# Patient Record
Sex: MALE | Race: BLACK OR AFRICAN AMERICAN | NOT HISPANIC OR LATINO | Employment: OTHER | URBAN - METROPOLITAN AREA
[De-identification: names, ages, dates, MRNs, and addresses within clinical notes are randomized per-mention and may not be internally consistent; named-entity substitution may affect disease eponyms.]

---

## 2016-11-23 LAB
LEFT EYE DM RETINOPATHY: NEGATIVE
RIGHT EYE DM RETINOPATHY: NEGATIVE

## 2017-10-23 NOTE — PROGRESS NOTES
This note was created by combination of typed  and Dragon dictation.  Transcription errors may be present.  If there are any questions, please contact me.    Assessment & Plan  Chronic midline low back pain without sciatica  Chronic narcotic use  -Reportedly had workup including imaging, sounds like self directed physical therapy, never any surgical interventions indicated.  He's been out of his medications for the past month.   aware queried.  Refilled hydrocodone.  Old records.  She has not had alternative medications or interventions, may need to exhaust those and we talked about this.  Talked about high risk nature of narcotics.  Will need pain contract at future OV  -     hydrocodone-acetaminophen 10-325mg (NORCO)  mg Tab; Take 1 tablet by mouth every 24 hours as needed.  Dispense: 30 tablet; Refill: 0    Essential hypertension - not controlled today, he feels like this is because of his uncontrolled back pain.  For now no change, has room to increase on valsartan and propranolol.    Controlled type 2 diabetes mellitus without complication, without long-term current use of insulin-on metformin, reports that lab work was done less than one month ago, old records request    Coronary artery disease involving native coronary artery of native heart without angina pectoris-on ARB, on beta blocker, not on statin.  Unsure why.  Old records request.    Benign prostatic hyperplasia without lower urinary tract symptoms    Smoker-recommended he stop completely.  He feels like he can do so without assistance.    Medications Discontinued During This Encounter   Medication Reason    hydrocodone-acetaminophen 10-325mg (NORCO)  mg Tab Reorder       Follow-up: No Follow-up on file. office visit 1 month, old records requested in the interim      =================================================================      Chief Complaint   Patient presents with    [facility]    Back Pain       HPI  Paulino  is a 61 y.o. male, last appointment with this clinic was Visit date not found.    NP  He was seeing Dr Fermin Deng who apparently got  and is planning to move his office.  Sounds like he had another doctor who is going to take over his practice but the patient decided to switch offices.    Chronic back pain reportedly after a fall and aggravated with chronic use.  Has been out of medication; affected his sleep. Never surgery; recalls imaging in the past. Does recall having seen a specialist - near [place].  XR apparently showed arthritis. Never injections. Self directed PT. Chronic narcotic therapy.  Dr. Fermin Deng.  Hydrocodone 10 mg daily #30 last RF 8/2. Does water aerobics at the [facility].  Treadmill too.  Notes that his pain is uncontrolled today and he thinks that is why his blood pressure is high.  In the lower back.    Reports colonoscopy done at [facility] repeat 10 years. < 10 years ago by his recall - about 2 years.    Smoker, 1-2 cigarettes a day this time x 4 years.  Feels he can quit without assistance.    DM; metformin - not daily glc checks. On metformin.    Not on statin.    [facility]     Patient Care Team:  Rogelio March MD as PCP - General (Internal Medicine)    Patient Active Problem List    Diagnosis Date Noted    Chronic midline low back pain without sciatica 10/24/2017    Chronic narcotic use 10/24/2017    Essential hypertension 10/24/2017    Diabetes type 2, controlled 10/24/2017    Coronary artery disease involving native coronary artery of native heart without angina pectoris 10/24/2017    Benign prostatic hyperplasia without lower urinary tract symptoms 10/24/2017    Smoker 10/24/2017       PAST MEDICAL HISTORY:  Past Medical History:   Diagnosis Date    Diabetes mellitus, type 2     Hypertension        PAST SURGICAL HISTORY:  History reviewed. No pertinent surgical history.    Family History   Problem Relation Age of Onset     Coronary artery disease Mother     Diabetes Father     Valvular heart disease Sister     Lung disease Sister     Diabetes Brother     No Known Problems Daughter     No Known Problems Son     Diabetes Brother        SOCIAL HISTORY:  Social History     Social History    Marital status: Single     Spouse name: N/A    Number of children: N/A    Years of education: N/A     Occupational History    disabled - orthopedic      Social History Main Topics    Smoking status: Current Every Day Smoker     Packs/day: 0.10     Types: Cigarettes    Smokeless tobacco: Never Used    Alcohol use Not on file    Drug use: No    Sexual activity: Yes     Other Topics Concern    Not on file     Social History Narrative    No narrative on file       ALLERGIES AND MEDICATIONS: updated and reviewed.  Review of patient's allergies indicates:  Allergies not on file  Current Outpatient Prescriptions   Medication Sig Dispense Refill    clopidogrel (PLAVIX) 75 mg tablet Take 75 mg by mouth once daily.      finasteride (PROSCAR) 5 mg tablet Take 5 mg by mouth once daily.      hydrocodone-acetaminophen 10-325mg (NORCO)  mg Tab Take 1 tablet by mouth every 24 hours as needed.       metFORMIN (GLUCOPHAGE) 500 MG tablet Take 500 mg by mouth 2 (two) times daily with meals.      omeprazole (PRILOSEC) 20 MG capsule Take 20 mg by mouth once daily.      propranolol (INDERAL) 20 MG tablet Take 20 mg by mouth 3 (three) times daily.      valsartan (DIOVAN) 160 MG tablet Take 160 mg by mouth once daily.       No current facility-administered medications for this visit.        Review of Systems   Constitutional: Negative for chills and fever.   Respiratory: Negative for shortness of breath.    Cardiovascular: Negative for chest pain and palpitations.   Musculoskeletal: Positive for back pain.       Physical Exam   Vitals:    10/24/17 0959   BP: (!) 160/100   Pulse: 88   Temp: 98.3 °F (36.8 °C)   Weight: 92.5 kg (203 lb 14.8 oz)  "  Height: 6' 2" (1.88 m)    Body mass index is 26.18 kg/m².  Weight: 92.5 kg (203 lb 14.8 oz)   Height: 6' 2" (188 cm)     Physical Exam   Constitutional: He is oriented to person, place, and time. He appears well-developed and well-nourished. No distress.   Eyes: EOM are normal.   Cardiovascular: Normal rate, regular rhythm and normal heart sounds.    No murmur heard.  Pulmonary/Chest: Effort normal and breath sounds normal.   Musculoskeletal: Normal range of motion.   He is mildly diffusely tender on palpation over the thoracic back on inferiorly.  Most tender in the lower lumbar back, straight leg raise on the right elicits pain   Neurological: He is alert and oriented to person, place, and time. Coordination normal.   Skin: Skin is warm and dry.   Psychiatric: He has a normal mood and affect. His behavior is normal. Thought content normal.     "

## 2017-10-24 ENCOUNTER — OFFICE VISIT (OUTPATIENT)
Dept: FAMILY MEDICINE | Facility: CLINIC | Age: 62
End: 2017-10-24
Payer: MEDICARE

## 2017-10-24 VITALS
DIASTOLIC BLOOD PRESSURE: 100 MMHG | SYSTOLIC BLOOD PRESSURE: 160 MMHG | TEMPERATURE: 98 F | HEIGHT: 74 IN | WEIGHT: 203.94 LBS | BODY MASS INDEX: 26.17 KG/M2 | HEART RATE: 88 BPM

## 2017-10-24 DIAGNOSIS — M54.50 CHRONIC MIDLINE LOW BACK PAIN WITHOUT SCIATICA: Primary | ICD-10-CM

## 2017-10-24 DIAGNOSIS — G89.29 CHRONIC MIDLINE LOW BACK PAIN WITHOUT SCIATICA: Primary | ICD-10-CM

## 2017-10-24 DIAGNOSIS — N40.0 BENIGN PROSTATIC HYPERPLASIA WITHOUT LOWER URINARY TRACT SYMPTOMS: ICD-10-CM

## 2017-10-24 DIAGNOSIS — F17.200 SMOKER: ICD-10-CM

## 2017-10-24 DIAGNOSIS — I10 ESSENTIAL HYPERTENSION: ICD-10-CM

## 2017-10-24 DIAGNOSIS — F11.90 CHRONIC NARCOTIC USE: ICD-10-CM

## 2017-10-24 DIAGNOSIS — E11.9 CONTROLLED TYPE 2 DIABETES MELLITUS WITHOUT COMPLICATION, WITHOUT LONG-TERM CURRENT USE OF INSULIN: ICD-10-CM

## 2017-10-24 DIAGNOSIS — I25.10 CORONARY ARTERY DISEASE INVOLVING NATIVE CORONARY ARTERY OF NATIVE HEART WITHOUT ANGINA PECTORIS: ICD-10-CM

## 2017-10-24 PROCEDURE — 99203 OFFICE O/P NEW LOW 30 MIN: CPT | Mod: PBBFAC,PO | Performed by: INTERNAL MEDICINE

## 2017-10-24 PROCEDURE — 99204 OFFICE O/P NEW MOD 45 MIN: CPT | Mod: S$PBB,,, | Performed by: INTERNAL MEDICINE

## 2017-10-24 PROCEDURE — 99999 PR PBB SHADOW E&M-NEW PATIENT-LVL III: CPT | Mod: PBBFAC,,, | Performed by: INTERNAL MEDICINE

## 2017-10-24 RX ORDER — OMEPRAZOLE 20 MG/1
40 CAPSULE, DELAYED RELEASE ORAL DAILY
COMMUNITY
End: 2023-02-02 | Stop reason: SDUPTHER

## 2017-10-24 RX ORDER — HYDROCODONE BITARTRATE AND ACETAMINOPHEN 10; 325 MG/1; MG/1
1 TABLET ORAL
Qty: 30 TABLET | Refills: 0 | Status: SHIPPED | OUTPATIENT
Start: 2017-10-24 | End: 2017-11-20 | Stop reason: SDUPTHER

## 2017-10-24 RX ORDER — CLOPIDOGREL BISULFATE 75 MG/1
75 TABLET ORAL DAILY
Status: ON HOLD | COMMUNITY
End: 2022-11-09 | Stop reason: HOSPADM

## 2017-10-24 RX ORDER — PROPRANOLOL HYDROCHLORIDE 20 MG/1
20 TABLET ORAL 3 TIMES DAILY
COMMUNITY
End: 2017-11-24 | Stop reason: ALTCHOICE

## 2017-10-24 RX ORDER — FINASTERIDE 5 MG/1
5 TABLET, FILM COATED ORAL DAILY
COMMUNITY
End: 2021-01-22 | Stop reason: ALTCHOICE

## 2017-10-24 RX ORDER — METFORMIN HYDROCHLORIDE 500 MG/1
500 TABLET ORAL 2 TIMES DAILY WITH MEALS
COMMUNITY
End: 2019-05-16 | Stop reason: SDUPTHER

## 2017-10-24 RX ORDER — VALSARTAN 160 MG/1
160 TABLET ORAL DAILY
COMMUNITY
End: 2017-11-24

## 2017-10-24 RX ORDER — HYDROCODONE BITARTRATE AND ACETAMINOPHEN 10; 325 MG/1; MG/1
1 TABLET ORAL
COMMUNITY
End: 2017-10-24 | Stop reason: SDUPTHER

## 2017-10-27 DIAGNOSIS — Z12.11 COLON CANCER SCREENING: ICD-10-CM

## 2017-11-20 DIAGNOSIS — G89.29 CHRONIC MIDLINE LOW BACK PAIN WITHOUT SCIATICA: ICD-10-CM

## 2017-11-20 DIAGNOSIS — F11.90 CHRONIC NARCOTIC USE: ICD-10-CM

## 2017-11-20 DIAGNOSIS — M54.50 CHRONIC MIDLINE LOW BACK PAIN WITHOUT SCIATICA: ICD-10-CM

## 2017-11-20 RX ORDER — HYDROCODONE BITARTRATE AND ACETAMINOPHEN 10; 325 MG/1; MG/1
1 TABLET ORAL
Qty: 30 TABLET | Refills: 0 | Status: SHIPPED | OUTPATIENT
Start: 2017-11-20 | End: 2017-12-15 | Stop reason: SDUPTHER

## 2017-11-20 NOTE — TELEPHONE ENCOUNTER
----- Message from Lara Lee sent at 11/20/2017  8:25 AM CST -----  Refill: hydrocodone-acetaminophen 10-325mg (NORCO)  mg Tab    [id number] [facility] [place] [facility] [address]    Patient states he is going out of town. Please call patient at [phone] Thank you!

## 2017-11-22 NOTE — PROGRESS NOTES
This note was created by combination of typed  and Dragon dictation.  Transcription errors may be present.  If there are any questions, please contact me.    Assessment & Plan  Essential hypertension-I'm not sure that these are the exact medications that he takes.  Our staff called over to write a and he has not filled anything other than Norco with them.  Our staff to try and confirm medications and dosages.  He recalls taking a half tablet of a medication, I presume this is a statin but he cannot recall the name of it.  He thinks he is taking valsartan 160 and I'm going to increase it to 320.  Stop the propranolol and start carvedilol at 12.5 to see if we can have additional blood pressure lowering.  Does not recall history of HCTZ.  I await old records but may need to start it if he is never tried it before.  [facility].  I did warn him about seeing so many different doctors as there was a heightened risk of polypharmacy.  -     valsartan (DIOVAN) 320 MG tablet; Take 1 tablet (320 mg total) by mouth once daily.  Dispense: 90 tablet; Refill: 3  -     carvedilol (COREG) 12.5 MG tablet; Take 1 tablet (12.5 mg total) by mouth 2 (two) times daily with meals.  Dispense: 60 tablet; Refill: 11    Chronic narcotic use-Old records from Dr. Lee's office requested.  Aware - he was filling this regularly.    Coronary artery disease involving native coronary artery of native heart without angina pectoris - sounds like he is taking 1/2 tablet statin?  Old records.    Controlled type 2 diabetes mellitus without complication, without long-term current use of insulin - old records requested by never received.  Check labs today CMP and A1c.  On metformin.  -     Comprehensive metabolic panel; Future; Expected date: 11/24/2017  -     Hemoglobin A1c; Future; Expected date: 11/24/2017    Reports he had flu shot done at [facility] a few weeks ago.  After pt had left we  "queried [facility] - had it done August of 2016 - over 1 year ago - needs flu shot with follow up    Medications Discontinued During This Encounter   Medication Reason    valsartan (DIOVAN) 160 MG tablet Therapy not effective    propranolol (INDERAL) 20 MG tablet Therapy completed       Follow-up: No Follow-up on file. OV 1 month follow up BP.  Re-requested old records.  VANESSA signed again.      =================================================================      Chief Complaint   Patient presents with    Follow-up       HPI  Paulino is a 61 y.o. male, last appointment with this clinic was 10/24/2017.    DM; metformin  Chronic back pain reportedly after a fall and aggravated with chronic use.  Never surgery; recalls imaging in the past. Does recall having seen a specialist - near [facility].  XR apparently showed arthritis. Never injections. Self directed PT. Chronic narcotic therapy.    Reports colonoscopy done at [facility] repeat 10 years. < 10 years ago by his recall - about 2 years.  Smoker, 1-2 cigarettes a day this time x 4 years.  Feels he can quit without assistance.    Reports he is taking a "cholesterol medicine" 1/2 tablet but cannot recall the name.      Last visit- new pt - plan was old records - not received. He now shares with me that [facility].  Sounds like he goes through a resident clinic.  He is unable to tell me names of medicines, just knows them by the color.  Discussed with him the importance of knowing names, as generics have variable colors.    No labs available for DM    Reports UTD [facility] shot at [facility].  However, after pt gone, queried [facility] - was done last year.    No new complaints.  Still smoking.     BP high today. No symptoms.     Patient Care Team:  Rogelio March MD as PCP - General (Internal Medicine)    Patient Active Problem List    Diagnosis Date Noted    Chronic midline low back pain without sciatica 10/24/2017    " Chronic narcotic use 10/24/2017    Essential hypertension 10/24/2017    Diabetes type 2, controlled 10/24/2017    Coronary artery disease involving native coronary artery of native heart without angina pectoris 10/24/2017    Benign prostatic hyperplasia without lower urinary tract symptoms 10/24/2017    Smoker 10/24/2017       PAST MEDICAL HISTORY:  Past Medical History:   Diagnosis Date    Diabetes mellitus, type 2     Hypertension        PAST SURGICAL HISTORY:  History reviewed. No pertinent surgical history.    SOCIAL HISTORY:  Social History     Social History    Marital status: Single     Spouse name: N/A    Number of children: N/A    Years of education: N/A     Occupational History    disabled - orthopedic      Social History Main Topics    Smoking status: Current Every Day Smoker     Packs/day: 0.10     Types: Cigarettes    Smokeless tobacco: Never Used    Alcohol use Not on file    Drug use: No    Sexual activity: Yes     Other Topics Concern    Not on file     Social History Narrative    No narrative on file       ALLERGIES AND MEDICATIONS: updated and reviewed.  Review of patient's allergies indicates:  No Known Allergies  Current Outpatient Prescriptions   Medication Sig Dispense Refill    clopidogrel (PLAVIX) 75 mg tablet Take 75 mg by mouth once daily.      finasteride (PROSCAR) 5 mg tablet Take 5 mg by mouth once daily.      hydrocodone-acetaminophen 10-325mg (NORCO)  mg Tab Take 1 tablet by mouth every 24 hours as needed. 30 tablet 0    metFORMIN (GLUCOPHAGE) 500 MG tablet Take 500 mg by mouth 2 (two) times daily with meals.      omeprazole (PRILOSEC) 20 MG capsule Take 20 mg by mouth once daily.      propranolol (INDERAL) 20 MG tablet Take 20 mg by mouth 3 (three) times daily.      valsartan (DIOVAN) 160 MG tablet Take 160 mg by mouth once daily.       No current facility-administered medications for this visit.        Review of Systems   All other systems reviewed and  "are negative.      Physical Exam   Vitals:    11/24/17 1135   BP: (!) 165/101   Pulse: 81   Temp: 97.2 °F (36.2 °C)   SpO2: 95%   Weight: 91.3 kg (201 lb 6.2 oz)   Height: 6' 2" (1.88 m)    Body mass index is 25.86 kg/m².  Weight: 91.3 kg (201 lb 6.2 oz)   Height: 6' 2" (188 cm)     Physical Exam   Constitutional: He is oriented to person, place, and time. He appears well-developed and well-nourished. No distress.   Eyes: EOM are normal.   Cardiovascular: Normal rate, regular rhythm and normal heart sounds.    No murmur heard.  Pulmonary/Chest: Effort normal and breath sounds normal.   Musculoskeletal: Normal range of motion.   Neurological: He is alert and oriented to person, place, and time. Coordination normal.   Skin: Skin is warm and dry.   Psychiatric: He has a normal mood and affect. His behavior is normal. Thought content normal.     "

## 2017-11-24 ENCOUNTER — OFFICE VISIT (OUTPATIENT)
Dept: FAMILY MEDICINE | Facility: CLINIC | Age: 62
End: 2017-11-24
Payer: MEDICARE

## 2017-11-24 ENCOUNTER — TELEPHONE (OUTPATIENT)
Dept: FAMILY MEDICINE | Facility: CLINIC | Age: 62
End: 2017-11-24

## 2017-11-24 ENCOUNTER — LAB VISIT (OUTPATIENT)
Dept: LAB | Facility: HOSPITAL | Age: 62
End: 2017-11-24
Attending: INTERNAL MEDICINE
Payer: MEDICARE

## 2017-11-24 VITALS
WEIGHT: 201.38 LBS | SYSTOLIC BLOOD PRESSURE: 165 MMHG | DIASTOLIC BLOOD PRESSURE: 101 MMHG | TEMPERATURE: 97 F | HEART RATE: 81 BPM | BODY MASS INDEX: 25.84 KG/M2 | OXYGEN SATURATION: 95 % | HEIGHT: 74 IN

## 2017-11-24 DIAGNOSIS — E11.9 CONTROLLED TYPE 2 DIABETES MELLITUS WITHOUT COMPLICATION, WITHOUT LONG-TERM CURRENT USE OF INSULIN: ICD-10-CM

## 2017-11-24 DIAGNOSIS — M1A.09X0 IDIOPATHIC CHRONIC GOUT OF MULTIPLE SITES WITHOUT TOPHUS: ICD-10-CM

## 2017-11-24 DIAGNOSIS — F11.90 CHRONIC NARCOTIC USE: ICD-10-CM

## 2017-11-24 DIAGNOSIS — I25.10 CORONARY ARTERY DISEASE INVOLVING NATIVE CORONARY ARTERY OF NATIVE HEART WITHOUT ANGINA PECTORIS: ICD-10-CM

## 2017-11-24 DIAGNOSIS — I10 ESSENTIAL HYPERTENSION: Primary | ICD-10-CM

## 2017-11-24 PROBLEM — M10.09 IDIOPATHIC GOUT OF MULTIPLE SITES: Status: ACTIVE | Noted: 2017-11-24

## 2017-11-24 LAB
ALBUMIN SERPL BCP-MCNC: 3.6 G/DL
ALP SERPL-CCNC: 136 U/L
ALT SERPL W/O P-5'-P-CCNC: 15 U/L
ANION GAP SERPL CALC-SCNC: 8 MMOL/L
AST SERPL-CCNC: 19 U/L
BILIRUB SERPL-MCNC: 0.2 MG/DL
BUN SERPL-MCNC: 11 MG/DL
CALCIUM SERPL-MCNC: 10 MG/DL
CHLORIDE SERPL-SCNC: 108 MMOL/L
CO2 SERPL-SCNC: 27 MMOL/L
CREAT SERPL-MCNC: 1.1 MG/DL
EST. GFR  (AFRICAN AMERICAN): >60 ML/MIN/1.73 M^2
EST. GFR  (NON AFRICAN AMERICAN): >60 ML/MIN/1.73 M^2
ESTIMATED AVG GLUCOSE: 160 MG/DL
GLUCOSE SERPL-MCNC: 106 MG/DL
HBA1C MFR BLD HPLC: 7.2 %
POTASSIUM SERPL-SCNC: 4.4 MMOL/L
PROT SERPL-MCNC: 7.6 G/DL
SODIUM SERPL-SCNC: 143 MMOL/L

## 2017-11-24 PROCEDURE — 80053 COMPREHEN METABOLIC PANEL: CPT

## 2017-11-24 PROCEDURE — 99213 OFFICE O/P EST LOW 20 MIN: CPT | Mod: PBBFAC,PO | Performed by: INTERNAL MEDICINE

## 2017-11-24 PROCEDURE — 99214 OFFICE O/P EST MOD 30 MIN: CPT | Mod: S$PBB,,, | Performed by: INTERNAL MEDICINE

## 2017-11-24 PROCEDURE — 36415 COLL VENOUS BLD VENIPUNCTURE: CPT | Mod: PO

## 2017-11-24 PROCEDURE — 99999 PR PBB SHADOW E&M-EST. PATIENT-LVL III: CPT | Mod: PBBFAC,,, | Performed by: INTERNAL MEDICINE

## 2017-11-24 PROCEDURE — 83036 HEMOGLOBIN GLYCOSYLATED A1C: CPT

## 2017-11-24 RX ORDER — SIMVASTATIN 40 MG/1
20 TABLET, FILM COATED ORAL NIGHTLY
Qty: 90 TABLET | Refills: 3
Start: 2017-11-24 | End: 2019-01-02 | Stop reason: SDUPTHER

## 2017-11-24 RX ORDER — CARVEDILOL 12.5 MG/1
12.5 TABLET ORAL 2 TIMES DAILY WITH MEALS
Qty: 60 TABLET | Refills: 11 | Status: SHIPPED | OUTPATIENT
Start: 2017-11-24 | End: 2018-07-16 | Stop reason: SDUPTHER

## 2017-11-24 RX ORDER — VALSARTAN 320 MG/1
320 TABLET ORAL DAILY
Qty: 90 TABLET | Refills: 3 | Status: SHIPPED | OUTPATIENT
Start: 2017-11-24 | End: 2018-08-20 | Stop reason: SINTOL

## 2017-11-24 RX ORDER — GLIPIZIDE 5 MG/1
5 TABLET ORAL
Qty: 60 TABLET | Refills: 11
Start: 2017-11-24 | End: 2019-05-15 | Stop reason: SDUPTHER

## 2017-11-24 RX ORDER — ALLOPURINOL 300 MG/1
300 TABLET ORAL DAILY
Qty: 90 TABLET | Refills: 0
Start: 2017-11-24 | End: 2019-01-06

## 2017-11-24 NOTE — PATIENT INSTRUCTIONS
STOP THE PROPRANOLOL.  I AM STARTING YOU ON CARVEDILOL INSTEAD - FOR HEART AND BLOOD PRESSURE.     INCREASE THE VALSARTAN (DIOVAN) - TAKE 320 MG OF THIS.

## 2017-11-24 NOTE — TELEPHONE ENCOUNTER
----- Message from Jess Sofia sent at 11/24/2017  1:13 PM CST -----  Contact: [phone]  Pt said the name of the medication he is taking is valsartan (DIOVAN) 160 MG tablet,simvastain 40mg ,clopidogrel (PLAVIX) 75 mg tablet,allopurlnol 300mg ,propranolol (INDERAL) 20 MG tablet ,glypidze 5 mg ,finasteride (PROSCAR) 5 mg tablet,omeprazole (PRILOSEC) 20 MG capsule,

## 2017-11-29 NOTE — PROGRESS NOTES
a1c not quite ideal at 7.2.  Had been getting care with another outside physician and also periodically goes [facility].   CMP WNL.  Attempting to get old records from Dr. Canas's office.

## 2017-12-01 DIAGNOSIS — E11.9 TYPE 2 DIABETES MELLITUS WITHOUT COMPLICATION: ICD-10-CM

## 2017-12-15 DIAGNOSIS — F11.90 CHRONIC NARCOTIC USE: ICD-10-CM

## 2017-12-15 DIAGNOSIS — G89.29 CHRONIC MIDLINE LOW BACK PAIN WITHOUT SCIATICA: ICD-10-CM

## 2017-12-15 DIAGNOSIS — M54.50 CHRONIC MIDLINE LOW BACK PAIN WITHOUT SCIATICA: ICD-10-CM

## 2017-12-15 RX ORDER — HYDROCODONE BITARTRATE AND ACETAMINOPHEN 10; 325 MG/1; MG/1
1 TABLET ORAL
Qty: 30 TABLET | Refills: 0 | Status: SHIPPED | OUTPATIENT
Start: 2017-12-15 | End: 2018-01-16 | Stop reason: SDUPTHER

## 2017-12-15 NOTE — TELEPHONE ENCOUNTER
----- Message from Harini Brown sent at 12/15/2017  3:07 PM CST -----  Contact: self  [phone]  Pt is requesting a refill on hydrocodone . Pt said it needs to be filled on 12-19-17. Pls call [facility] [phone]. Thanks......Teresa

## 2018-01-16 DIAGNOSIS — G89.29 CHRONIC MIDLINE LOW BACK PAIN WITHOUT SCIATICA: ICD-10-CM

## 2018-01-16 DIAGNOSIS — M54.50 CHRONIC MIDLINE LOW BACK PAIN WITHOUT SCIATICA: ICD-10-CM

## 2018-01-16 DIAGNOSIS — F11.90 CHRONIC NARCOTIC USE: ICD-10-CM

## 2018-01-16 RX ORDER — HYDROCODONE BITARTRATE AND ACETAMINOPHEN 10; 325 MG/1; MG/1
TABLET ORAL
Qty: 30 TABLET | Refills: 0 | Status: SHIPPED | OUTPATIENT
Start: 2018-01-16 | End: 2018-02-17 | Stop reason: SDUPTHER

## 2018-02-17 DIAGNOSIS — F11.90 CHRONIC NARCOTIC USE: ICD-10-CM

## 2018-02-17 DIAGNOSIS — M54.50 CHRONIC MIDLINE LOW BACK PAIN WITHOUT SCIATICA: ICD-10-CM

## 2018-02-17 DIAGNOSIS — G89.29 CHRONIC MIDLINE LOW BACK PAIN WITHOUT SCIATICA: ICD-10-CM

## 2018-02-19 RX ORDER — HYDROCODONE BITARTRATE AND ACETAMINOPHEN 10; 325 MG/1; MG/1
TABLET ORAL
Qty: 30 TABLET | Refills: 0 | Status: SHIPPED | OUTPATIENT
Start: 2018-02-19 | End: 2018-03-16 | Stop reason: SDUPTHER

## 2018-03-16 ENCOUNTER — TELEPHONE (OUTPATIENT)
Dept: FAMILY MEDICINE | Facility: CLINIC | Age: 63
End: 2018-03-16

## 2018-03-16 DIAGNOSIS — M54.50 CHRONIC MIDLINE LOW BACK PAIN WITHOUT SCIATICA: ICD-10-CM

## 2018-03-16 DIAGNOSIS — G89.29 CHRONIC MIDLINE LOW BACK PAIN WITHOUT SCIATICA: ICD-10-CM

## 2018-03-16 DIAGNOSIS — F11.90 CHRONIC NARCOTIC USE: ICD-10-CM

## 2018-03-16 RX ORDER — HYDROCODONE BITARTRATE AND ACETAMINOPHEN 10; 325 MG/1; MG/1
TABLET ORAL
Qty: 30 TABLET | Refills: 0 | Status: SHIPPED | OUTPATIENT
Start: 2018-03-16 | End: 2018-04-23 | Stop reason: SDUPTHER

## 2018-03-16 NOTE — TELEPHONE ENCOUNTER
rx sent.  I have been trying to get old records from Dr. Lee. Have not rec'd these.  Can we call his office and see if they have his records? [phone]

## 2018-04-13 DIAGNOSIS — E11.9 DIABETES MELLITUS WITHOUT COMPLICATION: ICD-10-CM

## 2018-04-17 DIAGNOSIS — G89.29 CHRONIC MIDLINE LOW BACK PAIN WITHOUT SCIATICA: ICD-10-CM

## 2018-04-17 DIAGNOSIS — M54.50 CHRONIC MIDLINE LOW BACK PAIN WITHOUT SCIATICA: ICD-10-CM

## 2018-04-17 DIAGNOSIS — F11.90 CHRONIC NARCOTIC USE: ICD-10-CM

## 2018-04-17 RX ORDER — HYDROCODONE BITARTRATE AND ACETAMINOPHEN 10; 325 MG/1; MG/1
TABLET ORAL
Qty: 30 TABLET | Refills: 0 | OUTPATIENT
Start: 2018-04-17

## 2018-04-18 NOTE — PROGRESS NOTES
This note was created by combination of typed  and Dragon dictation.  Transcription errors may be present.  If there are any questions, please contact me.    Assessment & Plan:   Chronic midline low back pain without sciatica  Chronic narcotic use  -out of rx's x 2-3 days. Refilled today with post dated rx's.  Old records request (again) for old MRIs, any previous treatment. He is wary of injections.  Hx of NSAIDS.  No known hx of dominique or TCA, may consider that in the future as adjuncts and we talked about this.    Signed pain contract today and salient points discussed.  -     hydrocodone-acetaminophen 10-325mg (NORCO)  mg Tab; take 1 tablet by mouth every 24 hours if needed for pain  Dispense: 30 tablet; Refill: 0  -     hydrocodone-acetaminophen 10-325mg (NORCO)  mg Tab; take 1 tablet by mouth every 24 hours if needed for pain  Dispense: 30 tablet; Refill: 0  -     hydrocodone-acetaminophen 10-325mg (NORCO)  mg Tab; take 1 tablet by mouth every 24 hours if needed for pain  Dispense: 30 tablet; Refill: 0    Need for hepatitis C screening test  -     Hepatitis C antibody; Future; Expected date: 04/23/2018    Essential hypertension - not to goal, add on amlodipine, stay on the carvedilol and valsartan  -     Comprehensive metabolic panel; Future; Expected date: 04/23/2018  -     amLODIPine (NORVASC) 2.5 MG tablet; Take 1 tablet (2.5 mg total) by mouth once daily.  Dispense: 30 tablet; Refill: 11    Coronary artery disease involving native coronary artery of native heart without angina pectoris - check FLP, on beeta blocker, ARB, plavix, and simvastatin.  -     Lipid panel; Future; Expected date: 04/23/2018    Controlled type 2 diabetes mellitus without complication, without long-term current use of insulin - check A1c.  Microalbumin.  [facility]. Not on ASA (is on plavix); on metformin and glipizide, on statin. Foot exam done today.  -     Hemoglobin  A1c; Future; Expected date: 04/23/2018  -     Microalbumin/creatinine urine ratio; Future; Expected date: 04/23/2018    Idiopathic chronic gout of multiple sites without tophus - check uric acid.  On allopurinol.  -     Uric acid; Future; Expected date: 04/23/2018    Smoker - has cut down to 1/4 PPD.    Medications Discontinued During This Encounter   Medication Reason    hydrocodone-acetaminophen 10-325mg (NORCO)  mg Tab Reorder     Modified Medications    Modified Medication Previous Medication    HYDROCODONE-ACETAMINOPHEN 10-325MG (NORCO)  MG TAB hydrocodone-acetaminophen 10-325mg (NORCO)  mg Tab       take 1 tablet by mouth every 24 hours if needed for pain    take 1 tablet by mouth every 24 hours if needed for pain     New Prescriptions    AMLODIPINE (NORVASC) 2.5 MG TABLET    Take 1 tablet (2.5 mg total) by mouth once daily.    HYDROCODONE-ACETAMINOPHEN 10-325MG (NORCO)  MG TAB    take 1 tablet by mouth every 24 hours if needed for pain    HYDROCODONE-ACETAMINOPHEN 10-325MG (NORCO)  MG TAB    take 1 tablet by mouth every 24 hours if needed for pain       Follow Up: No Follow-up on file. Plan for follow up 3 months.        Subjective:     Chief Complaint   Patient presents with    Hypertension    Back Pain       HPI  Paulino is a 62 y.o. male, last appointment with this clinic was 11/24/2017.    DM; metformin, glipizide  Chronic back pain reportedly after a fall and aggravated with chronic use.  Never surgery; recalls imaging in the past. Does recall having seen a specialist - near [facility].  XR apparently showed arthritis. Never injections. Self directed PT. Chronic narcotic therapy.    Reports colonoscopy done at [facility] repeat 10 years. < 10 years ago by his recall - about 2 years.  Smoker, 1-2 cigarettes a day this time x 4 years.  Feels he can quit without assistance.  BPH, finasteride  GERD, omeprazole  Gout, podagra and ankle, allopurinol  Plavix, unclear indication.     Last  seen 11/2017  Last visit - increased diovan 160 to 320; changed propranolol to carvedilol; consider HCTZ - no known history of this  Requested old records from Dr. Lee's office  Was on simvastatin 40;    Has been out of the pain meds x a few days.  Estimates pain is 5/10 with meds and 9-10/10 without the meds.  Pain is in the lower back.  In the center of the lumbar spine. Is nonradiating.  Started about 5 years ago by his estimate. Recalls having an MRI done in the past - will need to get old records (previously requested).  Does activity - treadmill, water aerobics.   Does not recall hx of gabapentin.  We talked about adjuncts to narcotic pain medication with a goal of improving pain but also with an eye to either reducing narcotic dose or avoiding escalating.    BP high on intake.  No outside BP readings.  Reports compliance with meds.  Thinks his BP is elevated b/c of pain.    Fasting.    Remote gout glare.  Typically in the feet if he were to get it.    Smoker, cutting back he reports.  < 1/4 PPD.     [facility]  [facility].    Patient Care Team:  Rogelio March MD as PCP - General (Internal Medicine)  Fermin Lee Jr., MD as Consulting Physician (Family Medicine)    Patient Active Problem List    Diagnosis Date Noted    Idiopathic gout of multiple sites 11/24/2017    Chronic midline low back pain without sciatica 10/24/2017    Chronic narcotic use 10/24/2017    Essential hypertension 10/24/2017    Diabetes type 2, controlled 10/24/2017    Coronary artery disease involving native coronary artery of native heart without angina pectoris 10/24/2017    Benign prostatic hyperplasia without lower urinary tract symptoms 10/24/2017    Smoker 10/24/2017       PAST MEDICAL HISTORY:  Past Medical History:   Diagnosis Date    Diabetes mellitus, type 2     Hypertension        PAST SURGICAL HISTORY:  History reviewed. No pertinent surgical history.    SOCIAL HISTORY:  Social  History     Social History    Marital status: Single     Spouse name: N/A    Number of children: N/A    Years of education: N/A     Occupational History    disabled - orthopedic      Social History Main Topics    Smoking status: Current Every Day Smoker     Packs/day: 0.10     Types: Cigarettes    Smokeless tobacco: Never Used    Alcohol use Not on file    Drug use: No    Sexual activity: Yes     Other Topics Concern    Not on file     Social History Narrative    No narrative on file       ALLERGIES AND MEDICATIONS: updated and reviewed.  Review of patient's allergies indicates:  No Known Allergies  Current Outpatient Prescriptions   Medication Sig Dispense Refill    allopurinol (ZYLOPRIM) 300 MG tablet Take 1 tablet (300 mg total) by mouth once daily. 90 tablet 0    carvedilol (COREG) 12.5 MG tablet Take 1 tablet (12.5 mg total) by mouth 2 (two) times daily with meals. 60 tablet 11    clopidogrel (PLAVIX) 75 mg tablet Take 75 mg by mouth once daily.      finasteride (PROSCAR) 5 mg tablet Take 5 mg by mouth once daily.      glipiZIDE (GLUCOTROL) 5 MG tablet Take 1 tablet (5 mg total) by mouth 2 (two) times daily before meals. 60 tablet 11    hydrocodone-acetaminophen 10-325mg (NORCO)  mg Tab take 1 tablet by mouth every 24 hours if needed for pain 30 tablet 0    metFORMIN (GLUCOPHAGE) 500 MG tablet Take 500 mg by mouth 2 (two) times daily with meals.      omeprazole (PRILOSEC) 20 MG capsule Take 20 mg by mouth once daily.      simvastatin (ZOCOR) 40 MG tablet Take 0.5 tablets (20 mg total) by mouth every evening. 90 tablet 3    valsartan (DIOVAN) 320 MG tablet Take 1 tablet (320 mg total) by mouth once daily. 90 tablet 3     No current facility-administered medications for this visit.        Review of Systems   Constitutional: Negative for chills and fever.   Respiratory: Negative for shortness of breath.    Cardiovascular: Negative for chest pain.       Objective:   Physical Exam  "  Vitals:    04/23/18 0917 04/23/18 0940   BP: (!) 156/86 (!) 140/80   BP Location:  Left arm   Patient Position:  Sitting   BP Method:  Medium (Manual)   Pulse: 76    Temp: 98.2 °F (36.8 °C)    TempSrc: Oral    SpO2: 97%    Weight: 88 kg (194 lb 0.1 oz)    Height: 6' 2" (1.88 m)     Body mass index is 24.91 kg/m².  Weight: 88 kg (194 lb 0.1 oz)   Height: 6' 2" (188 cm)     Physical Exam   Constitutional: He is oriented to person, place, and time. He appears well-developed and well-nourished. No distress.   Eyes: EOM are normal.   Cardiovascular: Normal rate, regular rhythm and normal heart sounds.    No murmur heard.  Pulses:       Dorsalis pedis pulses are 3+ on the right side, and 3+ on the left side.        Posterior tibial pulses are 3+ on the right side, and 3+ on the left side.   Pulmonary/Chest: Effort normal and breath sounds normal.   Musculoskeletal: Normal range of motion. He exhibits no edema.        Right foot: There is no deformity.        Left foot: There is no deformity.   Straight leg raise with discomfort left and right   Feet:   Right Foot:   Protective Sensation: 5 sites tested. 5 sites sensed.   Skin Integrity: Negative for ulcer, blister, skin breakdown, erythema or warmth.   Left Foot:   Protective Sensation: 5 sites tested. 5 sites sensed.   Skin Integrity: Negative for ulcer, blister, skin breakdown, erythema or warmth.   Neurological: He is alert and oriented to person, place, and time. Coordination normal.   Skin: Skin is warm and dry.   Psychiatric: He has a normal mood and affect. His behavior is normal. Thought content normal.     "

## 2018-04-20 DIAGNOSIS — Z13.5 DIABETIC RETINOPATHY SCREENING: ICD-10-CM

## 2018-04-23 ENCOUNTER — OFFICE VISIT (OUTPATIENT)
Dept: FAMILY MEDICINE | Facility: CLINIC | Age: 63
End: 2018-04-23
Payer: MEDICARE

## 2018-04-23 ENCOUNTER — LAB VISIT (OUTPATIENT)
Dept: LAB | Facility: HOSPITAL | Age: 63
End: 2018-04-23
Attending: INTERNAL MEDICINE
Payer: MEDICARE

## 2018-04-23 VITALS
BODY MASS INDEX: 24.9 KG/M2 | WEIGHT: 194 LBS | OXYGEN SATURATION: 97 % | DIASTOLIC BLOOD PRESSURE: 80 MMHG | TEMPERATURE: 98 F | HEART RATE: 76 BPM | SYSTOLIC BLOOD PRESSURE: 140 MMHG | HEIGHT: 74 IN

## 2018-04-23 DIAGNOSIS — F11.90 CHRONIC NARCOTIC USE: ICD-10-CM

## 2018-04-23 DIAGNOSIS — M1A.09X0 IDIOPATHIC CHRONIC GOUT OF MULTIPLE SITES WITHOUT TOPHUS: ICD-10-CM

## 2018-04-23 DIAGNOSIS — G89.29 CHRONIC MIDLINE LOW BACK PAIN WITHOUT SCIATICA: Primary | ICD-10-CM

## 2018-04-23 DIAGNOSIS — Z11.59 NEED FOR HEPATITIS C SCREENING TEST: ICD-10-CM

## 2018-04-23 DIAGNOSIS — E11.9 CONTROLLED TYPE 2 DIABETES MELLITUS WITHOUT COMPLICATION, WITHOUT LONG-TERM CURRENT USE OF INSULIN: ICD-10-CM

## 2018-04-23 DIAGNOSIS — I10 ESSENTIAL HYPERTENSION: ICD-10-CM

## 2018-04-23 DIAGNOSIS — I25.10 CORONARY ARTERY DISEASE INVOLVING NATIVE CORONARY ARTERY OF NATIVE HEART WITHOUT ANGINA PECTORIS: ICD-10-CM

## 2018-04-23 DIAGNOSIS — F17.200 SMOKER: ICD-10-CM

## 2018-04-23 DIAGNOSIS — M54.50 CHRONIC MIDLINE LOW BACK PAIN WITHOUT SCIATICA: Primary | ICD-10-CM

## 2018-04-23 PROBLEM — E11.29 CONTROLLED TYPE 2 DIABETES MELLITUS WITH MICROALBUMINURIA, WITHOUT LONG-TERM CURRENT USE OF INSULIN: Status: ACTIVE | Noted: 2017-10-24

## 2018-04-23 PROBLEM — R80.9 CONTROLLED TYPE 2 DIABETES MELLITUS WITH MICROALBUMINURIA, WITHOUT LONG-TERM CURRENT USE OF INSULIN: Status: ACTIVE | Noted: 2017-10-24

## 2018-04-23 LAB
ALBUMIN SERPL BCP-MCNC: 4 G/DL
ALP SERPL-CCNC: 109 U/L
ALT SERPL W/O P-5'-P-CCNC: 13 U/L
ANION GAP SERPL CALC-SCNC: 5 MMOL/L
AST SERPL-CCNC: 17 U/L
BILIRUB SERPL-MCNC: 0.4 MG/DL
BUN SERPL-MCNC: 12 MG/DL
CALCIUM SERPL-MCNC: 10.2 MG/DL
CHLORIDE SERPL-SCNC: 105 MMOL/L
CHOLEST SERPL-MCNC: 125 MG/DL
CHOLEST/HDLC SERPL: 3.5 {RATIO}
CO2 SERPL-SCNC: 30 MMOL/L
CREAT SERPL-MCNC: 1.2 MG/DL
EST. GFR  (AFRICAN AMERICAN): >60 ML/MIN/1.73 M^2
EST. GFR  (NON AFRICAN AMERICAN): >60 ML/MIN/1.73 M^2
ESTIMATED AVG GLUCOSE: 140 MG/DL
GLUCOSE SERPL-MCNC: 144 MG/DL
HBA1C MFR BLD HPLC: 6.5 %
HDLC SERPL-MCNC: 36 MG/DL
HDLC SERPL: 28.8 %
LDLC SERPL CALC-MCNC: 71.4 MG/DL
NONHDLC SERPL-MCNC: 89 MG/DL
POTASSIUM SERPL-SCNC: 4.5 MMOL/L
PROT SERPL-MCNC: 7.5 G/DL
SODIUM SERPL-SCNC: 140 MMOL/L
TRIGL SERPL-MCNC: 88 MG/DL
URATE SERPL-MCNC: 3.9 MG/DL

## 2018-04-23 PROCEDURE — 80053 COMPREHEN METABOLIC PANEL: CPT

## 2018-04-23 PROCEDURE — 84550 ASSAY OF BLOOD/URIC ACID: CPT

## 2018-04-23 PROCEDURE — 99214 OFFICE O/P EST MOD 30 MIN: CPT | Mod: S$PBB,,, | Performed by: INTERNAL MEDICINE

## 2018-04-23 PROCEDURE — 80061 LIPID PANEL: CPT

## 2018-04-23 PROCEDURE — 86803 HEPATITIS C AB TEST: CPT

## 2018-04-23 PROCEDURE — 36415 COLL VENOUS BLD VENIPUNCTURE: CPT | Mod: PO

## 2018-04-23 PROCEDURE — 99213 OFFICE O/P EST LOW 20 MIN: CPT | Mod: PBBFAC,PO | Performed by: INTERNAL MEDICINE

## 2018-04-23 PROCEDURE — 83036 HEMOGLOBIN GLYCOSYLATED A1C: CPT

## 2018-04-23 PROCEDURE — 99999 PR PBB SHADOW E&M-EST. PATIENT-LVL III: CPT | Mod: PBBFAC,,, | Performed by: INTERNAL MEDICINE

## 2018-04-23 RX ORDER — AMLODIPINE BESYLATE 2.5 MG/1
2.5 TABLET ORAL DAILY
Qty: 30 TABLET | Refills: 11 | Status: SHIPPED | OUTPATIENT
Start: 2018-04-23 | End: 2018-06-18 | Stop reason: SDUPTHER

## 2018-04-23 RX ORDER — HYDROCODONE BITARTRATE AND ACETAMINOPHEN 10; 325 MG/1; MG/1
TABLET ORAL
Qty: 30 TABLET | Refills: 0 | Status: SHIPPED | OUTPATIENT
Start: 2018-05-23 | End: 2018-06-22

## 2018-04-23 RX ORDER — HYDROCODONE BITARTRATE AND ACETAMINOPHEN 10; 325 MG/1; MG/1
TABLET ORAL
Qty: 30 TABLET | Refills: 0 | Status: SHIPPED | OUTPATIENT
Start: 2018-06-22 | End: 2018-06-18 | Stop reason: SDUPTHER

## 2018-04-23 RX ORDER — HYDROCODONE BITARTRATE AND ACETAMINOPHEN 10; 325 MG/1; MG/1
TABLET ORAL
Qty: 30 TABLET | Refills: 0 | Status: SHIPPED | OUTPATIENT
Start: 2018-04-23 | End: 2018-08-20 | Stop reason: SDUPTHER

## 2018-04-24 ENCOUNTER — TELEPHONE (OUTPATIENT)
Dept: FAMILY MEDICINE | Facility: CLINIC | Age: 63
End: 2018-04-24

## 2018-04-24 DIAGNOSIS — R76.8 POSITIVE HEPATITIS C ANTIBODY TEST: Primary | ICD-10-CM

## 2018-04-24 LAB — HCV AB SERPL QL IA: POSITIVE

## 2018-04-24 NOTE — TELEPHONE ENCOUNTER
Left message asking for pt to call back.  His urine showed small amount of protein. I ordered amlodipine for his BP yesterday and if he has not picked that up yet I would like to change that to lisinopril.     If he has picked up the amlodipine he can take it for 30 days and then not refill that.  But start lisinopril in a month.    Await pt call back.

## 2018-04-29 NOTE — PROGRESS NOTES
ADDENDUM  Old records rec'd from Dr. Lee's Office.    Current medications  Diovan//12.5  Allopurinol 100  Simvastatin 40 mg  Zantac 150 mg  BID  Plavix 75 mg  Metoprolol 50 mg twice daily  Flomax 0.4 mg  Colchicine 0.6 mg  Phenergan  Metformin 1000 mg twice daily  glimepiride 4 mg twice daily  Norco 10/325 #30  History of Fenofibrate 145 mg    Allergy ACEI    2/2/2017 a1c 7.9    Diabetes type II With microalbuminuria  COPD with a reported history of abnormal PFTs. AlbuterolNebulizer  Brooke, podiatry Doctor Jeromy  BPH, Flomax started 3/2017  RefluxHypercholesterolemiaUmbilical hernia repair 2002 9/8/2011 MRI lumbar spine schmorls nodes and ligamentous hypertrophy, root impingmenet noted  referred to [place] [facility] 9/17  Osteoarthritis of the knees with a history of injection    No labs included  No MRI report included  No colonoscopy/FOBT results included    [facility]

## 2018-04-29 NOTE — TELEPHONE ENCOUNTER
Already on valsartan so DC the rx sent in for lisinopril.    Old records rec'd from Dr. Lee, they state he has ACEI allergy so another reason to stop the lisinopril.    Hx of diovan HCT, pt currently on just diovan.     Would stay on amlodipine, stay on valsartan, and not start the lisinopril.  Hx of metoprolol, currently on carvedilol.    I never sent in lisinopril rx.    Please call pt - sorry for the confusion - I just got records from Dr. Lee's office - do not take lisinopril. Stay on carvedilol, stay on valsartan, I added on amlodipine, go ahead and take amlodipine.    Also, his lab test came back abnormal for the hepatitis C test and I need to see if this test is real or not.  Have him set up lab visit for hep c testing - hep C PCR.  Ordered.

## 2018-05-01 NOTE — TELEPHONE ENCOUNTER
Also - his screening test for hepatitis C came back abnormal - I need to run further tests to see if he really does have this or not - will order hepatitis C test.

## 2018-06-18 DIAGNOSIS — M54.50 CHRONIC MIDLINE LOW BACK PAIN WITHOUT SCIATICA: ICD-10-CM

## 2018-06-18 DIAGNOSIS — G89.29 CHRONIC MIDLINE LOW BACK PAIN WITHOUT SCIATICA: ICD-10-CM

## 2018-06-18 DIAGNOSIS — F11.90 CHRONIC NARCOTIC USE: ICD-10-CM

## 2018-06-18 DIAGNOSIS — I10 ESSENTIAL HYPERTENSION: ICD-10-CM

## 2018-06-18 RX ORDER — AMLODIPINE BESYLATE 2.5 MG/1
2.5 TABLET ORAL DAILY
Qty: 30 TABLET | Refills: 11 | Status: SHIPPED | OUTPATIENT
Start: 2018-06-18 | End: 2019-05-15 | Stop reason: SDUPTHER

## 2018-06-18 RX ORDER — HYDROCODONE BITARTRATE AND ACETAMINOPHEN 10; 325 MG/1; MG/1
TABLET ORAL
Qty: 30 TABLET | Refills: 0 | Status: SHIPPED | OUTPATIENT
Start: 2018-06-22 | End: 2018-07-16 | Stop reason: SDUPTHER

## 2018-06-18 NOTE — TELEPHONE ENCOUNTER
----- Message from Sahara Purdy sent at 6/18/2018  3:20 PM CDT -----  Contact: Self/[phone]  Refill:  hydrocodone-acetaminophen 10-325mg (NORCO)  mg Tab  Refill:  amLODIPine (NORVASC) 2.5 MG tablet      Pharmacy: [facility] on [address]   Tele:   [phone]    Thank you.

## 2018-07-06 ENCOUNTER — TELEPHONE (OUTPATIENT)
Dept: FAMILY MEDICINE | Facility: CLINIC | Age: 63
End: 2018-07-06

## 2018-07-06 NOTE — TELEPHONE ENCOUNTER
----- Message from Roselyn Villatoro sent at 7/6/2018  1:41 PM CDT -----  Contact: Self/ [phone]  Pt calling to notify office he has a new preferred pharmacy and would like to request all his medication refills be sent there for  Jul 18, 2018. Please call with status. Thank you.    [facility] [address] [place] [address] #[phone]

## 2018-07-16 DIAGNOSIS — F11.90 CHRONIC NARCOTIC USE: ICD-10-CM

## 2018-07-16 DIAGNOSIS — G89.29 CHRONIC MIDLINE LOW BACK PAIN WITHOUT SCIATICA: ICD-10-CM

## 2018-07-16 DIAGNOSIS — I10 ESSENTIAL HYPERTENSION: ICD-10-CM

## 2018-07-16 DIAGNOSIS — M54.50 CHRONIC MIDLINE LOW BACK PAIN WITHOUT SCIATICA: ICD-10-CM

## 2018-07-16 RX ORDER — CARVEDILOL 12.5 MG/1
12.5 TABLET ORAL 2 TIMES DAILY WITH MEALS
Qty: 60 TABLET | Refills: 11 | Status: SHIPPED | OUTPATIENT
Start: 2018-07-16 | End: 2018-07-17 | Stop reason: SDUPTHER

## 2018-07-16 RX ORDER — HYDROCODONE BITARTRATE AND ACETAMINOPHEN 10; 325 MG/1; MG/1
TABLET ORAL
Qty: 30 TABLET | Refills: 0 | Status: SHIPPED | OUTPATIENT
Start: 2018-07-16 | End: 2018-07-17 | Stop reason: SDUPTHER

## 2018-07-16 NOTE — TELEPHONE ENCOUNTER
----- Message from Susan Lebron sent at 7/16/2018  2:36 PM CDT -----  Contact: self  carvedilol (COREG) 12.5 MG tablet  HYDROcodone-acetaminophen (NORCO)  mg per tablet    Pt calling to request a refill of the above to be sent to [facility]. Please call [phone].

## 2018-07-17 RX ORDER — CARVEDILOL 12.5 MG/1
12.5 TABLET ORAL 2 TIMES DAILY WITH MEALS
Qty: 60 TABLET | Refills: 11 | Status: SHIPPED | OUTPATIENT
Start: 2018-07-17 | End: 2018-09-20 | Stop reason: SDUPTHER

## 2018-07-17 RX ORDER — HYDROCODONE BITARTRATE AND ACETAMINOPHEN 10; 325 MG/1; MG/1
TABLET ORAL
Qty: 30 TABLET | Refills: 0 | Status: SHIPPED | OUTPATIENT
Start: 2018-07-17 | End: 2018-08-16

## 2018-08-16 ENCOUNTER — TELEPHONE (OUTPATIENT)
Dept: FAMILY MEDICINE | Facility: CLINIC | Age: 63
End: 2018-08-16

## 2018-08-16 NOTE — TELEPHONE ENCOUNTER
----- Message from Sahara Purdy sent at 8/16/2018  3:11 PM CDT -----  Contact: Self/[phone]  Refill:  HYDROcodone-acetaminophen (NORCO)  mg per tablet    [facility] - [place], [place] - [address] [place] [address]  Phone: [phone] Fax: [phone]    Thank you.

## 2018-08-20 ENCOUNTER — TELEPHONE (OUTPATIENT)
Dept: FAMILY MEDICINE | Facility: CLINIC | Age: 63
End: 2018-08-20

## 2018-08-20 DIAGNOSIS — G89.29 CHRONIC MIDLINE LOW BACK PAIN WITHOUT SCIATICA: ICD-10-CM

## 2018-08-20 DIAGNOSIS — M54.50 CHRONIC MIDLINE LOW BACK PAIN WITHOUT SCIATICA: ICD-10-CM

## 2018-08-20 DIAGNOSIS — I10 ESSENTIAL HYPERTENSION: Primary | ICD-10-CM

## 2018-08-20 DIAGNOSIS — F11.90 CHRONIC NARCOTIC USE: ICD-10-CM

## 2018-08-20 RX ORDER — IRBESARTAN 300 MG/1
300 TABLET ORAL NIGHTLY
Qty: 90 TABLET | Refills: 3 | Status: SHIPPED | OUTPATIENT
Start: 2018-08-20 | End: 2019-05-15 | Stop reason: SDUPTHER

## 2018-08-20 RX ORDER — HYDROCODONE BITARTRATE AND ACETAMINOPHEN 10; 325 MG/1; MG/1
TABLET ORAL
Qty: 10 TABLET | Refills: 0 | Status: SHIPPED | OUTPATIENT
Start: 2018-08-20 | End: 2018-08-29 | Stop reason: SDUPTHER

## 2018-08-20 NOTE — TELEPHONE ENCOUNTER
----- Message from Eleni Arteaga sent at 8/17/2018  3:53 PM CDT -----  Contact: Self  Pt is calling to speak with staff regarding refills on his medication  HYDROcodone-acetaminophen (NORCO)  mg per tablet. He doesn't understand. Please call pt at [phone].

## 2018-08-20 NOTE — TELEPHONE ENCOUNTER
----- Message from Saroj Ramirez LPN sent at 8/20/2018  3:22 PM CDT -----  Contact: Self       ----- Message -----  From: Alisha Reid  Sent: 8/20/2018   3:03 PM  To: Anca Mercado Staff    Patient has been notified by the pharmacy that his medication has been recalled.  [phone].    valsartan (DIOVAN) 320 MG tablet    Pt is requesting an alternative    [facility] - [place], [place] - [address]

## 2018-08-28 NOTE — PROGRESS NOTES
This note was created by combination of typed  and Dragon dictation.  Transcription errors may be present.  If there are any questions, please contact me.    Assessment & Plan:   Chronic midline low back pain without sciatica  Chronic narcotic use  -stable on current dose, once daily use.  Refilled and post dated rx's sent.  -     HYDROcodone-acetaminophen (NORCO)  mg per tablet; take 1 tablet by mouth every 24 hours if needed for pain  Dispense: 30 tablet; Refill: 0  -     HYDROcodone-acetaminophen (NORCO)  mg per tablet; take 1 tablet by mouth every 24 hours if needed for pain  Dispense: 30 tablet; Refill: 0  -     HYDROcodone-acetaminophen (NORCO)  mg per tablet; take 1 tablet by mouth every 24 hours if needed for pain  Dispense: 30 tablet; Refill: 0    History of hepatitis C  -reports he was treated and cured.  [facility] what sounds like [facility].  [facility] to [facility].  -     Cancel: Hepatitis C RNA, quantitative, PCR; Future; Expected date: 08/29/2018    Will try to get outside records [facility] and [facility].    Medications Discontinued During This Encounter   Medication Reason    HYDROcodone-acetaminophen (NORCO)  mg per tablet Reorder     Modified Medications    Modified Medication Previous Medication    HYDROCODONE-ACETAMINOPHEN (NORCO)  MG PER TABLET HYDROcodone-acetaminophen (NORCO)  mg per tablet       take 1 tablet by mouth every 24 hours if needed for pain    take 1 tablet by mouth every 24 hours if needed for pain     New Prescriptions    HYDROCODONE-ACETAMINOPHEN (NORCO)  MG PER TABLET    take 1 tablet by mouth every 24 hours if needed for pain    HYDROCODONE-ACETAMINOPHEN (NORCO)  MG PER TABLET    take 1 tablet by mouth every 24 hours if needed for pain       Follow Up: No Follow-up on file. OV 3-4 months.        Subjective:     Chief Complaint   Patient presents with     Medication Refill       HPI  Paulino is a 62 y.o. male, last appointment with this clinic was 2018.    DM2 with microalbuminuria; metformin, glipizide  Chronic back pain reportedly after a fall and aggravated with chronic use.  Never surgery; recalls imaging in the past. Does recall having seen a specialist - near [place].  XR apparently showed arthritis. Never injections. Self directed PT. Chronic narcotic therapy.   2011 MRI lumbar spine schmorls nodes and ligamentous hypertrophy, root impingment noted   Reports colonoscopy done at [facility] [place] [facility] repeat 10 years. < 10 years ago by his recall - about 2 years.  Smoker, 1-2 cigarettes a day this time x 4 years.  Feels he can quit without assistance.  BPH,  Flomax started 3/2017  GERD, omeprazole  Gout, podagra and ankle, allopurinol  Plavix, unclear indication.  COPD with a reported history of abnormal PFTs. AlbuterolNebulizer  Brooke, podiatry Doctor Jeromy  Umbilical hernia repair   Osteoarthritis of the knees with a history of injection  Hx of HCV reportedly treated by a doctor in [place] with injections.  Reportedly cured.  Reports he [facility], [facility]    Cousin .  So did an uncle.  Taking it day by day.     Reports he was treated by a doctor in [place] with injection for HCV.  Reports he was cured.  Had liver biopsy as well. Does not know the results of the liver biopsy. But it sounds like the [facility] doctor recently ordered an [facility] liver?  Will [facility] to [facility].    Thinks he had the colonoscopy at [facility].  [facility] by the [facility] it sounds like.  EGD and colonoscopy.     [facility].     Changed to irbesartan from valsartan.  BP normal today.  Denies obvious SE of medication.     PEG-3 Assessment 2018   What number best described your pain on average in the past week? 5   What number best describes how, during the past week, pain has interfered with your enjoyment of life? 2   What number best describes how,  during the past week, pain has interfered with your general activity? 3   Score 10         Patient Care Team:  Rogelio March MD as PCP - General (Internal Medicine)  Fermin Lee Jr., MD as Consulting Physician (Family Medicine)    Patient Active Problem List    Diagnosis Date Noted    History of hepatitis C s/p treatment per pt.  [facility], reported [facility] liver 8/2018 08/29/2018    Idiopathic gout of multiple sites 11/24/2017    Chronic midline low back pain without sciatica; by report, 9/8/2011 MRI lumbar spine schmorl's nodes and ligamentous hypertrophy, root impingment noted 10/24/2017    Chronic narcotic use 10/24/2017    Essential hypertension 10/24/2017    Controlled type 2 diabetes mellitus with microalbuminuria, without long-term current use of insulin 10/24/2017    Coronary artery disease involving native coronary artery of native heart without angina pectoris 10/24/2017    Benign prostatic hyperplasia without lower urinary tract symptoms 10/24/2017    Smoker 10/24/2017       PAST MEDICAL HISTORY:  Past Medical History:   Diagnosis Date    Diabetes mellitus, type 2     Hypertension        PAST SURGICAL HISTORY:  History reviewed. No pertinent surgical history.    SOCIAL HISTORY:  Social History     Socioeconomic History    Marital status: Single     Spouse name: Not on file    Number of children: Not on file    Years of education: Not on file    Highest education level: Not on file   Social Needs    Financial resource strain: Not on file    Food insecurity - worry: Not on file    Food insecurity - inability: Not on file    Transportation needs - medical: Not on file    Transportation needs - non-medical: Not on file   Occupational History    Occupation: disabled - orthopedic   Tobacco Use    Smoking status: Current Every Day Smoker     Packs/day: 0.10     Types: Cigarettes    Smokeless tobacco: Never Used   Substance and Sexual Activity    Alcohol use: Not on  file    Drug use: No    Sexual activity: Yes   Other Topics Concern    Not on file   Social History Narrative    Not on file       ALLERGIES AND MEDICATIONS: updated and reviewed.  Review of patient's allergies indicates:   Allergen Reactions    Ace inhibitors      Current Outpatient Medications   Medication Sig Dispense Refill    allopurinol (ZYLOPRIM) 300 MG tablet Take 1 tablet (300 mg total) by mouth once daily. 90 tablet 0    amLODIPine (NORVASC) 2.5 MG tablet Take 1 tablet (2.5 mg total) by mouth once daily. 30 tablet 11    carvedilol (COREG) 12.5 MG tablet Take 1 tablet (12.5 mg total) by mouth 2 (two) times daily with meals. 60 tablet 11    clopidogrel (PLAVIX) 75 mg tablet Take 75 mg by mouth once daily.      finasteride (PROSCAR) 5 mg tablet Take 5 mg by mouth once daily.      glipiZIDE (GLUCOTROL) 5 MG tablet Take 1 tablet (5 mg total) by mouth 2 (two) times daily before meals. 60 tablet 11    HYDROcodone-acetaminophen (NORCO)  mg per tablet take 1 tablet by mouth every 24 hours if needed for pain 10 tablet 0    irbesartan (AVAPRO) 300 MG tablet Take 1 tablet (300 mg total) by mouth every evening. 90 tablet 3    metFORMIN (GLUCOPHAGE) 500 MG tablet Take 500 mg by mouth 2 (two) times daily with meals.      omeprazole (PRILOSEC) 20 MG capsule Take 20 mg by mouth once daily.      simvastatin (ZOCOR) 40 MG tablet Take 0.5 tablets (20 mg total) by mouth every evening. 90 tablet 3     No current facility-administered medications for this visit.        Review of Systems   Constitutional: Negative for chills and fever.   Respiratory: Negative for shortness of breath.    Cardiovascular: Negative for chest pain and palpitations.       Objective:   Physical Exam   Vitals:    08/29/18 1028 08/29/18 1054   BP: 130/88 130/84   BP Location:  Left arm   Patient Position:  Sitting   BP Method:  Medium (Manual)   Pulse: 72    Temp: 98 °F (36.7 °C)    SpO2: 97%    Weight: 87.9 kg (193 lb 12.6 oz)   "  Height: 6' 2" (1.88 m)     Body mass index is 24.88 kg/m².  Weight: 87.9 kg (193 lb 12.6 oz)   Height: 6' 2" (188 cm)     Physical Exam   Constitutional: He is oriented to person, place, and time. He appears well-developed and well-nourished. No distress.   Eyes: EOM are normal.   Cardiovascular: Normal rate, regular rhythm and normal heart sounds.   No murmur heard.  Pulmonary/Chest: Effort normal and breath sounds normal.   Musculoskeletal: He exhibits no edema.   Seated straight leg raise with pain in the lower spine with both left and right leg raise   Neurological: He is alert and oriented to person, place, and time. Coordination normal.   Skin: Skin is warm and dry.   Psychiatric: He has a normal mood and affect. His behavior is normal. Thought content normal.     "

## 2018-08-29 ENCOUNTER — OFFICE VISIT (OUTPATIENT)
Dept: FAMILY MEDICINE | Facility: CLINIC | Age: 63
End: 2018-08-29
Payer: MEDICARE

## 2018-08-29 VITALS
BODY MASS INDEX: 24.87 KG/M2 | HEIGHT: 74 IN | HEART RATE: 72 BPM | TEMPERATURE: 98 F | OXYGEN SATURATION: 97 % | SYSTOLIC BLOOD PRESSURE: 130 MMHG | WEIGHT: 193.81 LBS | DIASTOLIC BLOOD PRESSURE: 84 MMHG

## 2018-08-29 DIAGNOSIS — Z86.19 HISTORY OF HEPATITIS C: ICD-10-CM

## 2018-08-29 DIAGNOSIS — F11.90 CHRONIC NARCOTIC USE: ICD-10-CM

## 2018-08-29 DIAGNOSIS — G89.29 CHRONIC MIDLINE LOW BACK PAIN WITHOUT SCIATICA: Primary | ICD-10-CM

## 2018-08-29 DIAGNOSIS — M54.50 CHRONIC MIDLINE LOW BACK PAIN WITHOUT SCIATICA: Primary | ICD-10-CM

## 2018-08-29 PROCEDURE — 99214 OFFICE O/P EST MOD 30 MIN: CPT | Mod: S$PBB,,, | Performed by: INTERNAL MEDICINE

## 2018-08-29 PROCEDURE — 99999 PR PBB SHADOW E&M-EST. PATIENT-LVL III: CPT | Mod: PBBFAC,,, | Performed by: INTERNAL MEDICINE

## 2018-08-29 PROCEDURE — 99213 OFFICE O/P EST LOW 20 MIN: CPT | Mod: PBBFAC,PO | Performed by: INTERNAL MEDICINE

## 2018-08-29 RX ORDER — HYDROCODONE BITARTRATE AND ACETAMINOPHEN 10; 325 MG/1; MG/1
TABLET ORAL
Qty: 30 TABLET | Refills: 0 | Status: SHIPPED | OUTPATIENT
Start: 2018-09-20 | End: 2018-09-27 | Stop reason: SDUPTHER

## 2018-08-29 RX ORDER — HYDROCODONE BITARTRATE AND ACETAMINOPHEN 10; 325 MG/1; MG/1
TABLET ORAL
Qty: 30 TABLET | Refills: 0 | Status: SHIPPED | OUTPATIENT
Start: 2018-10-20 | End: 2018-08-29 | Stop reason: SDUPTHER

## 2018-08-29 RX ORDER — HYDROCODONE BITARTRATE AND ACETAMINOPHEN 10; 325 MG/1; MG/1
TABLET ORAL
Qty: 30 TABLET | Refills: 0 | Status: SHIPPED | OUTPATIENT
Start: 2018-11-20 | End: 2018-11-26 | Stop reason: SDUPTHER

## 2018-08-29 RX ORDER — HYDROCODONE BITARTRATE AND ACETAMINOPHEN 10; 325 MG/1; MG/1
TABLET ORAL
Qty: 30 TABLET | Refills: 0 | Status: SHIPPED | OUTPATIENT
Start: 2018-08-29 | End: 2018-09-28

## 2018-09-20 DIAGNOSIS — I10 ESSENTIAL HYPERTENSION: ICD-10-CM

## 2018-09-20 RX ORDER — CARVEDILOL 12.5 MG/1
12.5 TABLET ORAL 2 TIMES DAILY WITH MEALS
Qty: 180 TABLET | Refills: 3 | Status: SHIPPED | OUTPATIENT
Start: 2018-09-20 | End: 2019-03-18 | Stop reason: SDUPTHER

## 2018-09-27 ENCOUNTER — TELEPHONE (OUTPATIENT)
Dept: FAMILY MEDICINE | Facility: CLINIC | Age: 63
End: 2018-09-27

## 2018-09-27 DIAGNOSIS — M54.50 CHRONIC MIDLINE LOW BACK PAIN WITHOUT SCIATICA: ICD-10-CM

## 2018-09-27 DIAGNOSIS — F11.90 CHRONIC NARCOTIC USE: ICD-10-CM

## 2018-09-27 DIAGNOSIS — G89.29 CHRONIC MIDLINE LOW BACK PAIN WITHOUT SCIATICA: ICD-10-CM

## 2018-09-27 RX ORDER — HYDROCODONE BITARTRATE AND ACETAMINOPHEN 10; 325 MG/1; MG/1
TABLET ORAL
Qty: 30 TABLET | Refills: 0 | Status: SHIPPED | OUTPATIENT
Start: 2018-09-27 | End: 2018-10-26 | Stop reason: SDUPTHER

## 2018-09-27 NOTE — TELEPHONE ENCOUNTER
----- Message from Miranda Lee sent at 9/27/2018 10:40 AM CDT -----  Contact: Self  Patient request medication refill. Please call [phone]      HYDROcodone-acetaminophen (NORCO)  mg per tablet        [facility] - [place], [place] - [address]

## 2018-10-26 ENCOUNTER — TELEPHONE (OUTPATIENT)
Dept: FAMILY MEDICINE | Facility: CLINIC | Age: 63
End: 2018-10-26

## 2018-10-26 DIAGNOSIS — F11.90 CHRONIC NARCOTIC USE: ICD-10-CM

## 2018-10-26 DIAGNOSIS — G89.29 CHRONIC MIDLINE LOW BACK PAIN WITHOUT SCIATICA: ICD-10-CM

## 2018-10-26 DIAGNOSIS — M54.50 CHRONIC MIDLINE LOW BACK PAIN WITHOUT SCIATICA: ICD-10-CM

## 2018-10-26 RX ORDER — HYDROCODONE BITARTRATE AND ACETAMINOPHEN 10; 325 MG/1; MG/1
TABLET ORAL
Qty: 30 TABLET | Refills: 0 | Status: SHIPPED | OUTPATIENT
Start: 2018-10-26 | End: 2018-11-26

## 2018-10-26 NOTE — TELEPHONE ENCOUNTER
----- Message from Eleni Arteaga sent at 10/26/2018  9:44 AM CDT -----  Contact: Self  Last refill was 09/27/2018. Needs medication today. Please call pt at [phone]

## 2018-10-26 NOTE — TELEPHONE ENCOUNTER
----- Message from Nickolas Alvarez sent at 10/26/2018  9:36 AM CDT -----  Contact: self  Refill: HYDROcodone-acetaminophen (NORCO)  mg per tablet. [facility] [phone]. Pt [phone].

## 2018-11-02 DIAGNOSIS — Z12.11 COLON CANCER SCREENING: ICD-10-CM

## 2018-11-02 DIAGNOSIS — E11.9 TYPE 2 DIABETES MELLITUS WITHOUT COMPLICATION: ICD-10-CM

## 2018-11-26 DIAGNOSIS — F11.90 CHRONIC NARCOTIC USE: ICD-10-CM

## 2018-11-26 DIAGNOSIS — M54.50 CHRONIC MIDLINE LOW BACK PAIN WITHOUT SCIATICA: ICD-10-CM

## 2018-11-26 DIAGNOSIS — G89.29 CHRONIC MIDLINE LOW BACK PAIN WITHOUT SCIATICA: ICD-10-CM

## 2018-11-26 RX ORDER — HYDROCODONE BITARTRATE AND ACETAMINOPHEN 10; 325 MG/1; MG/1
TABLET ORAL
Qty: 30 TABLET | Refills: 0 | Status: SHIPPED | OUTPATIENT
Start: 2018-11-26 | End: 2018-12-17 | Stop reason: SDUPTHER

## 2018-11-26 NOTE — TELEPHONE ENCOUNTER
----- Message from Kaelyn Soriano sent at 11/26/2018  9:32 AM CST -----  Contact: self - [phone]  Refill request for--- HYDROcodone-acetaminophen (NORCO)  mg per tablet      ...  [facility] - [place], [place] - [address] [place] [address]  Phone: [phone] Fax: [phone]

## 2018-12-03 ENCOUNTER — TELEPHONE (OUTPATIENT)
Dept: FAMILY MEDICINE | Facility: CLINIC | Age: 63
End: 2018-12-03

## 2018-12-04 NOTE — TELEPHONE ENCOUNTER
----- Message from Skylar Garcia sent at 12/3/2018  3:57 PM CST -----  Contact: Self  Patient called because he received a test tube and patient does not understand what it is. Please call to discuss at [phone]

## 2018-12-04 NOTE — TELEPHONE ENCOUNTER
Spoke with patient and advised to test is for stool test for colon cancer    He is not sure what to do with testing supplies that were mailed to him.     Advised to come in to speak with the nurse and she can advised how to collect specimen correctly for testing

## 2018-12-17 DIAGNOSIS — M54.50 CHRONIC MIDLINE LOW BACK PAIN WITHOUT SCIATICA: ICD-10-CM

## 2018-12-17 DIAGNOSIS — F11.90 CHRONIC NARCOTIC USE: ICD-10-CM

## 2018-12-17 DIAGNOSIS — G89.29 CHRONIC MIDLINE LOW BACK PAIN WITHOUT SCIATICA: ICD-10-CM

## 2018-12-17 RX ORDER — HYDROCODONE BITARTRATE AND ACETAMINOPHEN 10; 325 MG/1; MG/1
TABLET ORAL
Qty: 30 TABLET | Refills: 0 | Status: SHIPPED | OUTPATIENT
Start: 2018-12-17 | End: 2019-01-16

## 2018-12-31 NOTE — PROGRESS NOTES
This note was created by combination of typed  and Dragon dictation.  Transcription errors may be present.  If there are any questions, please contact me.    Assessment & Plan:   Chronic narcotic use  -not due for refill yet.  Stable on current regimen.  States [facility] the senior center with water aerobics.  No change.    Coronary artery disease involving native coronary artery of native heart without angina pectoris  -[facility].  Simvastatin, Plavix.  -     simvastatin (ZOCOR) 40 MG tablet; Take 0.5 tablets (20 mg total) by mouth every evening.  Dispense: 90 tablet; Refill: 3    Idiopathic chronic gout of multiple sites without tophus  -check uric acid on allopurinol  -     Uric acid; Future; Expected date: 01/02/2019    Controlled type 2 diabetes mellitus without complication, without long-term current use of insulin  Controlled type 2 diabetes mellitus with microalbuminuria, without long-term current use of insulin  -check labs, A1c, microalbumin.  [facility].  He is going to get [facility] and I have asked him to get any future exam reports to me.  -     CBC auto differential; Future; Expected date: 01/02/2019  -     Comprehensive metabolic panel; Future; Expected date: 01/02/2019  -     Lipid panel; Future; Expected date: 01/02/2019  -     Hemoglobin A1c; Future; Expected date: 01/02/2019  -     Microalbumin/creatinine urine ratio; Future; Expected date: 01/02/2019    Essential hypertension  -stable on current regimen    He does not recall the [facility] of [facility] the [facility]. I have asked him to find out and update me if possible    Outside records requested for [facility] for EGD and colonoscopy results.    [facility].    Medications Discontinued During This Encounter   Medication Reason    simvastatin (ZOCOR) 40 MG tablet Reorder       meds sent this  encounter:  Medications Ordered This Encounter   Medications    simvastatin (ZOCOR) 40 MG tablet     Sig: Take 0.5 tablets (20 mg total) by mouth every evening.     Dispense:  90 tablet     Refill:  3       Follow Up: No Follow-up on file. plan for follow up 6 months.  [facility]    Subjective:     Chief Complaint   Patient presents with    Hypertension    Hyperlipidemia       HPI  Paulino is a 63 y.o. male, last appointment with this clinic was 8/29/2018.    DM2 with microalbuminuria; metformin, glipizide  Chronic back pain reportedly after a fall and aggravated with chronic use.  Never surgery; recalls imaging in the past. Does recall having seen a specialist - gato Khalil.  XR apparently showed arthritis. Never injections. Self directed PT. Chronic narcotic therapy.   9/8/2011 MRI lumbar spine schmorls nodes and ligamentous hypertrophy, root impingment noted   Reports colon[place] done [place] repeat 10 years. < 10 years ago by his recall - about 2 years.  Smoker, 1-2 cigarettes a day this time x 4 years.  Feels he can quit without assistance.  BPH,  Flomax started 3/2017  GERD, omeprazole  Gout, podagra and ankle, allopurinol  Plavix, unclear indication.  COPD with a reported history of abnormal PFTs. AlbuterolNebulizer  [place], podiatry Doctor Jeromy  Umbilical hernia repair 2002  Osteoarthritis of the knees with a history of injection  Hx of HCV reportedly treated by a doctor in [place] with injections.  Reportedly cured.  Reports [facility], [facility]    Last seen 8/2018  norco 10 1 daily   12/20 no aberrancy    Needs labs, eye, shots  Need colonoscopy records.  Recalls having EGD and colonoscopy at [place]. Maybe 2016?      Physical activity - senior center - water aerobics.     [facility] 1[facility].  He [facility] as well.   [facility] flu shot [facility].  Caused him to feel poorly.  He reports UTD with pneumovax and shingles shot as  well.     Patient Care Team:  Rogelio March MD as PCP - General (Internal Medicine)  Fermin Lee Jr., MD as Consulting Physician (Family Medicine)    Patient Active Problem List    Diagnosis Date Noted    History of hepatitis C s/p treatment per pt.  [facility], reported [facility] liver 8/2018 08/29/2018    Idiopathic gout of multiple sites 11/24/2017    Chronic midline low back pain without sciatica; by report, 9/8/2011 MRI lumbar spine schmorl's nodes and ligamentous hypertrophy, root impingment noted 10/24/2017    Chronic narcotic use 10/24/2017    Essential hypertension 10/24/2017    Controlled type 2 diabetes mellitus with microalbuminuria, without long-term current use of insulin 10/24/2017    Coronary artery disease involving native coronary artery of native heart without angina pectoris 10/24/2017    Benign prostatic hyperplasia without lower urinary tract symptoms 10/24/2017    Smoker 10/24/2017       PAST MEDICAL HISTORY:  Past Medical History:   Diagnosis Date    Diabetes mellitus, type 2     Hyperlipidemia     Hypertension        PAST SURGICAL HISTORY:  History reviewed. No pertinent surgical history.    SOCIAL HISTORY:  Social History     Socioeconomic History    Marital status: Single     Spouse name: Not on file    Number of children: Not on file    Years of education: Not on file    Highest education level: Not on file   Social Needs    Financial resource strain: Not on file    Food insecurity - worry: Not on file    Food insecurity - inability: Not on file    Transportation needs - medical: Not on file    Transportation needs - non-medical: Not on file   Occupational History    Occupation: disabled - orthopedic   Tobacco Use    Smoking status: Current Every Day Smoker     Packs/day: 0.10     Types: Cigarettes    Smokeless tobacco: Never Used   Substance and Sexual Activity    Alcohol use: Not on file    Drug use: No    Sexual activity: Yes   Other Topics  "Concern    Not on file   Social History Narrative    Not on file       ALLERGIES AND MEDICATIONS: updated and reviewed.  Review of patient's allergies indicates:   Allergen Reactions    Ace inhibitors      Current Outpatient Medications   Medication Sig Dispense Refill    allopurinol (ZYLOPRIM) 300 MG tablet Take 1 tablet (300 mg total) by mouth once daily. 90 tablet 0    amLODIPine (NORVASC) 2.5 MG tablet Take 1 tablet (2.5 mg total) by mouth once daily. 30 tablet 11    carvedilol (COREG) 12.5 MG tablet Take 1 tablet (12.5 mg total) by mouth 2 (two) times daily with meals. 180 tablet 3    clopidogrel (PLAVIX) 75 mg tablet Take 75 mg by mouth once daily.      finasteride (PROSCAR) 5 mg tablet Take 5 mg by mouth once daily.      glipiZIDE (GLUCOTROL) 5 MG tablet Take 1 tablet (5 mg total) by mouth 2 (two) times daily before meals. 60 tablet 11    HYDROcodone-acetaminophen (NORCO)  mg per tablet take 1 tablet by mouth every 24 hours if needed for pain 30 tablet 0    irbesartan (AVAPRO) 300 MG tablet Take 1 tablet (300 mg total) by mouth every evening. 90 tablet 3    metFORMIN (GLUCOPHAGE) 500 MG tablet Take 500 mg by mouth 2 (two) times daily with meals.      omeprazole (PRILOSEC) 20 MG capsule Take 20 mg by mouth once daily.      simvastatin (ZOCOR) 40 MG tablet Take 0.5 tablets (20 mg total) by mouth every evening. 90 tablet 3     No current facility-administered medications for this visit.        Review of Systems   Constitutional: Negative for chills and fever.   Respiratory: Negative for shortness of breath.    Cardiovascular: Negative for chest pain and palpitations.       Objective:   Physical Exam   Vitals:    01/02/19 0925   BP: 130/84   Pulse: 70   Temp: 98 °F (36.7 °C)   SpO2: 95%   Weight: 87.5 kg (192 lb 14.4 oz)   Height: 6' 2" (1.88 m)    Body mass index is 24.77 kg/m².  Weight: 87.5 kg (192 lb 14.4 oz)   Height: 6' 2" (188 cm)     Physical Exam   Constitutional: He is oriented to " person, place, and time. He appears well-developed and well-nourished. No distress.   Eyes: EOM are normal.   Cardiovascular: Normal rate, regular rhythm and normal heart sounds.   No murmur heard.  PT pulse 1+ bilaterally   Pulmonary/Chest: Effort normal and breath sounds normal.   Musculoskeletal: Normal range of motion. He exhibits no edema.   Neurological: He is alert and oriented to person, place, and time. Coordination normal.   Skin: Skin is warm and dry.   Psychiatric: He has a normal mood and affect. His behavior is normal. Thought content normal.

## 2019-01-02 ENCOUNTER — LAB VISIT (OUTPATIENT)
Dept: LAB | Facility: HOSPITAL | Age: 64
End: 2019-01-02
Attending: INTERNAL MEDICINE
Payer: MEDICARE

## 2019-01-02 ENCOUNTER — OFFICE VISIT (OUTPATIENT)
Dept: FAMILY MEDICINE | Facility: CLINIC | Age: 64
End: 2019-01-02
Payer: MEDICARE

## 2019-01-02 VITALS
HEIGHT: 74 IN | BODY MASS INDEX: 24.75 KG/M2 | SYSTOLIC BLOOD PRESSURE: 130 MMHG | WEIGHT: 192.88 LBS | OXYGEN SATURATION: 95 % | DIASTOLIC BLOOD PRESSURE: 84 MMHG | TEMPERATURE: 98 F | HEART RATE: 70 BPM

## 2019-01-02 DIAGNOSIS — E11.9 CONTROLLED TYPE 2 DIABETES MELLITUS WITHOUT COMPLICATION, WITHOUT LONG-TERM CURRENT USE OF INSULIN: ICD-10-CM

## 2019-01-02 DIAGNOSIS — E11.29 CONTROLLED TYPE 2 DIABETES MELLITUS WITH MICROALBUMINURIA, WITHOUT LONG-TERM CURRENT USE OF INSULIN: ICD-10-CM

## 2019-01-02 DIAGNOSIS — M1A.09X0 IDIOPATHIC CHRONIC GOUT OF MULTIPLE SITES WITHOUT TOPHUS: ICD-10-CM

## 2019-01-02 DIAGNOSIS — R80.9 CONTROLLED TYPE 2 DIABETES MELLITUS WITH MICROALBUMINURIA, WITHOUT LONG-TERM CURRENT USE OF INSULIN: ICD-10-CM

## 2019-01-02 DIAGNOSIS — I10 ESSENTIAL HYPERTENSION: ICD-10-CM

## 2019-01-02 DIAGNOSIS — I25.10 CORONARY ARTERY DISEASE INVOLVING NATIVE CORONARY ARTERY OF NATIVE HEART WITHOUT ANGINA PECTORIS: ICD-10-CM

## 2019-01-02 DIAGNOSIS — F11.90 CHRONIC NARCOTIC USE: Primary | ICD-10-CM

## 2019-01-02 LAB
ALBUMIN SERPL BCP-MCNC: 3.7 G/DL
ALP SERPL-CCNC: 125 U/L
ALT SERPL W/O P-5'-P-CCNC: 18 U/L
ANION GAP SERPL CALC-SCNC: 8 MMOL/L
AST SERPL-CCNC: 26 U/L
BASOPHILS # BLD AUTO: 0.04 K/UL
BASOPHILS NFR BLD: 0.5 %
BILIRUB SERPL-MCNC: 0.4 MG/DL
BUN SERPL-MCNC: 17 MG/DL
CALCIUM SERPL-MCNC: 9.7 MG/DL
CHLORIDE SERPL-SCNC: 110 MMOL/L
CHOLEST SERPL-MCNC: 94 MG/DL
CHOLEST/HDLC SERPL: 3 {RATIO}
CO2 SERPL-SCNC: 26 MMOL/L
CREAT SERPL-MCNC: 1.1 MG/DL
DIFFERENTIAL METHOD: ABNORMAL
EOSINOPHIL # BLD AUTO: 0.2 K/UL
EOSINOPHIL NFR BLD: 1.8 %
ERYTHROCYTE [DISTWIDTH] IN BLOOD BY AUTOMATED COUNT: 14.9 %
EST. GFR  (AFRICAN AMERICAN): >60 ML/MIN/1.73 M^2
EST. GFR  (NON AFRICAN AMERICAN): >60 ML/MIN/1.73 M^2
ESTIMATED AVG GLUCOSE: 157 MG/DL
GLUCOSE SERPL-MCNC: 85 MG/DL
HBA1C MFR BLD HPLC: 7.1 %
HCT VFR BLD AUTO: 39.6 %
HDLC SERPL-MCNC: 31 MG/DL
HDLC SERPL: 33 %
HGB BLD-MCNC: 12.7 G/DL
IMM GRANULOCYTES # BLD AUTO: 0.02 K/UL
IMM GRANULOCYTES NFR BLD AUTO: 0.2 %
LDLC SERPL CALC-MCNC: 52.2 MG/DL
LYMPHOCYTES # BLD AUTO: 3.9 K/UL
LYMPHOCYTES NFR BLD: 45.2 %
MCH RBC QN AUTO: 30.6 PG
MCHC RBC AUTO-ENTMCNC: 32.1 G/DL
MCV RBC AUTO: 95 FL
MONOCYTES # BLD AUTO: 0.8 K/UL
MONOCYTES NFR BLD: 8.8 %
NEUTROPHILS # BLD AUTO: 3.8 K/UL
NEUTROPHILS NFR BLD: 43.5 %
NONHDLC SERPL-MCNC: 63 MG/DL
NRBC BLD-RTO: 0 /100 WBC
PLATELET # BLD AUTO: 195 K/UL
PMV BLD AUTO: 11.1 FL
POTASSIUM SERPL-SCNC: 3.9 MMOL/L
PROT SERPL-MCNC: 7.2 G/DL
RBC # BLD AUTO: 4.15 M/UL
SODIUM SERPL-SCNC: 144 MMOL/L
TRIGL SERPL-MCNC: 54 MG/DL
URATE SERPL-MCNC: 3 MG/DL
WBC # BLD AUTO: 8.72 K/UL

## 2019-01-02 PROCEDURE — 99214 PR OFFICE/OUTPT VISIT, EST, LEVL IV, 30-39 MIN: ICD-10-PCS | Mod: S$PBB,,, | Performed by: INTERNAL MEDICINE

## 2019-01-02 PROCEDURE — 99214 OFFICE O/P EST MOD 30 MIN: CPT | Mod: S$PBB,,, | Performed by: INTERNAL MEDICINE

## 2019-01-02 PROCEDURE — 83036 HEMOGLOBIN GLYCOSYLATED A1C: CPT

## 2019-01-02 PROCEDURE — 84550 ASSAY OF BLOOD/URIC ACID: CPT

## 2019-01-02 PROCEDURE — 99213 OFFICE O/P EST LOW 20 MIN: CPT | Mod: PBBFAC,PO | Performed by: INTERNAL MEDICINE

## 2019-01-02 PROCEDURE — 85025 COMPLETE CBC W/AUTO DIFF WBC: CPT

## 2019-01-02 PROCEDURE — 99999 PR PBB SHADOW E&M-EST. PATIENT-LVL III: CPT | Mod: PBBFAC,,, | Performed by: INTERNAL MEDICINE

## 2019-01-02 PROCEDURE — 80053 COMPREHEN METABOLIC PANEL: CPT

## 2019-01-02 PROCEDURE — 36415 COLL VENOUS BLD VENIPUNCTURE: CPT | Mod: PO

## 2019-01-02 PROCEDURE — 99999 PR PBB SHADOW E&M-EST. PATIENT-LVL III: ICD-10-PCS | Mod: PBBFAC,,, | Performed by: INTERNAL MEDICINE

## 2019-01-02 PROCEDURE — 80061 LIPID PANEL: CPT

## 2019-01-02 RX ORDER — SIMVASTATIN 40 MG/1
20 TABLET, FILM COATED ORAL NIGHTLY
Qty: 90 TABLET | Refills: 3
Start: 2019-01-02 | End: 2020-11-06 | Stop reason: ALTCHOICE

## 2019-01-06 DIAGNOSIS — M1A.09X0 IDIOPATHIC CHRONIC GOUT OF MULTIPLE SITES WITHOUT TOPHUS: ICD-10-CM

## 2019-01-06 DIAGNOSIS — D64.9 NORMOCYTIC ANEMIA: Primary | ICD-10-CM

## 2019-01-06 RX ORDER — ALLOPURINOL 100 MG/1
100 TABLET ORAL DAILY
Qty: 90 TABLET | Refills: 3 | OUTPATIENT
Start: 2019-01-06 | End: 2019-05-16 | Stop reason: SDUPTHER

## 2019-01-06 NOTE — PROGRESS NOTES
Uric acid very low on allopurinol 300 would decrease to 100  a1c OK on current regimen no change.  Lipid low on zocor 1/2 tablet. No change  CBC mild normocytic anemia.  He recalls having had EGD and Colonoscpy at [facility], we're trying to get copies.     Please call pt - cut down on allopurinol. [facility] - I will print out rx for him to send in.  His blood count is slightly low.  We need to monitor this. Return in 6 months check labs then.

## 2019-01-14 ENCOUNTER — TELEPHONE (OUTPATIENT)
Dept: FAMILY MEDICINE | Facility: CLINIC | Age: 64
End: 2019-01-14

## 2019-01-14 NOTE — TELEPHONE ENCOUNTER
----- Message from Rogelio March MD sent at 1/6/2019  4:55 PM CST -----  Uric acid very low on allopurinol 300 would decrease to 100  a1c OK on current regimen no change.  Lipid low on zocor 1/2 tablet. No change  CBC mild normocytic anemia.  He recalls having had EGD and Colonoscpy at [facility], we're trying to get copies.     Please call pt - cut down on allopurinol. [facility] - I will print out rx for him to send in.  His blood count is slightly low.  We need to monitor this. Return in 6 months check labs then.

## 2019-01-18 ENCOUNTER — TELEPHONE (OUTPATIENT)
Dept: FAMILY MEDICINE | Facility: CLINIC | Age: 64
End: 2019-01-18

## 2019-01-18 DIAGNOSIS — F11.90 CHRONIC NARCOTIC USE: Primary | ICD-10-CM

## 2019-01-18 DIAGNOSIS — G89.29 CHRONIC MIDLINE LOW BACK PAIN WITHOUT SCIATICA: ICD-10-CM

## 2019-01-18 DIAGNOSIS — M54.50 CHRONIC MIDLINE LOW BACK PAIN WITHOUT SCIATICA: ICD-10-CM

## 2019-01-18 RX ORDER — HYDROCODONE BITARTRATE AND ACETAMINOPHEN 10; 325 MG/1; MG/1
1 TABLET ORAL
Qty: 30 TABLET | Refills: 0 | Status: SHIPPED | OUTPATIENT
Start: 2019-01-18 | End: 2019-02-18 | Stop reason: SDUPTHER

## 2019-01-18 NOTE — TELEPHONE ENCOUNTER
Patient wants pain medication for Norco but its not on his recent med list .  ----- Message from Sahara Purdy sent at 1/18/2019 11:10 AM CST -----  Contact: Self/[phone]  Refill:  HYDROcodone-acetaminophen (NORCO)  mg per tablet    .  [facility] - [place], [place] - [address] [place] [address]  Phone: [phone] Fax: [phone]    Thank you.

## 2019-02-18 DIAGNOSIS — F11.90 CHRONIC NARCOTIC USE: ICD-10-CM

## 2019-02-18 DIAGNOSIS — M54.50 CHRONIC MIDLINE LOW BACK PAIN WITHOUT SCIATICA: ICD-10-CM

## 2019-02-18 DIAGNOSIS — G89.29 CHRONIC MIDLINE LOW BACK PAIN WITHOUT SCIATICA: ICD-10-CM

## 2019-02-18 RX ORDER — HYDROCODONE BITARTRATE AND ACETAMINOPHEN 10; 325 MG/1; MG/1
1 TABLET ORAL
Qty: 30 TABLET | Refills: 0 | Status: SHIPPED | OUTPATIENT
Start: 2019-02-18 | End: 2019-03-18 | Stop reason: SDUPTHER

## 2019-03-18 DIAGNOSIS — I10 ESSENTIAL HYPERTENSION: ICD-10-CM

## 2019-03-18 DIAGNOSIS — G89.29 CHRONIC MIDLINE LOW BACK PAIN WITHOUT SCIATICA: ICD-10-CM

## 2019-03-18 DIAGNOSIS — M54.50 CHRONIC MIDLINE LOW BACK PAIN WITHOUT SCIATICA: ICD-10-CM

## 2019-03-18 DIAGNOSIS — F11.90 CHRONIC NARCOTIC USE: ICD-10-CM

## 2019-03-18 RX ORDER — CARVEDILOL 12.5 MG/1
12.5 TABLET ORAL 2 TIMES DAILY WITH MEALS
Qty: 180 TABLET | Refills: 3 | Status: SHIPPED | OUTPATIENT
Start: 2019-03-18 | End: 2020-04-17 | Stop reason: SDUPTHER

## 2019-03-18 RX ORDER — HYDROCODONE BITARTRATE AND ACETAMINOPHEN 10; 325 MG/1; MG/1
1 TABLET ORAL
Qty: 30 TABLET | Refills: 0 | Status: SHIPPED | OUTPATIENT
Start: 2019-03-18 | End: 2019-04-17 | Stop reason: SDUPTHER

## 2019-04-17 DIAGNOSIS — G89.29 CHRONIC MIDLINE LOW BACK PAIN WITHOUT SCIATICA: ICD-10-CM

## 2019-04-17 DIAGNOSIS — F11.90 CHRONIC NARCOTIC USE: ICD-10-CM

## 2019-04-17 DIAGNOSIS — M54.50 CHRONIC MIDLINE LOW BACK PAIN WITHOUT SCIATICA: ICD-10-CM

## 2019-04-17 RX ORDER — HYDROCODONE BITARTRATE AND ACETAMINOPHEN 10; 325 MG/1; MG/1
1 TABLET ORAL
Qty: 30 TABLET | Refills: 0 | Status: SHIPPED | OUTPATIENT
Start: 2019-04-17 | End: 2019-05-15 | Stop reason: SDUPTHER

## 2019-04-26 DIAGNOSIS — E11.9 TYPE 2 DIABETES MELLITUS WITHOUT COMPLICATION, UNSPECIFIED WHETHER LONG TERM INSULIN USE: ICD-10-CM

## 2019-05-14 PROBLEM — K21.9 GERD (GASTROESOPHAGEAL REFLUX DISEASE): Status: ACTIVE | Noted: 2019-05-14

## 2019-05-14 PROBLEM — J44.9 COPD (CHRONIC OBSTRUCTIVE PULMONARY DISEASE): Status: ACTIVE | Noted: 2019-05-14

## 2019-05-14 PROBLEM — M17.0 PRIMARY OSTEOARTHRITIS OF BOTH KNEES: Status: ACTIVE | Noted: 2019-05-14

## 2019-05-14 NOTE — PROGRESS NOTES
This note was created by combination of typed  and Dragon dictation.  Transcription errors may be present.  If there are any questions, please contact me.    Assessment & Plan:   Chronic narcotic use  Chronic midline low back pain without sciatica; by report, 9/8/2011 MRI lumbar spine schmorl's nodes and ligamentous hypertrophy, root impingment noted  Primary osteoarthritis of both knees hx of injection  -stable.  Prescription refill today with postdated prescriptions as well.  -     HYDROcodone-acetaminophen (NORCO)  mg per tablet; Take 1 tablet by mouth every 24 hours as needed for Pain.  Dispense: 30 tablet; Refill: 0    Chronic obstructive pulmonary disease, unspecified COPD type  -stable    Essential hypertension  -high today.  Reports compliance on medication.  Monitor.  Has room to move on amlodipine if it is still high next visit.  -     amLODIPine (NORVASC) 2.5 MG tablet; Take 1 tablet (2.5 mg total) by mouth once daily.  Dispense: 90 tablet; Refill: 3  -     irbesartan (AVAPRO) 300 MG tablet; Take 1 tablet (300 mg total) by mouth every evening.  Dispense: 90 tablet; Refill: 3    Controlled type 2 diabetes mellitus without complication, without long-term current use of insulin  -check labs, on glipizide b.i.d. and metformin b.i.d.  [facility] and I will try to get old records.  -     glipiZIDE (GLUCOTROL) 5 MG tablet; Take 1 tablet (5 mg total) by mouth 2 (two) times daily before meals.  Dispense: 180 tablet; Refill: 3  -     Hemoglobin A1c; Future; Expected date: 05/15/2019  -     Comprehensive metabolic panel; Future; Expected date: 05/15/2019    Idiopathic chronic gout of multiple sites without tophus  -he is unsure if he is taking allopurinol 100 mg or 300 mg.  I wanted him on 100.  Check uric acid.  He will call back to verify the dose of his medications.  -     Uric acid; Future; Expected date: 05/15/2019      I will try to get outside colonoscopy records  from [facility].    Medications Discontinued During This Encounter   Medication Reason    amLODIPine (NORVASC) 2.5 MG tablet Reorder    HYDROcodone-acetaminophen (NORCO)  mg per tablet Reorder    glipiZIDE (GLUCOTROL) 5 MG tablet Reorder    irbesartan (AVAPRO) 300 MG tablet Reorder       meds sent this encounter:  Medications Ordered This Encounter   Medications    amLODIPine (NORVASC) 2.5 MG tablet     Sig: Take 1 tablet (2.5 mg total) by mouth once daily.     Dispense:  90 tablet     Refill:  3    glipiZIDE (GLUCOTROL) 5 MG tablet     Sig: Take 1 tablet (5 mg total) by mouth 2 (two) times daily before meals.     Dispense:  180 tablet     Refill:  3    HYDROcodone-acetaminophen (NORCO)  mg per tablet     Sig: Take 1 tablet by mouth every 24 hours as needed for Pain.     Dispense:  30 tablet     Refill:  0    HYDROcodone-acetaminophen (NORCO)  mg per tablet     Sig: Take 1 tablet by mouth every 24 hours as needed for Pain.     Dispense:  30 tablet     Refill:  0    HYDROcodone-acetaminophen (NORCO)  mg per tablet     Sig: Take 1 tablet by mouth every 24 hours as needed for Pain.     Dispense:  30 tablet     Refill:  0    irbesartan (AVAPRO) 300 MG tablet     Sig: Take 1 tablet (300 mg total) by mouth every evening.     Dispense:  90 tablet     Refill:  3       Follow Up: No follow-ups on file.  Follow-up 3 months    Subjective:     Chief Complaint   Patient presents with    Medication Refill       HPI  Paulino is a 63 y.o. male, last appointment with this clinic was 1/2/2019.    1/2019 Uric acid very low on allopurinol 300 would decrease to 100  a1c OK on current regimen no change.  Lipid low on zocor 1/2 tablet. No change  CBC mild normocytic anemia.  He recalls having had EGD and Colonoscopy at [facility], we're trying to get copies.     ER visit in early March for cough with resultant costochondritis.     4/17 with me  No aberrancy    Did not bring in rx's but thinks allopurinol is  300.  He will call back and verify.    Recalls having had EGD and colonoscopy at [facility] about a year or so ago.  Cannot recall the doctor's name.     Denies any issues.  No headaches, chest pain, shortness of breath.  Finds the medications helpful for pain, stable.    Blood pressure high today.  He reports compliance on medications.  I have asked him to verify the medications when he goes home.  His previous blood pressures have been normal.    [facility]    Patient Care Team:  Rogelio March MD as PCP - General (Internal Medicine)  Rogelio March MD as PCP - MSSP Attributed  Fermin Lee Jr., MD as Consulting Physician (Family Medicine)    Patient Active Problem List    Diagnosis Date Noted    GERD (gastroesophageal reflux disease) 05/14/2019    COPD (chronic obstructive pulmonary disease) with reported hx of abn PFTs 05/14/2019    Primary osteoarthritis of both knees hx of injection 05/14/2019    History of hepatitis C s/p treatment per pt.  [facility], [facility] liver 8/2018 08/29/2018    Idiopathic gout of multiple sites 11/24/2017    Chronic midline low back pain without sciatica; by report, 9/8/2011 MRI lumbar spine schmorl's nodes and ligamentous hypertrophy, root impingment noted 10/24/2017    Chronic narcotic use 10/24/2017    Essential hypertension 10/24/2017    Controlled type 2 diabetes mellitus with microalbuminuria, without long-term current use of insulin 10/24/2017    Coronary artery disease involving native coronary artery of native heart without angina pectoris 10/24/2017    Benign prostatic hyperplasia without lower urinary tract symptoms 10/24/2017    Smoker 10/24/2017       PAST MEDICAL HISTORY:  Past Medical History:   Diagnosis Date    Diabetes mellitus, type 2     Hyperlipidemia     Hypertension        PAST SURGICAL HISTORY:  History reviewed. No pertinent surgical history.    SOCIAL HISTORY:  Social History     Socioeconomic History    Marital  status: Single     Spouse name: Not on file    Number of children: Not on file    Years of education: Not on file    Highest education level: Not on file   Occupational History    Occupation: disabled - orthopedic   Social Needs    Financial resource strain: Not on file    Food insecurity:     Worry: Not on file     Inability: Not on file    Transportation needs:     Medical: Not on file     Non-medical: Not on file   Tobacco Use    Smoking status: Current Every Day Smoker     Packs/day: 0.10     Types: Cigarettes    Smokeless tobacco: Never Used   Substance and Sexual Activity    Alcohol use: Not on file    Drug use: No    Sexual activity: Yes   Lifestyle    Physical activity:     Days per week: Not on file     Minutes per session: Not on file    Stress: Not on file   Relationships    Social connections:     Talks on phone: Not on file     Gets together: Not on file     Attends Voodoo service: Not on file     Active member of club or organization: Not on file     Attends meetings of clubs or organizations: Not on file     Relationship status: Not on file   Other Topics Concern    Not on file   Social History Narrative    Not on file       ALLERGIES AND MEDICATIONS: updated and reviewed.  Review of patient's allergies indicates:   Allergen Reactions    Ace inhibitors      Current Outpatient Medications   Medication Sig Dispense Refill    allopurinol (ZYLOPRIM) 100 MG tablet Take 1 tablet (100 mg total) by mouth once daily. Decreased dose 90 tablet 3    amLODIPine (NORVASC) 2.5 MG tablet Take 1 tablet (2.5 mg total) by mouth once daily. 30 tablet 11    carvedilol (COREG) 12.5 MG tablet Take 1 tablet (12.5 mg total) by mouth 2 (two) times daily with meals. 180 tablet 3    clopidogrel (PLAVIX) 75 mg tablet Take 75 mg by mouth once daily.      finasteride (PROSCAR) 5 mg tablet Take 5 mg by mouth once daily.      HYDROcodone-acetaminophen (NORCO)  mg per tablet Take 1 tablet by mouth  "every 24 hours as needed for Pain. 30 tablet 0    irbesartan (AVAPRO) 300 MG tablet Take 1 tablet (300 mg total) by mouth every evening. 90 tablet 3    metFORMIN (GLUCOPHAGE) 500 MG tablet Take 500 mg by mouth 2 (two) times daily with meals.      omeprazole (PRILOSEC) 20 MG capsule Take 20 mg by mouth once daily.      simvastatin (ZOCOR) 40 MG tablet Take 0.5 tablets (20 mg total) by mouth every evening. 90 tablet 3    glipiZIDE (GLUCOTROL) 5 MG tablet Take 1 tablet (5 mg total) by mouth 2 (two) times daily before meals. 60 tablet 11     No current facility-administered medications for this visit.        Review of Systems   Constitutional: Negative for chills and fever.   Respiratory: Negative for cough and sputum production.    Cardiovascular: Negative for chest pain.   Neurological: Negative for headaches.       Objective:   Physical Exam   Vitals:    05/15/19 1050   BP: (!) 160/90   BP Location: Left arm   Patient Position: Sitting   BP Method: Medium (Manual)   Pulse: 71   Temp: 97.6 °F (36.4 °C)   TempSrc: Oral   SpO2: 96%   Weight: 85.7 kg (189 lb)   Height: 6' 2" (1.88 m)    Body mass index is 24.27 kg/m².  Weight: 85.7 kg (189 lb)   Height: 6' 2" (188 cm)     Physical Exam   Constitutional: He is oriented to person, place, and time. He appears well-developed and well-nourished. No distress.   Eyes: EOM are normal.   Cardiovascular: Normal rate, regular rhythm and normal heart sounds.   No murmur heard.  Pulses:       Dorsalis pedis pulses are 3+ on the right side, and 3+ on the left side.        Posterior tibial pulses are 3+ on the right side, and 3+ on the left side.   Pulmonary/Chest: Effort normal and breath sounds normal.   Musculoskeletal: Normal range of motion. He exhibits no edema.        Right foot: There is no deformity.        Left foot: There is no deformity.   Feet:   Right Foot:   Protective Sensation: 5 sites tested. 5 sites sensed.   Skin Integrity: Negative for ulcer, blister, skin " breakdown, erythema or warmth.   Left Foot:   Protective Sensation: 5 sites tested. 5 sites sensed.   Skin Integrity: Negative for ulcer, blister, skin breakdown, erythema or warmth.   Neurological: He is alert and oriented to person, place, and time. Coordination normal.   Skin: Skin is warm and dry.   Psychiatric: He has a normal mood and affect. His behavior is normal. Thought content normal.

## 2019-05-15 ENCOUNTER — TELEPHONE (OUTPATIENT)
Dept: FAMILY MEDICINE | Facility: CLINIC | Age: 64
End: 2019-05-15

## 2019-05-15 ENCOUNTER — LAB VISIT (OUTPATIENT)
Dept: LAB | Facility: HOSPITAL | Age: 64
End: 2019-05-15
Attending: INTERNAL MEDICINE
Payer: MEDICARE

## 2019-05-15 ENCOUNTER — OFFICE VISIT (OUTPATIENT)
Dept: FAMILY MEDICINE | Facility: CLINIC | Age: 64
End: 2019-05-15
Payer: MEDICARE

## 2019-05-15 VITALS
HEART RATE: 71 BPM | HEIGHT: 74 IN | SYSTOLIC BLOOD PRESSURE: 160 MMHG | DIASTOLIC BLOOD PRESSURE: 90 MMHG | WEIGHT: 189 LBS | TEMPERATURE: 98 F | BODY MASS INDEX: 24.26 KG/M2 | OXYGEN SATURATION: 96 %

## 2019-05-15 DIAGNOSIS — F11.90 CHRONIC NARCOTIC USE: Primary | ICD-10-CM

## 2019-05-15 DIAGNOSIS — M54.50 CHRONIC MIDLINE LOW BACK PAIN WITHOUT SCIATICA: ICD-10-CM

## 2019-05-15 DIAGNOSIS — M1A.09X0 IDIOPATHIC CHRONIC GOUT OF MULTIPLE SITES WITHOUT TOPHUS: ICD-10-CM

## 2019-05-15 DIAGNOSIS — J44.9 CHRONIC OBSTRUCTIVE PULMONARY DISEASE, UNSPECIFIED COPD TYPE: ICD-10-CM

## 2019-05-15 DIAGNOSIS — E11.9 CONTROLLED TYPE 2 DIABETES MELLITUS WITHOUT COMPLICATION, WITHOUT LONG-TERM CURRENT USE OF INSULIN: ICD-10-CM

## 2019-05-15 DIAGNOSIS — I10 ESSENTIAL HYPERTENSION: ICD-10-CM

## 2019-05-15 DIAGNOSIS — G89.29 CHRONIC MIDLINE LOW BACK PAIN WITHOUT SCIATICA: ICD-10-CM

## 2019-05-15 DIAGNOSIS — M17.0 PRIMARY OSTEOARTHRITIS OF BOTH KNEES: ICD-10-CM

## 2019-05-15 LAB
ALBUMIN SERPL BCP-MCNC: 4 G/DL (ref 3.5–5.2)
ALP SERPL-CCNC: 127 U/L (ref 55–135)
ALT SERPL W/O P-5'-P-CCNC: 12 U/L (ref 10–44)
ANION GAP SERPL CALC-SCNC: 7 MMOL/L (ref 8–16)
AST SERPL-CCNC: 18 U/L (ref 10–40)
BILIRUB SERPL-MCNC: 0.5 MG/DL (ref 0.1–1)
BUN SERPL-MCNC: 9 MG/DL (ref 8–23)
CALCIUM SERPL-MCNC: 10.3 MG/DL (ref 8.7–10.5)
CHLORIDE SERPL-SCNC: 106 MMOL/L (ref 95–110)
CO2 SERPL-SCNC: 28 MMOL/L (ref 23–29)
CREAT SERPL-MCNC: 1.1 MG/DL (ref 0.5–1.4)
EST. GFR  (AFRICAN AMERICAN): >60 ML/MIN/1.73 M^2
EST. GFR  (NON AFRICAN AMERICAN): >60 ML/MIN/1.73 M^2
ESTIMATED AVG GLUCOSE: 166 MG/DL (ref 68–131)
GLUCOSE SERPL-MCNC: 117 MG/DL (ref 70–110)
HBA1C MFR BLD HPLC: 7.4 % (ref 4–5.6)
POTASSIUM SERPL-SCNC: 4.4 MMOL/L (ref 3.5–5.1)
PROT SERPL-MCNC: 7.6 G/DL (ref 6–8.4)
SODIUM SERPL-SCNC: 141 MMOL/L (ref 136–145)
URATE SERPL-MCNC: 3.2 MG/DL (ref 3.4–7)

## 2019-05-15 PROCEDURE — 99999 PR PBB SHADOW E&M-EST. PATIENT-LVL III: ICD-10-PCS | Mod: PBBFAC,,, | Performed by: INTERNAL MEDICINE

## 2019-05-15 PROCEDURE — 99213 OFFICE O/P EST LOW 20 MIN: CPT | Mod: PBBFAC,PO | Performed by: INTERNAL MEDICINE

## 2019-05-15 PROCEDURE — 36415 COLL VENOUS BLD VENIPUNCTURE: CPT | Mod: PO

## 2019-05-15 PROCEDURE — 83036 HEMOGLOBIN GLYCOSYLATED A1C: CPT

## 2019-05-15 PROCEDURE — 99214 OFFICE O/P EST MOD 30 MIN: CPT | Mod: S$PBB,,, | Performed by: INTERNAL MEDICINE

## 2019-05-15 PROCEDURE — 99999 PR PBB SHADOW E&M-EST. PATIENT-LVL III: CPT | Mod: PBBFAC,,, | Performed by: INTERNAL MEDICINE

## 2019-05-15 PROCEDURE — 80053 COMPREHEN METABOLIC PANEL: CPT

## 2019-05-15 PROCEDURE — 84550 ASSAY OF BLOOD/URIC ACID: CPT

## 2019-05-15 PROCEDURE — 99214 PR OFFICE/OUTPT VISIT, EST, LEVL IV, 30-39 MIN: ICD-10-PCS | Mod: S$PBB,,, | Performed by: INTERNAL MEDICINE

## 2019-05-15 RX ORDER — AMLODIPINE BESYLATE 2.5 MG/1
2.5 TABLET ORAL DAILY
Qty: 90 TABLET | Refills: 3 | Status: SHIPPED | OUTPATIENT
Start: 2019-05-15 | End: 2020-04-06

## 2019-05-15 RX ORDER — IRBESARTAN 300 MG/1
300 TABLET ORAL NIGHTLY
Qty: 90 TABLET | Refills: 3 | Status: SHIPPED | OUTPATIENT
Start: 2019-05-15 | End: 2020-04-06

## 2019-05-15 RX ORDER — HYDROCODONE BITARTRATE AND ACETAMINOPHEN 10; 325 MG/1; MG/1
1 TABLET ORAL
Qty: 30 TABLET | Refills: 0 | Status: SHIPPED | OUTPATIENT
Start: 2019-06-14 | End: 2019-07-14

## 2019-05-15 RX ORDER — GLIPIZIDE 5 MG/1
5 TABLET ORAL
Qty: 180 TABLET | Refills: 3 | Status: SHIPPED | OUTPATIENT
Start: 2019-05-15 | End: 2020-11-06 | Stop reason: ALTCHOICE

## 2019-05-15 RX ORDER — HYDROCODONE BITARTRATE AND ACETAMINOPHEN 10; 325 MG/1; MG/1
1 TABLET ORAL
Qty: 30 TABLET | Refills: 0 | Status: SHIPPED | OUTPATIENT
Start: 2019-07-14 | End: 2019-07-11 | Stop reason: SDUPTHER

## 2019-05-15 RX ORDER — HYDROCODONE BITARTRATE AND ACETAMINOPHEN 10; 325 MG/1; MG/1
1 TABLET ORAL
Qty: 30 TABLET | Refills: 0 | Status: SHIPPED | OUTPATIENT
Start: 2019-05-15 | End: 2019-08-01 | Stop reason: SDUPTHER

## 2019-05-15 NOTE — LETTER
May 15, 2019    Dept of [facility]  [address]  [place] [address]  Phone: [phone]  Fax: [phone] May 15, 2019     Patient: Paulino Meadows    YOB: 1955   Date of Visit: 5/15/2019       To Whom It May Concern:    We are seeing Paulino Meadows in the clinic at [facility].  Rogelio March MD is the patient's PCP.  She/He has an outstanding lab/procedure at the time we reviewed his/her chart.  To help with our Health Maintenance records will you please supply the following report(s)       (  )  MAMMOGRAM                                             (  )  COLONOSCOPY      (  )  PAP SMEAR                                                 (  )  OUTSIDE LAB RESULTS     (  )  DEXA SCAN                                                 ( x )  EYE EXAM            (  )  FOOT EXAM                                                 (  )  ENTIRE RECORD     (  )  OUTSIDE IMMUNIZATIONS                        (  )  _______________         Please Fax to [facility], [address], [phone]     If any questions please contact Jessica at [phone]

## 2019-05-15 NOTE — LETTER
May 15, 2019    Dept of [facility]  [address]  [place] [address]  Phone: [phone]  Fax: [phone] May 15, 2019     Patient: Paulino Meadows    YOB: 1955   Date of Visit: 5/15/2019     To Whom It May Concern:    We are seeing Paulino Meadows in the clinic at [facility].  Rogelio March MD is the patient's PCP.  She/He has an outstanding lab/procedure at the time we reviewed his/her chart.  To help with our Health Maintenance records will you please supply the following report(s)       (  )  MAMMOGRAM                                             (  )  COLONOSCOPY      (  )  PAP SMEAR                                                 (  )  OUTSIDE LAB RESULTS     (  )  DEXA SCAN                                                 ( x )  EYE EXAM            (  )  FOOT EXAM                                                 (  )  ENTIRE RECORD     (  )  OUTSIDE IMMUNIZATIONS                        (  )  _______________         Please Fax to [facility], [address], [phone]     If any questions please contact Jessica at [phone]

## 2019-05-15 NOTE — LETTER
May 15, 2019    [facility] Medical Records  [phone] fax             [address]  [place] [address]  Phone: [phone]  Fax: [phone] May 15, 2019     Patient: Paulino Meadows    YOB: 1955   Date of Visit: 5/15/2019     To Whom It May Concern:    We are seeing Paulino Meadows in the clinic at [facility].  Rogelio March MD is the patient's PCP.  She/He has an outstanding lab/procedure at the time we reviewed his/her chart.  To help with our Health Maintenance records will you please supply the following report(s)       (  )  MAMMOGRAM                                             ( x )  COLONOSCOPY      (  )  PAP SMEAR                                                 (  )  OUTSIDE LAB RESULTS     (  )  DEXA SCAN                                                 (  )  EYE EXAM            (  )  FOOT EXAM                                                 (  )  ENTIRE RECORD     (  )  OUTSIDE IMMUNIZATIONS                        (  )  _______________         Please Fax to [facility], [place] [address], [phone]     If any questions please contact Jessica at [phone]

## 2019-05-16 RX ORDER — METFORMIN HYDROCHLORIDE 500 MG/1
1000 TABLET ORAL 2 TIMES DAILY WITH MEALS
Qty: 360 TABLET | Refills: 3 | Status: SHIPPED | OUTPATIENT
Start: 2019-05-16 | End: 2020-11-06 | Stop reason: ALTCHOICE

## 2019-05-16 RX ORDER — ALLOPURINOL 100 MG/1
100 TABLET ORAL DAILY
Qty: 90 TABLET | Refills: 3 | Status: SHIPPED | OUTPATIENT
Start: 2019-05-16

## 2019-05-16 NOTE — TELEPHONE ENCOUNTER
His labs came back - his a1c is going up and his uric acid was low.    1. I believe he is taking allopurinol 300 mg and I wanted him on 100 mg.  I will re-send the prescription for allopurinol 100.    2. His a1c was high - he is taking metformin 500 mg twice a day - increase it to 1000 mg twice a day - I will send in new rx.

## 2019-05-16 NOTE — TELEPHONE ENCOUNTER
Spoke with patient about clarification on his medication. He could not go over the medication because he was not at home. The medication will further be discuss tomorrow morning with patient.

## 2019-05-16 NOTE — TELEPHONE ENCOUNTER
Spoke with patient he was not at home to go over his medications. Patient stated he will be home at 3 pm.

## 2019-05-22 ENCOUNTER — TELEPHONE (OUTPATIENT)
Dept: ADMINISTRATIVE | Facility: HOSPITAL | Age: 64
End: 2019-05-22

## 2019-06-14 ENCOUNTER — TELEPHONE (OUTPATIENT)
Dept: FAMILY MEDICINE | Facility: CLINIC | Age: 64
End: 2019-06-14

## 2019-06-14 DIAGNOSIS — F11.90 CHRONIC NARCOTIC USE: ICD-10-CM

## 2019-06-14 DIAGNOSIS — G89.29 CHRONIC MIDLINE LOW BACK PAIN WITHOUT SCIATICA: ICD-10-CM

## 2019-06-14 DIAGNOSIS — I10 ESSENTIAL HYPERTENSION: ICD-10-CM

## 2019-06-14 DIAGNOSIS — M54.50 CHRONIC MIDLINE LOW BACK PAIN WITHOUT SCIATICA: ICD-10-CM

## 2019-06-14 NOTE — TELEPHONE ENCOUNTER
----- Message from Skylar Cyr sent at 6/14/2019 11:28 AM CDT -----  Contact: pt  Name of Who is Calling:TRIPP CISNEROS [[id number]]    What is the request in detail: patient would like a call back regaring states he does not need any more diabetic medication  Please contact to further discuss and advise     Can the clinic reply by [facility]: No    What Number to Call Back if not in [facility]: [phone]

## 2019-06-14 NOTE — TELEPHONE ENCOUNTER
Spoke with patient states he no longer needs to get DM medication filled advised patient to call pharmacy to discontinue medication because he is getting it free.

## 2019-06-14 NOTE — TELEPHONE ENCOUNTER
----- Message from Melita Garcia sent at 6/14/2019  9:08 AM CDT -----  Contact: Self: [phone]  Type: RX Refill Request    Who Called: Self    Refill or New Rx:Refill    RX Name and Strength:  HYDROcodone-acetaminophen (NORCO)  mg per tablet  rbesartan (AVAPRO) 300 MG tablet    How is the patient currently taking it? (1XDay)    Is this a 30 day or 90 day RX:90 Day    Preferred Pharmacy with phone number:  [facility] - [place], [place] - [address] [place] [address]  Phone: [phone] Fax: [phone]      Local or Mail Order:Local    Ordering Provider:Dr. March    Would the patient rather a call back or a response via [facility]? Callback    Best Call Back Number:[phone]    Additional Information: N/A

## 2019-07-11 DIAGNOSIS — G89.29 CHRONIC MIDLINE LOW BACK PAIN WITHOUT SCIATICA: ICD-10-CM

## 2019-07-11 DIAGNOSIS — F11.90 CHRONIC NARCOTIC USE: ICD-10-CM

## 2019-07-11 DIAGNOSIS — M54.50 CHRONIC MIDLINE LOW BACK PAIN WITHOUT SCIATICA: ICD-10-CM

## 2019-07-11 RX ORDER — HYDROCODONE BITARTRATE AND ACETAMINOPHEN 10; 325 MG/1; MG/1
1 TABLET ORAL
Qty: 30 TABLET | Refills: 0 | Status: SHIPPED | OUTPATIENT
Start: 2019-07-11 | End: 2019-08-10

## 2019-07-11 RX ORDER — HYDROCODONE BITARTRATE AND ACETAMINOPHEN 10; 325 MG/1; MG/1
1 TABLET ORAL
Qty: 30 TABLET | Refills: 0 | Status: SHIPPED | OUTPATIENT
Start: 2019-07-11 | End: 2019-07-11 | Stop reason: SDUPTHER

## 2019-07-11 NOTE — TELEPHONE ENCOUNTER
Spoke with patient has concerns about getting rx filled sooner. Patient advised that pharmacy would be contacted. Contacted pharmacy rep who states a new rx needs to be sent in order to be filled before the original fill date.

## 2019-07-11 NOTE — TELEPHONE ENCOUNTER
Unfortunately due to computer error I cannot e-prescribe any controlled substance.  Can he come in to  the written rx?

## 2019-07-11 NOTE — TELEPHONE ENCOUNTER
----- Message from Bebe Clark sent at 7/11/2019  9:09 AM CDT -----  Contact: self  [phone]  .Type: Patient Call Back    Who called: self     What is the request in detail: PT is requesting early refills on HYDROcodone-acetaminophen (NORCO)  mg per tablet since the storm is coming.   .  [facility] - [place], [place] - [address] [place] [address]  Phone: [phone] Fax: [phone]    Can the clinic reply by [facility]? Call back     Would the patient rather a call back or a response via [facility]?  Call back     Best call back number: [phone]

## 2019-08-01 DIAGNOSIS — F11.90 CHRONIC NARCOTIC USE: ICD-10-CM

## 2019-08-01 DIAGNOSIS — M54.50 CHRONIC MIDLINE LOW BACK PAIN WITHOUT SCIATICA: ICD-10-CM

## 2019-08-01 DIAGNOSIS — G89.29 CHRONIC MIDLINE LOW BACK PAIN WITHOUT SCIATICA: ICD-10-CM

## 2019-08-01 RX ORDER — HYDROCODONE BITARTRATE AND ACETAMINOPHEN 10; 325 MG/1; MG/1
1 TABLET ORAL
Qty: 30 TABLET | Refills: 0 | Status: SHIPPED | OUTPATIENT
Start: 2019-08-01 | End: 2019-09-05 | Stop reason: SDUPTHER

## 2019-08-01 NOTE — TELEPHONE ENCOUNTER
----- Message from Miranda Lee sent at 8/1/2019  2:33 PM CDT -----  Contact: Self   Type: RX Refill Request    Who Called:  Self     Refill or New Rx: refill     RX Name and Strength:HYDROcodone-acetaminophen (NORCO)  mg per tablet    Preferred Pharmacy with phone number: [facility] - [place], [place] - [address] [phone] (Phone)  [phone] (Fax)        Local or Mail Order: Local     Ordering Provider: Dr. March     Would the patient rather a call back or a response via [facility]?  Call     Best Call Back Number: [phone]    Additional Information:  Patient says his medication is not due until the 11th but he called early to make sure it gets done in time because he will need it because he has a lot of dental work to be done this month.

## 2019-09-05 ENCOUNTER — TELEPHONE (OUTPATIENT)
Dept: FAMILY MEDICINE | Facility: CLINIC | Age: 64
End: 2019-09-05

## 2019-09-05 DIAGNOSIS — G89.29 CHRONIC MIDLINE LOW BACK PAIN WITHOUT SCIATICA: ICD-10-CM

## 2019-09-05 DIAGNOSIS — M54.50 CHRONIC MIDLINE LOW BACK PAIN WITHOUT SCIATICA: ICD-10-CM

## 2019-09-05 DIAGNOSIS — F11.90 CHRONIC NARCOTIC USE: ICD-10-CM

## 2019-09-05 RX ORDER — HYDROCODONE BITARTRATE AND ACETAMINOPHEN 10; 325 MG/1; MG/1
1 TABLET ORAL
Qty: 30 TABLET | Refills: 0 | Status: SHIPPED | OUTPATIENT
Start: 2019-09-05 | End: 2019-09-09 | Stop reason: SDUPTHER

## 2019-09-05 NOTE — TELEPHONE ENCOUNTER
----- Message from Miranda Lee sent at 9/5/2019  8:32 AM CDT -----  Contact: Self   Type: Patient Call Back    Who called: Self     What is the request in detail:patient states he had Dental work done but his doctor refused to give him pain medication because he get it from Dr. March     Can the clinic reply by [facility]? No     Would the patient rather a call back or a response via [facility]?  Call       Best call back number:[phone]    HYDROcodone-acetaminophen (NORCO)  mg per tablet    [facility] - [place], [place] - [address] [phone] (Phone)  [phone] (Fax)

## 2019-09-06 NOTE — PROGRESS NOTES
This note was created by combination of typed  and Dragon dictation.  Transcription errors may be present.  If there are any questions, please contact me.    Assessment & Plan:   Chronic midline low back pain without sciatica; by report, 9/8/2011 MRI lumbar spine schmorl's nodes and ligamentous hypertrophy, root impingment noted  Chronic narcotic use  -stable on current regimen,  queried, no aberrancy.  Postdated prescriptions provided.  -     HYDROcodone-acetaminophen (NORCO)  mg per tablet; Take 1 tablet by mouth every 24 hours as needed for Pain.  Dispense: 30 tablet; Refill: 0  -     HYDROcodone-acetaminophen (NORCO)  mg per tablet; Take 1 tablet by mouth every 24 hours as needed for Pain.  Dispense: 30 tablet; Refill: 0  -     HYDROcodone-acetaminophen (NORCO)  mg per tablet; Take 1 tablet by mouth every 24 hours as needed for Pain.  Dispense: 30 tablet; Refill: 0    Controlled type 2 diabetes mellitus without complication, without long-term current use of insulin  -check A1c today.  On glipizide and metformin.  On statin on ARB  -     Hemoglobin A1c; Future; Expected date: 09/09/2019    Needs flu shot  -     Influenza - Quadrivalent (3 years & older) (PF)    Anemia, unspecified type  -on last labs in January, no previous for comparison.  Repeat CBC, check ferritin.  Denies obvious gross blood loss.  If iron deficiency, would repeat EGD and colonoscopy.    Our staff to try and get results of previous [place].  [facility]     Medications Discontinued During This Encounter   Medication Reason    HYDROcodone-acetaminophen (NORCO)  mg per tablet Reorder       meds sent this encounter:  Medications Ordered This Encounter   Medications    HYDROcodone-acetaminophen (NORCO)  mg per tablet     Sig: Take 1 tablet by mouth every 24 hours as needed for Pain.     Dispense:  30 tablet     Refill:  0     Quantity prescribed more than 7 day  supply? Yes, quantity medically necessary    HYDROcodone-acetaminophen (NORCO)  mg per tablet     Sig: Take 1 tablet by mouth every 24 hours as needed for Pain.     Dispense:  30 tablet     Refill:  0     Quantity prescribed more than 7 day supply? Yes, quantity medically necessary    HYDROcodone-acetaminophen (NORCO)  mg per tablet     Sig: Take 1 tablet by mouth every 24 hours as needed for Pain.     Dispense:  30 tablet     Refill:  0     Quantity prescribed more than 7 day supply? Yes, quantity medically necessary       Follow Up: No follow-ups on file. OV 3 months f/u    Subjective:     Chief Complaint   Patient presents with    Medication Refill       HPI  Paulino is a 63 y.o. male, last appointment with this clinic was 5/15/2019.    Last visit with me in mid May.  Hypertension at that time was elevated previously was normal.  Plan was to monitor and increase amlodipine if still high.  Diabetes he reports [facility].  Gout, allopurinol, should be on 100.  ER visit to [facility] in mid July for tooth infection.    a1c 7.4  Uric acid very low.    Never rec'd colonoscopy report from [facility]. Pt states he had it done 2-3 years ago as baseline, normal. Done at [facility] he recalls.    HTN, DM, lipid. Notes compliance with all meds.  Taking regularly. BP good - last visit was high.  Periodic outside readings with own cuff. BP good today.    Had some recent tooth work - broken wisdom tooth apparently broke during removal and necessitated more invasive surgery.     Needs future refills on hydrocodone. Denies issues. Taking regularly.    No chronic NSAIDS; no regular EtOH    Diabetes Management Status    Statin: Taking  ACE/ARB: Taking    Screening or Prevention Patient's value Goal Complete/Controlled?   HgA1C Testing and Control   Lab Results   Component Value Date    HGBA1C 7.4 (H) 05/15/2019      Annually/Less than 8% Yes   Lipid profile : 01/02/2019 Annually Yes   LDL  control Lab Results   Component Value Date    LDLCALC 52.2 (L) 01/02/2019    Annually/Less than 100 mg/dl  Yes   Nephropathy screening Lab Results   Component Value Date    LABMICR 187.0 04/23/2018     No results found for: PROTEINUA Annually No   Blood pressure BP Readings from Last 1 Encounters:   09/09/19 104/60    Less than 140/90 Yes   Dilated retinal exam : 11/23/2016 Annually Yes   Foot exam   : 05/15/2019 Annually Yes         Patient Care Team:  Rogelio March MD as PCP - General (Internal Medicine)  Rogelio March MD as PCP - MSS[facility] Attributed  Fermin Lee Jr., MD as Consulting Physician (Family Medicine)    Patient Active Problem List    Diagnosis Date Noted    GERD (gastroesophageal reflux disease) 05/14/2019    COPD (chronic obstructive pulmonary disease) with reported hx of abn PFTs 05/14/2019    Primary osteoarthritis of both knees hx of injection 05/14/2019    History of hepatitis C s/p treatment per pt.  [facility], [facility] liver 8/2018 08/29/2018    Idiopathic gout of multiple sites 11/24/2017    Chronic midline low back pain without sciatica; by report, 9/8/2011 MRI lumbar spine schmorl's nodes and ligamentous hypertrophy, root impingment noted 10/24/2017    Chronic narcotic use 10/24/2017    Essential hypertension 10/24/2017    Controlled type 2 diabetes mellitus with microalbuminuria, without long-term current use of insulin 10/24/2017    Coronary artery disease involving native coronary artery of native heart without angina pectoris 10/24/2017    Benign prostatic hyperplasia without lower urinary tract symptoms 10/24/2017    Smoker 10/24/2017       PAST MEDICAL HISTORY:  Past Medical History:   Diagnosis Date    Diabetes mellitus, type 2     Hyperlipidemia     Hypertension        PAST SURGICAL HISTORY:  History reviewed. No pertinent surgical history.    SOCIAL HISTORY:  Social History     Socioeconomic History    Marital status: Single     Spouse name:  Not on file    Number of children: Not on file    Years of education: Not on file    Highest education level: Not on file   Occupational History    Occupation: disabled - orthopedic   Social Needs    Financial resource strain: Not on file    Food insecurity:     Worry: Not on file     Inability: Not on file    Transportation needs:     Medical: Not on file     Non-medical: Not on file   Tobacco Use    Smoking status: Current Every Day Smoker     Packs/day: 0.10     Types: Cigarettes    Smokeless tobacco: Never Used   Substance and Sexual Activity    Alcohol use: Not on file    Drug use: No    Sexual activity: Yes   Lifestyle    Physical activity:     Days per week: Not on file     Minutes per session: Not on file    Stress: Not at all   Relationships    Social connections:     Talks on phone: Not on file     Gets together: Not on file     Attends Rastafarian service: Not on file     Active member of club or organization: Not on file     Attends meetings of clubs or organizations: Not on file     Relationship status: Not on file   Other Topics Concern    Not on file   Social History Narrative    Not on file       ALLERGIES AND MEDICATIONS: updated and reviewed.  Review of patient's allergies indicates:   Allergen Reactions    Ace inhibitors      Current Outpatient Medications   Medication Sig Dispense Refill    allopurinol (ZYLOPRIM) 100 MG tablet Take 1 tablet (100 mg total) by mouth once daily. Decreased dose 90 tablet 3    amLODIPine (NORVASC) 2.5 MG tablet Take 1 tablet (2.5 mg total) by mouth once daily. 90 tablet 3    carvedilol (COREG) 12.5 MG tablet Take 1 tablet (12.5 mg total) by mouth 2 (two) times daily with meals. 180 tablet 3    clopidogrel (PLAVIX) 75 mg tablet Take 75 mg by mouth once daily.      finasteride (PROSCAR) 5 mg tablet Take 5 mg by mouth once daily.      glipiZIDE (GLUCOTROL) 5 MG tablet Take 1 tablet (5 mg total) by mouth 2 (two) times daily before meals. 180 tablet 3  "   HYDROcodone-acetaminophen (NORCO)  mg per tablet Take 1 tablet by mouth every 24 hours as needed for Pain. 30 tablet 0    irbesartan (AVAPRO) 300 MG tablet Take 1 tablet (300 mg total) by mouth every evening. 90 tablet 3    metFORMIN (GLUCOPHAGE) 500 MG tablet Take 2 tablets (1,000 mg total) by mouth 2 (two) times daily with meals. Increased dose 360 tablet 3    omeprazole (PRILOSEC) 20 MG capsule Take 20 mg by mouth once daily.      simvastatin (ZOCOR) 40 MG tablet Take 0.5 tablets (20 mg total) by mouth every evening. 90 tablet 3     No current facility-administered medications for this visit.        Review of Systems   Constitutional: Negative for fever, malaise/fatigue and weight loss.   HENT: Negative for congestion.    Eyes: Negative for blurred vision and pain.   Respiratory: Negative for shortness of breath and wheezing.    Cardiovascular: Negative for chest pain, palpitations and leg swelling.   Gastrointestinal: Negative for abdominal pain, blood in stool, constipation, diarrhea and heartburn.   Genitourinary: Negative for dysuria, hematuria and urgency.   Neurological: Negative for tingling and headaches.       Objective:   Physical Exam  Vitals:    09/09/19 1253   BP: 104/60   BP Location: Right arm   Patient Position: Sitting   BP Method: Medium (Manual)   Pulse: 73   Temp: 98 °F (36.7 °C)   TempSrc: Oral   SpO2: 95%   Weight: 85.6 kg (188 lb 9.7 oz)   Height: 6' 2" (1.88 m)    Body mass index is 24.22 kg/m².  Weight: 85.6 kg (188 lb 9.7 oz)   Height: 6' 2" (188 cm)     Physical Exam   Constitutional: He is oriented to person, place, and time. He appears well-developed and well-nourished. No distress.   Eyes: EOM are normal.   Cardiovascular: Normal rate, regular rhythm and normal heart sounds.   No murmur heard.  Pulmonary/Chest: Effort normal and breath sounds normal.   Musculoskeletal: Normal range of motion. He exhibits no edema.   Neurological: He is alert and oriented to person, " place, and time. Coordination normal.   Skin: Skin is warm and dry.   Psychiatric: He has a normal mood and affect. His behavior is normal. Thought content normal.

## 2019-09-09 ENCOUNTER — OFFICE VISIT (OUTPATIENT)
Dept: FAMILY MEDICINE | Facility: CLINIC | Age: 64
End: 2019-09-09
Payer: MEDICARE

## 2019-09-09 ENCOUNTER — LAB VISIT (OUTPATIENT)
Dept: LAB | Facility: HOSPITAL | Age: 64
End: 2019-09-09
Attending: INTERNAL MEDICINE
Payer: MEDICARE

## 2019-09-09 VITALS
HEIGHT: 74 IN | TEMPERATURE: 98 F | SYSTOLIC BLOOD PRESSURE: 104 MMHG | DIASTOLIC BLOOD PRESSURE: 60 MMHG | WEIGHT: 188.63 LBS | HEART RATE: 73 BPM | OXYGEN SATURATION: 95 % | BODY MASS INDEX: 24.21 KG/M2

## 2019-09-09 DIAGNOSIS — E11.9 CONTROLLED TYPE 2 DIABETES MELLITUS WITHOUT COMPLICATION, WITHOUT LONG-TERM CURRENT USE OF INSULIN: ICD-10-CM

## 2019-09-09 DIAGNOSIS — D64.9 NORMOCYTIC ANEMIA: ICD-10-CM

## 2019-09-09 DIAGNOSIS — D64.9 ANEMIA, UNSPECIFIED TYPE: ICD-10-CM

## 2019-09-09 DIAGNOSIS — G89.29 CHRONIC MIDLINE LOW BACK PAIN WITHOUT SCIATICA: Primary | ICD-10-CM

## 2019-09-09 DIAGNOSIS — F11.90 CHRONIC NARCOTIC USE: ICD-10-CM

## 2019-09-09 DIAGNOSIS — M54.50 CHRONIC MIDLINE LOW BACK PAIN WITHOUT SCIATICA: Primary | ICD-10-CM

## 2019-09-09 DIAGNOSIS — Z23 NEEDS FLU SHOT: ICD-10-CM

## 2019-09-09 LAB
BASOPHILS # BLD AUTO: 0.03 K/UL (ref 0–0.2)
BASOPHILS NFR BLD: 0.4 % (ref 0–1.9)
DIFFERENTIAL METHOD: ABNORMAL
EOSINOPHIL # BLD AUTO: 0.2 K/UL (ref 0–0.5)
EOSINOPHIL NFR BLD: 2.1 % (ref 0–8)
ERYTHROCYTE [DISTWIDTH] IN BLOOD BY AUTOMATED COUNT: 14.3 % (ref 11.5–14.5)
ESTIMATED AVG GLUCOSE: 154 MG/DL (ref 68–131)
HBA1C MFR BLD HPLC: 7 % (ref 4–5.6)
HCT VFR BLD AUTO: 39.2 % (ref 40–54)
HGB BLD-MCNC: 12.1 G/DL (ref 14–18)
IMM GRANULOCYTES # BLD AUTO: 0.02 K/UL (ref 0–0.04)
IMM GRANULOCYTES NFR BLD AUTO: 0.2 % (ref 0–0.5)
LYMPHOCYTES # BLD AUTO: 3 K/UL (ref 1–4.8)
LYMPHOCYTES NFR BLD: 35.5 % (ref 18–48)
MCH RBC QN AUTO: 30.6 PG (ref 27–31)
MCHC RBC AUTO-ENTMCNC: 30.9 G/DL (ref 32–36)
MCV RBC AUTO: 99 FL (ref 82–98)
MONOCYTES # BLD AUTO: 0.7 K/UL (ref 0.3–1)
MONOCYTES NFR BLD: 8.6 % (ref 4–15)
NEUTROPHILS # BLD AUTO: 4.5 K/UL (ref 1.8–7.7)
NEUTROPHILS NFR BLD: 53.2 % (ref 38–73)
NRBC BLD-RTO: 0 /100 WBC
PLATELET # BLD AUTO: 235 K/UL (ref 150–350)
PMV BLD AUTO: 10.9 FL (ref 9.2–12.9)
RBC # BLD AUTO: 3.95 M/UL (ref 4.6–6.2)
WBC # BLD AUTO: 8.42 K/UL (ref 3.9–12.7)

## 2019-09-09 PROCEDURE — 85025 COMPLETE CBC W/AUTO DIFF WBC: CPT

## 2019-09-09 PROCEDURE — 99214 OFFICE O/P EST MOD 30 MIN: CPT | Mod: S$PBB,,, | Performed by: INTERNAL MEDICINE

## 2019-09-09 PROCEDURE — 90686 IIV4 VACC NO PRSV 0.5 ML IM: CPT | Mod: PBBFAC,PO

## 2019-09-09 PROCEDURE — 82728 ASSAY OF FERRITIN: CPT

## 2019-09-09 PROCEDURE — 99999 PR PBB SHADOW E&M-EST. PATIENT-LVL III: ICD-10-PCS | Mod: PBBFAC,,, | Performed by: INTERNAL MEDICINE

## 2019-09-09 PROCEDURE — 99214 PR OFFICE/OUTPT VISIT, EST, LEVL IV, 30-39 MIN: ICD-10-PCS | Mod: S$PBB,,, | Performed by: INTERNAL MEDICINE

## 2019-09-09 PROCEDURE — 83036 HEMOGLOBIN GLYCOSYLATED A1C: CPT

## 2019-09-09 PROCEDURE — 99213 OFFICE O/P EST LOW 20 MIN: CPT | Mod: PBBFAC,PO,25 | Performed by: INTERNAL MEDICINE

## 2019-09-09 PROCEDURE — 83540 ASSAY OF IRON: CPT

## 2019-09-09 PROCEDURE — 36415 COLL VENOUS BLD VENIPUNCTURE: CPT | Mod: PO

## 2019-09-09 PROCEDURE — 99999 PR PBB SHADOW E&M-EST. PATIENT-LVL III: CPT | Mod: PBBFAC,,, | Performed by: INTERNAL MEDICINE

## 2019-09-09 RX ORDER — HYDROCODONE BITARTRATE AND ACETAMINOPHEN 10; 325 MG/1; MG/1
1 TABLET ORAL
Qty: 30 TABLET | Refills: 0 | Status: SHIPPED | OUTPATIENT
Start: 2019-12-04 | End: 2019-12-17 | Stop reason: SDUPTHER

## 2019-09-09 RX ORDER — HYDROCODONE BITARTRATE AND ACETAMINOPHEN 10; 325 MG/1; MG/1
1 TABLET ORAL
Qty: 30 TABLET | Refills: 0 | Status: SHIPPED | OUTPATIENT
Start: 2019-11-04 | End: 2019-12-04

## 2019-09-09 RX ORDER — HYDROCODONE BITARTRATE AND ACETAMINOPHEN 10; 325 MG/1; MG/1
1 TABLET ORAL
Qty: 30 TABLET | Refills: 0 | Status: SHIPPED | OUTPATIENT
Start: 2019-10-05 | End: 2019-12-17 | Stop reason: SDUPTHER

## 2019-09-10 ENCOUNTER — TELEPHONE (OUTPATIENT)
Dept: ADMINISTRATIVE | Facility: HOSPITAL | Age: 64
End: 2019-09-10

## 2019-09-10 LAB
FERRITIN SERPL-MCNC: 160 NG/ML (ref 20–300)
IRON SERPL-MCNC: 32 UG/DL (ref 45–160)
SATURATED IRON: 11 % (ref 20–50)
TOTAL IRON BINDING CAPACITY: 297 UG/DL (ref 250–450)
TRANSFERRIN SERPL-MCNC: 201 MG/DL (ref 200–375)

## 2019-09-11 PROBLEM — D63.8 ANEMIA OF CHRONIC DISEASE: Status: ACTIVE | Noted: 2019-09-11

## 2019-09-11 NOTE — PROGRESS NOTES
A1c good, 7.0 from previous 7.4.  CBC mild anemia seen previously.  Ferritin elevated, iron stores low.  More consistent with anemia of chronic disease  Results to pt. No further testing.VANESSA sent for colonoscopy at [facility].

## 2019-09-12 ENCOUNTER — TELEPHONE (OUTPATIENT)
Dept: ADMINISTRATIVE | Facility: HOSPITAL | Age: 64
End: 2019-09-12

## 2019-12-17 NOTE — PROGRESS NOTES
This note was created by combination of typed  and M-Modal dictation.  Transcription errors may be present.  If there are any questions, please contact me.    Assessment & Plan:   Chronic narcotic use  Chronic midline low back pain without sciatica; by report, 9/8/2011 MRI lumbar spine schmorl's nodes and ligamentous hypertrophy, root impingment noted  -stable on current regimen, daily use.   Discussed with pt risks of chronic narcotic therapy including hyperalgesia, allodynia, and possible SE of depression, ED/libido, constipation  Would not increase the dose beyond what he is taking (he is not requesting any changes but discussed b/c of high risk nature of med, would limit the dose).   queried no aberrancy  Intensive monitoring, high risk medication.  -     HYDROcodone-acetaminophen (NORCO)  mg per tablet; Take 1 tablet by mouth every 24 hours as needed for Pain.  Dispense: 30 tablet; Refill: 0  -     HYDROcodone-acetaminophen (NORCO)  mg per tablet; Take 1 tablet by mouth every 24 hours as needed for Pain.  Dispense: 30 tablet; Refill: 0  -     HYDROcodone-acetaminophen (NORCO)  mg per tablet; Take 1 tablet by mouth every 24 hours as needed for Pain.  Dispense: 30 tablet; Refill: 0    Essential hypertension  -stable    Controlled type 2 diabetes mellitus with microalbuminuria, without long-term current use of insulin  -[facility]  [facility]  Future labs ordered for 6 months.  Our staff to [facility]  -     Comprehensive metabolic panel; Future; Expected date: 06/15/2020  -     Lipid panel; Future; Expected date: 06/15/2020  -     Hemoglobin A1c; Future; Expected date: 06/15/2020  -     Microalbumin/creatinine urine ratio; Future; Expected date: 06/15/2020  -     CBC auto differential; Future; Expected date: 06/15/2020      Medications Discontinued During This Encounter   Medication Reason    HYDROcodone-acetaminophen  (NORCO)  mg per tablet Reorder    HYDROcodone-acetaminophen (NORCO)  mg per tablet Reorder       meds sent this encounter:  Medications Ordered This Encounter   Medications    HYDROcodone-acetaminophen (NORCO)  mg per tablet     Sig: Take 1 tablet by mouth every 24 hours as needed for Pain.     Dispense:  30 tablet     Refill:  0     Quantity prescribed more than 7 day supply? Yes, quantity medically necessary    HYDROcodone-acetaminophen (NORCO)  mg per tablet     Sig: Take 1 tablet by mouth every 24 hours as needed for Pain.     Dispense:  30 tablet     Refill:  0     Quantity prescribed more than 7 day supply? Yes, quantity medically necessary    HYDROcodone-acetaminophen (NORCO)  mg per tablet     Sig: Take 1 tablet by mouth every 24 hours as needed for Pain.     Dispense:  30 tablet     Refill:  0     Quantity prescribed more than 7 day supply? Yes, quantity medically necessary       Follow Up: No follow-ups on file. 6 months, previsit labs ordered.    Subjective:     Chief Complaint   Patient presents with    Medication Refill       HPI  Paulino is a 63 y.o. male, last appointment with this clinic was 9/9/2019.    Last seen 9/2019  a1c at that time good 7.0 from 7.4  Mild anemia seen previously. Ferritin elevated, AoCD  I sent eROI for c scope done at [facility].    Chronic narcoic therapy stable.    Needs [facility] exam. [facility]    [facility]ist - when he goes for narcotic refill, she wants to refill his DM medications. [facility].     [facility]    Diabetes Management Status  Statin: Taking  ACE/ARB: Taking    Screening or Prevention Patient's value Goal Complete/Controlled?   HgA1C Testing and Control   Lab Results   Component Value Date    HGBA1C 7.0 (H) 09/09/2019      Annually/Less than 8% Yes   Lipid profile : 01/02/2019 Annually Yes   LDL control Lab Results   Component Value Date    LDLCALC 52.2 (L) 01/02/2019     Annually/Less than 100 mg/dl  Yes   Nephropathy screening Lab Results   Component Value Date    LABMICR 187.0 04/23/2018     No results found for: PROTEINUA Annually No   Blood pressure BP Readings from Last 1 Encounters:   12/18/19 122/80    Less than 140/90 Yes   Dilated retinal exam : 11/23/2016 Annually Yes   Foot exam   : 05/15/2019 Annually Yes         Patient Care Team:  Rogelio March MD as PCP - General (Internal Medicine)  Rogelio March MD as PCP - MSSP Attributed  Fermin Lee Jr., MD as Consulting Physician (Family Medicine)  Lashon Garcia MA as Care Coordinator    Patient Active Problem List    Diagnosis Date Noted    Anemia of chronic disease on labs 9/2019 09/11/2019    GERD (gastroesophageal reflux disease) 05/14/2019    COPD (chronic obstructive pulmonary disease) with reported hx of abn PFTs 05/14/2019    Primary osteoarthritis of both knees hx of injection 05/14/2019    History of hepatitis C s/p treatment per pt.  [facility], reported MRI liver 8/2018 08/29/2018    Idiopathic gout of multiple sites 11/24/2017    Chronic midline low back pain without sciatica; by report, 9/8/2011 MRI lumbar spine schmorl's nodes and ligamentous hypertrophy, root impingment noted 10/24/2017    Chronic narcotic use 10/24/2017    Essential hypertension 10/24/2017    Controlled type 2 diabetes mellitus with microalbuminuria, without long-term current use of insulin 10/24/2017    Coronary artery disease involving native coronary artery of native heart without angina pectoris 10/24/2017    Benign prostatic hyperplasia without lower urinary tract symptoms 10/24/2017    Smoker 10/24/2017       PAST MEDICAL HISTORY:  Past Medical History:   Diagnosis Date    Diabetes mellitus, type 2     Hyperlipidemia     Hypertension        PAST SURGICAL HISTORY:  History reviewed. No pertinent surgical history.    SOCIAL HISTORY:  Social History     Socioeconomic History    Marital status:  Single     Spouse name: Not on file    Number of children: Not on file    Years of education: Not on file    Highest education level: Not on file   Occupational History    Occupation: disabled - orthopedic   Social Needs    Financial resource strain: Not on file    Food insecurity:     Worry: Not on file     Inability: Not on file    Transportation needs:     Medical: Not on file     Non-medical: Not on file   Tobacco Use    Smoking status: Current Every Day Smoker     Packs/day: 0.10     Types: Cigarettes    Smokeless tobacco: Never Used   Substance and Sexual Activity    Alcohol use: Not on file    Drug use: No    Sexual activity: Yes   Lifestyle    Physical activity:     Days per week: Not on file     Minutes per session: Not on file    Stress: Not at all   Relationships    Social connections:     Talks on phone: Not on file     Gets together: Not on file     Attends Religion service: Not on file     Active member of club or organization: Not on file     Attends meetings of clubs or organizations: Not on file     Relationship status: Not on file   Other Topics Concern    Not on file   Social History Narrative    Not on file       ALLERGIES AND MEDICATIONS: updated and reviewed.  Review of patient's allergies indicates:   Allergen Reactions    Ace inhibitors      Current Outpatient Medications   Medication Sig Dispense Refill    allopurinol (ZYLOPRIM) 100 MG tablet Take 1 tablet (100 mg total) by mouth once daily. Decreased dose 90 tablet 3    amLODIPine (NORVASC) 2.5 MG tablet Take 1 tablet (2.5 mg total) by mouth once daily. 90 tablet 3    carvedilol (COREG) 12.5 MG tablet Take 1 tablet (12.5 mg total) by mouth 2 (two) times daily with meals. 180 tablet 3    clopidogrel (PLAVIX) 75 mg tablet Take 75 mg by mouth once daily.      finasteride (PROSCAR) 5 mg tablet Take 5 mg by mouth once daily.      fluticasone propionate (FLONASE) 50 mcg/actuation nasal spray       glipiZIDE (GLUCOTROL) 5  "MG tablet Take 1 tablet (5 mg total) by mouth 2 (two) times daily before meals. 180 tablet 3    HYDROcodone-acetaminophen (NORCO)  mg per tablet Take 1 tablet by mouth every 24 hours as needed for Pain. 30 tablet 0    HYDROcodone-acetaminophen (NORCO)  mg per tablet Take 1 tablet by mouth every 24 hours as needed for Pain. 30 tablet 0    irbesartan (AVAPRO) 300 MG tablet Take 1 tablet (300 mg total) by mouth every evening. 90 tablet 3    metFORMIN (GLUCOPHAGE) 500 MG tablet Take 2 tablets (1,000 mg total) by mouth 2 (two) times daily with meals. Increased dose 360 tablet 3    omeprazole (PRILOSEC) 20 MG capsule Take 20 mg by mouth once daily.      simvastatin (ZOCOR) 40 MG tablet Take 0.5 tablets (20 mg total) by mouth every evening. 90 tablet 3    SYMBICORT 160-4.5 mcg/actuation HFAA       VENTOLIN HFA 90 mcg/actuation inhaler        No current facility-administered medications for this visit.        Review of Systems   Constitutional: Negative for fever, malaise/fatigue and weight loss.   HENT: Negative for congestion.    Eyes: Negative for blurred vision and pain.   Respiratory: Negative for shortness of breath and wheezing.    Cardiovascular: Negative for chest pain, palpitations and leg swelling.   Gastrointestinal: Negative for abdominal pain.   Genitourinary: Negative for dysuria.   Neurological: Negative for tingling and headaches.       Objective:   Physical Exam   Vitals:    12/18/19 0900   BP: 122/80   BP Location: Left arm   Patient Position: Sitting   BP Method: Medium (Manual)   Pulse: 76   Temp: 97.7 °F (36.5 °C)   TempSrc: Oral   SpO2: 96%   Weight: 86.6 kg (191 lb)   Height: 6' 2" (1.88 m)    Body mass index is 24.52 kg/m².  Weight: 86.6 kg (191 lb)   Height: 6' 2" (188 cm)     Physical Exam   Constitutional: He is oriented to person, place, and time. He appears well-developed and well-nourished. No distress.   Eyes: EOM are normal.   Cardiovascular: Normal rate, regular rhythm " and normal heart sounds.   No murmur heard.  Pulmonary/Chest: Effort normal and breath sounds normal.   Musculoskeletal: Normal range of motion. He exhibits no edema.   Seated straight leg raise normal  Normal gait   Lymphadenopathy:     He has no cervical adenopathy.   Neurological: He is alert and oriented to person, place, and time. Coordination normal.   Skin: Skin is warm and dry.   Psychiatric: He has a normal mood and affect. His behavior is normal. Thought content normal.

## 2019-12-18 ENCOUNTER — OFFICE VISIT (OUTPATIENT)
Dept: FAMILY MEDICINE | Facility: CLINIC | Age: 64
End: 2019-12-18
Payer: MEDICARE

## 2019-12-18 ENCOUNTER — PATIENT OUTREACH (OUTPATIENT)
Dept: ADMINISTRATIVE | Facility: HOSPITAL | Age: 64
End: 2019-12-18

## 2019-12-18 VITALS
HEART RATE: 76 BPM | BODY MASS INDEX: 24.51 KG/M2 | OXYGEN SATURATION: 96 % | WEIGHT: 191 LBS | SYSTOLIC BLOOD PRESSURE: 122 MMHG | DIASTOLIC BLOOD PRESSURE: 80 MMHG | HEIGHT: 74 IN | TEMPERATURE: 98 F

## 2019-12-18 DIAGNOSIS — G89.29 CHRONIC MIDLINE LOW BACK PAIN WITHOUT SCIATICA: ICD-10-CM

## 2019-12-18 DIAGNOSIS — I10 ESSENTIAL HYPERTENSION: ICD-10-CM

## 2019-12-18 DIAGNOSIS — M54.50 CHRONIC MIDLINE LOW BACK PAIN WITHOUT SCIATICA: ICD-10-CM

## 2019-12-18 DIAGNOSIS — R80.9 CONTROLLED TYPE 2 DIABETES MELLITUS WITH MICROALBUMINURIA, WITHOUT LONG-TERM CURRENT USE OF INSULIN: ICD-10-CM

## 2019-12-18 DIAGNOSIS — E11.29 CONTROLLED TYPE 2 DIABETES MELLITUS WITH MICROALBUMINURIA, WITHOUT LONG-TERM CURRENT USE OF INSULIN: ICD-10-CM

## 2019-12-18 DIAGNOSIS — F11.90 CHRONIC NARCOTIC USE: Primary | ICD-10-CM

## 2019-12-18 PROCEDURE — 99214 PR OFFICE/OUTPT VISIT, EST, LEVL IV, 30-39 MIN: ICD-10-PCS | Mod: S$PBB,,, | Performed by: INTERNAL MEDICINE

## 2019-12-18 PROCEDURE — 99213 OFFICE O/P EST LOW 20 MIN: CPT | Mod: PBBFAC,PO | Performed by: INTERNAL MEDICINE

## 2019-12-18 PROCEDURE — 99999 PR PBB SHADOW E&M-EST. PATIENT-LVL III: ICD-10-PCS | Mod: PBBFAC,,, | Performed by: INTERNAL MEDICINE

## 2019-12-18 PROCEDURE — 99214 OFFICE O/P EST MOD 30 MIN: CPT | Mod: S$PBB,,, | Performed by: INTERNAL MEDICINE

## 2019-12-18 PROCEDURE — 99999 PR PBB SHADOW E&M-EST. PATIENT-LVL III: CPT | Mod: PBBFAC,,, | Performed by: INTERNAL MEDICINE

## 2019-12-18 RX ORDER — HYDROCODONE BITARTRATE AND ACETAMINOPHEN 10; 325 MG/1; MG/1
1 TABLET ORAL
Qty: 30 TABLET | Refills: 0 | Status: SHIPPED | OUTPATIENT
Start: 2020-02-03 | End: 2020-03-04

## 2019-12-18 RX ORDER — ALBUTEROL SULFATE 90 UG/1
AEROSOL, METERED RESPIRATORY (INHALATION)
COMMUNITY
Start: 2019-11-19 | End: 2021-04-15 | Stop reason: ALTCHOICE

## 2019-12-18 RX ORDER — HYDROCODONE BITARTRATE AND ACETAMINOPHEN 10; 325 MG/1; MG/1
1 TABLET ORAL
Qty: 30 TABLET | Refills: 0 | Status: SHIPPED | OUTPATIENT
Start: 2020-03-02 | End: 2020-02-04 | Stop reason: SDUPTHER

## 2019-12-18 RX ORDER — FLUTICASONE PROPIONATE 50 MCG
SPRAY, SUSPENSION (ML) NASAL
COMMUNITY
Start: 2019-12-16 | End: 2022-07-26

## 2019-12-18 RX ORDER — HYDROCODONE BITARTRATE AND ACETAMINOPHEN 10; 325 MG/1; MG/1
1 TABLET ORAL
Qty: 30 TABLET | Refills: 0 | Status: SHIPPED | OUTPATIENT
Start: 2020-01-04 | End: 2020-02-04 | Stop reason: SDUPTHER

## 2019-12-18 RX ORDER — BUDESONIDE AND FORMOTEROL FUMARATE DIHYDRATE 160; 4.5 UG/1; UG/1
AEROSOL RESPIRATORY (INHALATION)
COMMUNITY
Start: 2019-12-15 | End: 2021-01-22 | Stop reason: ALTCHOICE

## 2020-02-04 DIAGNOSIS — M54.50 CHRONIC MIDLINE LOW BACK PAIN WITHOUT SCIATICA: ICD-10-CM

## 2020-02-04 DIAGNOSIS — F11.90 CHRONIC NARCOTIC USE: ICD-10-CM

## 2020-02-04 DIAGNOSIS — G89.29 CHRONIC MIDLINE LOW BACK PAIN WITHOUT SCIATICA: ICD-10-CM

## 2020-02-04 RX ORDER — HYDROCODONE BITARTRATE AND ACETAMINOPHEN 10; 325 MG/1; MG/1
1 TABLET ORAL
Qty: 30 TABLET | Refills: 0 | Status: SHIPPED | OUTPATIENT
Start: 2020-03-02 | End: 2020-04-05 | Stop reason: SDUPTHER

## 2020-02-04 RX ORDER — HYDROCODONE BITARTRATE AND ACETAMINOPHEN 10; 325 MG/1; MG/1
1 TABLET ORAL
Qty: 30 TABLET | Refills: 0 | Status: SHIPPED | OUTPATIENT
Start: 2020-02-04 | End: 2020-04-05 | Stop reason: SDUPTHER

## 2020-02-28 ENCOUNTER — PATIENT OUTREACH (OUTPATIENT)
Dept: ADMINISTRATIVE | Facility: HOSPITAL | Age: 65
End: 2020-02-28

## 2020-03-04 ENCOUNTER — TELEPHONE (OUTPATIENT)
Dept: FAMILY MEDICINE | Facility: CLINIC | Age: 65
End: 2020-03-04

## 2020-03-04 NOTE — TELEPHONE ENCOUNTER
----- Message from Miranda Lee sent at 3/4/2020  9:24 AM CST -----  Contact: Self   Type: RX Refill Request    Who Called:  Self     Have you contacted your pharmacy: no     Refill or New Rx: refill     RX Name and Strength:HYDROcodone-acetaminophen (NORCO)  mg per tablet    Preferred Pharmacy with phone number:[facility] - [place], [place] - [address] [phone] (Phone)  [phone] (Fax)        Local or Mail Order: Local     Ordering Provider: Dr. March     Would the patient rather a call back or a response via [facility]?  Call     Best Call Back Number:[phone]

## 2020-03-04 NOTE — TELEPHONE ENCOUNTER
Spoke with the patient and inform him that a refill on his [place] was sent to the pharmacy, month 2/20 and 3/20.  Patient verbalized understandings.

## 2020-03-06 LAB
LEFT EYE DM RETINOPATHY: NEGATIVE
RIGHT EYE DM RETINOPATHY: NEGATIVE

## 2020-03-13 ENCOUNTER — PATIENT OUTREACH (OUTPATIENT)
Dept: ADMINISTRATIVE | Facility: HOSPITAL | Age: 65
End: 2020-03-13

## 2020-04-05 ENCOUNTER — TELEPHONE (OUTPATIENT)
Dept: FAMILY MEDICINE | Facility: CLINIC | Age: 65
End: 2020-04-05

## 2020-04-05 DIAGNOSIS — G89.29 CHRONIC MIDLINE LOW BACK PAIN WITHOUT SCIATICA: ICD-10-CM

## 2020-04-05 DIAGNOSIS — E11.9 CONTROLLED TYPE 2 DIABETES MELLITUS WITHOUT COMPLICATION, WITHOUT LONG-TERM CURRENT USE OF INSULIN: ICD-10-CM

## 2020-04-05 DIAGNOSIS — M54.50 CHRONIC MIDLINE LOW BACK PAIN WITHOUT SCIATICA: ICD-10-CM

## 2020-04-05 DIAGNOSIS — F11.90 CHRONIC NARCOTIC USE: ICD-10-CM

## 2020-04-05 RX ORDER — HYDROCODONE BITARTRATE AND ACETAMINOPHEN 10; 325 MG/1; MG/1
1 TABLET ORAL
Qty: 30 TABLET | Refills: 0 | Status: SHIPPED | OUTPATIENT
Start: 2020-04-05 | End: 2020-04-06 | Stop reason: SDUPTHER

## 2020-04-05 RX ORDER — HYDROCODONE BITARTRATE AND ACETAMINOPHEN 10; 325 MG/1; MG/1
1 TABLET ORAL
Qty: 30 TABLET | Refills: 0 | Status: SHIPPED | OUTPATIENT
Start: 2020-05-05 | End: 2020-04-06 | Stop reason: SDUPTHER

## 2020-04-05 NOTE — TELEPHONE ENCOUNTER
----- Message from Zeinab Lopez sent at 4/4/2020  9:18 AM CDT -----  Contact: [phone]-self  Patient is requesting a call back concerning refills on his medication for HYDROcodone-acetaminophen (NORCO)  mg per tablet, pt states he is out of the medication. Please call

## 2020-04-06 ENCOUNTER — TELEPHONE (OUTPATIENT)
Dept: FAMILY MEDICINE | Facility: CLINIC | Age: 65
End: 2020-04-06

## 2020-04-06 DIAGNOSIS — M54.50 CHRONIC MIDLINE LOW BACK PAIN WITHOUT SCIATICA: ICD-10-CM

## 2020-04-06 DIAGNOSIS — G89.29 CHRONIC MIDLINE LOW BACK PAIN WITHOUT SCIATICA: ICD-10-CM

## 2020-04-06 DIAGNOSIS — I10 ESSENTIAL HYPERTENSION: ICD-10-CM

## 2020-04-06 DIAGNOSIS — F11.90 CHRONIC NARCOTIC USE: ICD-10-CM

## 2020-04-06 RX ORDER — HYDROCODONE BITARTRATE AND ACETAMINOPHEN 10; 325 MG/1; MG/1
1 TABLET ORAL
Qty: 30 TABLET | Refills: 0 | Status: SHIPPED | OUTPATIENT
Start: 2020-04-06 | End: 2020-04-17 | Stop reason: SDUPTHER

## 2020-04-06 RX ORDER — IRBESARTAN 300 MG/1
300 TABLET ORAL NIGHTLY
Qty: 90 TABLET | Refills: 3 | Status: SHIPPED | OUTPATIENT
Start: 2020-04-06 | End: 2021-01-22 | Stop reason: ALTCHOICE

## 2020-04-06 RX ORDER — AMLODIPINE BESYLATE 2.5 MG/1
2.5 TABLET ORAL DAILY
Qty: 90 TABLET | Refills: 3 | Status: SHIPPED | OUTPATIENT
Start: 2020-04-06 | End: 2020-08-24 | Stop reason: SDUPTHER

## 2020-04-06 RX ORDER — HYDROCODONE BITARTRATE AND ACETAMINOPHEN 10; 325 MG/1; MG/1
1 TABLET ORAL
Qty: 30 TABLET | Refills: 0 | Status: SHIPPED | OUTPATIENT
Start: 2020-05-05 | End: 2020-04-17 | Stop reason: SDUPTHER

## 2020-04-06 RX ORDER — IRBESARTAN 300 MG/1
300 TABLET ORAL NIGHTLY
Qty: 90 TABLET | Refills: 3 | Status: SHIPPED | OUTPATIENT
Start: 2020-04-06 | End: 2020-04-06 | Stop reason: SDUPTHER

## 2020-04-06 RX ORDER — AMLODIPINE BESYLATE 2.5 MG/1
2.5 TABLET ORAL DAILY
Qty: 90 TABLET | Refills: 3 | Status: SHIPPED | OUTPATIENT
Start: 2020-04-06 | End: 2020-04-06 | Stop reason: SDUPTHER

## 2020-04-06 NOTE — TELEPHONE ENCOUNTER
----- Message from Uday Fairchild sent at 4/6/2020  9:27 AM CDT -----  Contact: Paulino [phone]  Type: Patient Call Back    Who called:Paulino     What is the request in detail: The patient is calling to request that a staff member reach out to his pharmacy to give a verbal to refill his pain medication. The patient stated that he called his pharmacy and was told that there computer system is down and unable to retrieve his script. He would like the staff to call over    Can the clinic reply by [facility]? No     Would the patient rather a call back or a response via My [facility]? Call back     Best call back number:[phone]

## 2020-04-06 NOTE — TELEPHONE ENCOUNTER
Spoke with pt is requesting rx be called in due to computers being down. Informed pt [place] was sent to pharmacy.Pt verbalized understanding.

## 2020-04-14 ENCOUNTER — TELEPHONE (OUTPATIENT)
Dept: FAMILY MEDICINE | Facility: CLINIC | Age: 65
End: 2020-04-14

## 2020-04-14 NOTE — TELEPHONE ENCOUNTER
----- Message from Nish Vega sent at 4/14/2020  9:24 AM CDT -----  Contact: pt  Pt called and wanted to have someone from your office call him back    I could not hear what the pt ws trying to say, Pt was walking outside and the wind was blowing into the phone.    Pt can be reached at [phone]

## 2020-04-16 ENCOUNTER — TELEPHONE (OUTPATIENT)
Dept: FAMILY MEDICINE | Facility: CLINIC | Age: 65
End: 2020-04-16

## 2020-04-16 NOTE — TELEPHONE ENCOUNTER
Spoke with patient and he informed me that he was seen in the [place] at [facility] 2 days ago for Umbilical Hernia. Patient said that he has this about 20 years ago. Patient said that he was told to f/u with provider. Noted in chart that patient was to schedule appointment with [facility] Gastro surgeon 5/13/20. Patient decline doing this.He said that he wanted to see his provider. Patient is not able to self download melchor to his phone for virtual visit. Patient schedule audio visit for 4/17/20 with provider.

## 2020-04-16 NOTE — TELEPHONE ENCOUNTER
----- Message from Ann Carlson sent at 4/16/2020  8:21 AM CDT -----  Contact: Patient [phone]  Type:  Patient Returning Call    Who Called: Patient    Who Left Message for Patient: Karina    Does the patient know what this is regarding?: Hernia/ ER    Would the patient rather a call back or a response via My [facility]? Call back    Best Call Back Number: [phone]

## 2020-04-16 NOTE — PROGRESS NOTES
This note was created by combination of typed  and M-Modal dictation.  Transcription errors may be present.  If there are any questions, please contact me.    Assessment & Plan:   Umbilical hernia without obstruction and without gangrene  -painful, notes no constipation, notes some improvement and less sensation of swelling.  Has been taking his narcotic BID instead of daily.  Plan for BID for the next week and then back to daily.  Advised to avoid straining and lifting.  Referral to gen surgery.  -     Ambulatory referral/consult to General Surgery; Future; Expected date: 04/24/2020    Liver mass  Common bile duct dilation  -hx of viral hepatitis, [facility] and [facility] like [facility] have been [facility] - something with contrast - unclear to me if they planning CT or MRI.  [facility]    Essential hypertension  -refilled coreg.  I fill coreg and amlodipine and pain meds; all others [facility]  -     carvediloL (COREG) 12.5 MG tablet; Take 1 tablet (12.5 mg total) by mouth 2 (two) times daily with meals.  Dispense: 180 tablet; Refill: 3    Chronic narcotic use  Chronic midline low back pain without sciatica; by report, 9/8/2011 MRI lumbar spine schmorl's nodes and ligamentous hypertrophy, root impingment noted  -due to increased frequency with the umbilical hernia, will need RF early - 4/30.  rx's sent to pharmacy  Denies SE of meds   no aberrancy  Intensive monitoring of high risk medication.  -     HYDROcodone-acetaminophen (NORCO)  mg per tablet; Take 1 tablet by mouth every 24 hours as needed for Pain.  Dispense: 30 tablet; Refill: 0  -     HYDROcodone-acetaminophen (NORCO)  mg per tablet; Take 1 tablet by mouth every 24 hours as needed for Pain.  Dispense: 30 tablet; Refill: 0  -     HYDROcodone-acetaminophen (NORCO)  mg per tablet; Take 1 tablet by mouth every 24 hours as needed for Pain.  Dispense: 30 tablet; Refill: 0    Counseling regarding end  "of life decision making  -will mail him forms regarding advanced directives.  Notes that Yobany Meadows should make decisions  After that his daughter Shirley Meadows  And he notes that he would like "everything done", presumably to mean vent support and CPR, even in the face of inevitable death.    Medications Discontinued During This Encounter   Medication Reason    carvedilol (COREG) 12.5 MG tablet Reorder    HYDROcodone-acetaminophen (NORCO)  mg per tablet Reorder    HYDROcodone-acetaminophen (NORCO)  mg per tablet Reorder       meds sent this encounter:  Medications Ordered This Encounter   Medications    carvediloL (COREG) 12.5 MG tablet     Sig: Take 1 tablet (12.5 mg total) by mouth 2 (two) times daily with meals.     Dispense:  180 tablet     Refill:  3    HYDROcodone-acetaminophen (NORCO)  mg per tablet     Sig: Take 1 tablet by mouth every 24 hours as needed for Pain.     Dispense:  30 tablet     Refill:  0     Quantity prescribed more than 7 day supply? Yes, quantity medically necessary    HYDROcodone-acetaminophen (NORCO)  mg per tablet     Sig: Take 1 tablet by mouth every 24 hours as needed for Pain.     Dispense:  30 tablet     Refill:  0     Quantity prescribed more than 7 day supply? Yes, quantity medically necessary    HYDROcodone-acetaminophen (NORCO)  mg per tablet     Sig: Take 1 tablet by mouth every 24 hours as needed for Pain.     Dispense:  30 tablet     Refill:  0     Quantity prescribed more than 7 day supply? Yes, quantity medically necessary       Follow Up: No follow-ups on file.    Subjective:     Chief Complaint   Patient presents with    Hernia    Chronic Pain    Hypertension       ALEKSANDER Bob is a 64 y.o. male, last appointment with this clinic was 12/18/2019.    The patient location is: [place]  The chief complaint leading to consultation is: hernia  Visit type: Virtual visit with synchronous audio and video  Total time spent with patient: 21 " min  Each patient to whom he or she provides medical services by telemedicine is:  (1) informed of the relationship between the physician and patient and the respective role of any other health care provider with respect to management of the patient; and (2) notified that he or she may decline to receive medical services by telemedicine and may withdraw from such care at any time.    Notes:  ER visit for umbilical hernia.  CT not c/w incarceration.  Mildly dilated loop of small bowel in the left lower quadrant could represent a focus of enteritis.  Hypervascular foci in the liver may represent transient hepatic attenuation differences.  An MRI of the abdomen with and without contrast could be obtained for further assessment to rule out alternative etiologies.  Dilatation of the common bile duct.  This could be further assessed with an MRCP.    In regards to the hernia, notes the pain has improved some, sensation of swelling seems to have gone away.  No visible rash.  No constipation.  Urination is normal.  Normal appetite.  He has been taking some of the Zofran.  But he has also been taking the hydrocodone twice a day instead of his usual daily.  For the past 2 days.  We discussed this.  I anticipate that he may take it twice a day for the next week and hopefully he will continue to improve and go back to once daily.  He recalls having had umbilical hernia repair twice previously.    We talked about the abnormal findings on the CT.  He recalls a history of viral hepatitis and it sounds [facility].  He reports that he was supposed to get some sort of imaging done with IV dye but due to the coronavirus pandemic that was put on hold.  So it sounds like he is getting some sort of imaging, whether is CT or MRI it is unclear.    We reviewed his medications.  He reports he is taking all the medicines listed.  I feel his amlodipine and his carvedilol.  All other [facility].    I also fill his pain medicines.  Because he is taking it twice a day he will need an early refill.  He is due for refill may the 6 and I will put in for April 30th.    We talked about advanced directives.  He does not have anything listed.  He reports that power of healthcare  would be his brother and then after that his daughter.  And we also talked about living will.  He notes that in instance of inevitable death he would still want aggressive care including continued ventilation support.  I have asked him to fill out some paperwork and send it back to us.      Patient Care Team:  Rogelio March MD as PCP - General (Internal Medicine)  Fermin Lee Jr., MD as Consulting Physician (Family Medicine)  Lashon Garcia MA as Care Coordinator  [facility]    Patient Active Problem List    Diagnosis Date Noted    Anemia of chronic disease on labs 9/2019 09/11/2019    GERD (gastroesophageal reflux disease) 05/14/2019    COPD (chronic obstructive pulmonary disease) with reported hx of abn PFTs 05/14/2019    Primary osteoarthritis of both knees hx of injection 05/14/2019    History of hepatitis C s/p treatment per pt.  [facility], reported MRI liver 8/2018 08/29/2018    Idiopathic gout of multiple sites 11/24/2017    Chronic midline low back pain without sciatica; by report, 9/8/2011 MRI lumbar spine schmorl's nodes and ligamentous hypertrophy, root impingment noted 10/24/2017    Chronic narcotic use 10/24/2017    Essential hypertension 10/24/2017    Controlled type 2 diabetes mellitus with microalbuminuria, without long-term current use of insulin 10/24/2017    Coronary artery disease involving native coronary artery of native heart without angina pectoris 10/24/2017    Benign prostatic hyperplasia without lower urinary tract symptoms 10/24/2017    Smoker 10/24/2017       PAST MEDICAL HISTORY:  Past Medical History:   Diagnosis Date    Anemia     Chronic  prescription opiate use     Diabetes mellitus, type 2     Hepatitis C     Hyperlipidemia     Hypertension        PAST SURGICAL HISTORY:  No past surgical history on file.    SOCIAL HISTORY:  Social History     Socioeconomic History    Marital status: Single     Spouse name: Not on file    Number of children: Not on file    Years of education: Not on file    Highest education level: Not on file   Occupational History    Occupation: disabled - orthopedic   Social Needs    Financial resource strain: Not on file    Food insecurity:     Worry: Not on file     Inability: Not on file    Transportation needs:     Medical: Not on file     Non-medical: Not on file   Tobacco Use    Smoking status: Current Every Day Smoker     Packs/day: 0.10     Types: Cigarettes    Smokeless tobacco: Never Used   Substance and Sexual Activity    Alcohol use: Not on file    Drug use: No    Sexual activity: Yes   Lifestyle    Physical activity:     Days per week: Not on file     Minutes per session: Not on file    Stress: Not at all   Relationships    Social connections:     Talks on phone: Not on file     Gets together: Not on file     Attends [employer] service: Not on file     Active member of club or organization: Not on file     Attends meetings of clubs or organizations: Not on file     Relationship status: Not on file   Other Topics Concern    Not on file   Social History Narrative    Not on file       ALLERGIES AND MEDICATIONS: updated and reviewed.  Review of patient's allergies indicates:   Allergen Reactions    Ace inhibitors      Current Outpatient Medications   Medication Sig Dispense Refill    allopurinol (ZYLOPRIM) 100 MG tablet Take 1 tablet (100 mg total) by mouth once daily. Decreased dose 90 tablet 3    amLODIPine (NORVASC) 2.5 MG tablet Take 1 tablet (2.5 mg total) by mouth once daily. 90 tablet 3    carvedilol (COREG) 12.5 MG tablet Take 1 tablet (12.5 mg total) by mouth 2 (two) times daily with  meals. 180 tablet 3    clopidogrel (PLAVIX) 75 mg tablet Take 75 mg by mouth once daily.      finasteride (PROSCAR) 5 mg tablet Take 5 mg by mouth once daily.      fluticasone propionate (FLONASE) 50 mcg/actuation nasal spray       glipiZIDE (GLUCOTROL) 5 MG tablet Take 1 tablet (5 mg total) by mouth 2 (two) times daily before meals. 180 tablet 3    [START ON 5/5/2020] HYDROcodone-acetaminophen (NORCO)  mg per tablet Take 1 tablet by mouth every 24 hours as needed for Pain. 30 tablet 0    HYDROcodone-acetaminophen (NORCO)  mg per tablet Take 1 tablet by mouth every 24 hours as needed for Pain. 30 tablet 0    irbesartan (AVAPRO) 300 MG tablet Take 1 tablet (300 mg total) by mouth every evening. 90 tablet 3    metFORMIN (GLUCOPHAGE) 500 MG tablet Take 2 tablets (1,000 mg total) by mouth 2 (two) times daily with meals. Increased dose 360 tablet 3    omeprazole (PRILOSEC) 20 MG capsule Take 20 mg by mouth once daily.      ondansetron (ZOFRAN) 4 MG tablet Take 1 tablet (4 mg total) by mouth every 6 (six) hours. 12 tablet 0    simvastatin (ZOCOR) 40 MG tablet Take 0.5 tablets (20 mg total) by mouth every evening. 90 tablet 3    SYMBICORT 160-4.5 mcg/actuation HFAA       VENTOLIN HFA 90 mcg/actuation inhaler        No current facility-administered medications for this visit.        Review of Systems   Constitutional: Negative for chills and fever.   Gastrointestinal: Negative for blood in stool, constipation, diarrhea and heartburn.   Genitourinary: Negative for dysuria, frequency and urgency.       Objective:   Physical Exam   There were no vitals filed for this visit. There is no height or weight on file to calculate BMI.          Telephonic visit, no exam done

## 2020-04-17 ENCOUNTER — OFFICE VISIT (OUTPATIENT)
Dept: FAMILY MEDICINE | Facility: CLINIC | Age: 65
End: 2020-04-17
Payer: MEDICARE

## 2020-04-17 DIAGNOSIS — F11.90 CHRONIC NARCOTIC USE: ICD-10-CM

## 2020-04-17 DIAGNOSIS — M54.50 CHRONIC MIDLINE LOW BACK PAIN WITHOUT SCIATICA: ICD-10-CM

## 2020-04-17 DIAGNOSIS — Z71.89 COUNSELING REGARDING END OF LIFE DECISION MAKING: ICD-10-CM

## 2020-04-17 DIAGNOSIS — I10 ESSENTIAL HYPERTENSION: ICD-10-CM

## 2020-04-17 DIAGNOSIS — K42.9 UMBILICAL HERNIA WITHOUT OBSTRUCTION AND WITHOUT GANGRENE: Primary | ICD-10-CM

## 2020-04-17 DIAGNOSIS — R16.0 LIVER MASS: ICD-10-CM

## 2020-04-17 DIAGNOSIS — G89.29 CHRONIC MIDLINE LOW BACK PAIN WITHOUT SCIATICA: ICD-10-CM

## 2020-04-17 DIAGNOSIS — K83.8 COMMON BILE DUCT DILATION: ICD-10-CM

## 2020-04-17 PROCEDURE — 99214 OFFICE O/P EST MOD 30 MIN: CPT | Mod: 95,,, | Performed by: INTERNAL MEDICINE

## 2020-04-17 PROCEDURE — 99214 PR OFFICE/OUTPT VISIT, EST, LEVL IV, 30-39 MIN: ICD-10-PCS | Mod: 95,,, | Performed by: INTERNAL MEDICINE

## 2020-04-17 RX ORDER — CARVEDILOL 12.5 MG/1
12.5 TABLET ORAL 2 TIMES DAILY WITH MEALS
Qty: 180 TABLET | Refills: 3 | Status: SHIPPED | OUTPATIENT
Start: 2020-04-17 | End: 2020-11-06 | Stop reason: ALTCHOICE

## 2020-04-17 RX ORDER — HYDROCODONE BITARTRATE AND ACETAMINOPHEN 10; 325 MG/1; MG/1
1 TABLET ORAL
Qty: 30 TABLET | Refills: 0 | Status: SHIPPED | OUTPATIENT
Start: 2020-06-29 | End: 2020-06-30 | Stop reason: SDUPTHER

## 2020-04-17 RX ORDER — HYDROCODONE BITARTRATE AND ACETAMINOPHEN 10; 325 MG/1; MG/1
1 TABLET ORAL
Qty: 30 TABLET | Refills: 0 | Status: ON HOLD | OUTPATIENT
Start: 2020-04-17 | End: 2020-06-08 | Stop reason: HOSPADM

## 2020-04-17 RX ORDER — HYDROCODONE BITARTRATE AND ACETAMINOPHEN 10; 325 MG/1; MG/1
1 TABLET ORAL
Qty: 30 TABLET | Refills: 0 | Status: SHIPPED | OUTPATIENT
Start: 2020-05-30 | End: 2020-05-18 | Stop reason: SDUPTHER

## 2020-04-17 NOTE — LETTER
April 17, 2020    Paulino Meadows  [address] [place] [address]             [facility]  [address] [facility]  [place] [address]  Phone: [phone]  Fax: [phone] Dear Mr. Meadows,    Here are the forms we talked about - the Advanced Directives and the Power of Healthcare . Please fill these out as soon as possible and mail them back to us.    Wishing you good health,    Rogelio March MD

## 2020-04-30 ENCOUNTER — TELEPHONE (OUTPATIENT)
Dept: FAMILY MEDICINE | Facility: CLINIC | Age: 65
End: 2020-04-30

## 2020-04-30 NOTE — LETTER
April 30, 2020    Paulino Meadows  [address] [place] [address]             [facility]  [address] [facility] [place] [address]  Phone: [phone]  Fax: [phone] Dear Mr. Mcginnisy:    Sorry we were unable to contact you to schedule your General Surgery appointment. Please give the referral department a call at [phone].        If you have any questions or concerns, please don't hesitate to call.    Sincerely,        Shalonda Hugo MA

## 2020-05-05 ENCOUNTER — PATIENT OUTREACH (OUTPATIENT)
Dept: ADMINISTRATIVE | Facility: OTHER | Age: 65
End: 2020-05-05

## 2020-05-07 ENCOUNTER — OFFICE VISIT (OUTPATIENT)
Dept: SURGERY | Facility: CLINIC | Age: 65
End: 2020-05-07
Payer: MEDICARE

## 2020-05-07 VITALS
HEART RATE: 64 BPM | TEMPERATURE: 98 F | HEIGHT: 74 IN | DIASTOLIC BLOOD PRESSURE: 63 MMHG | RESPIRATION RATE: 16 BRPM | OXYGEN SATURATION: 94 % | WEIGHT: 180.88 LBS | SYSTOLIC BLOOD PRESSURE: 92 MMHG | BODY MASS INDEX: 23.21 KG/M2

## 2020-05-07 DIAGNOSIS — D49.0 LIVER NEOPLASM: ICD-10-CM

## 2020-05-07 DIAGNOSIS — R93.2 ABNORMAL FINDINGS ON DIAGNOSTIC IMAGING OF LIVER AND BILIARY TRACT: ICD-10-CM

## 2020-05-07 DIAGNOSIS — D17.1 LIPOMA OF BUTTOCK: ICD-10-CM

## 2020-05-07 DIAGNOSIS — R79.89 OTHER SPECIFIED ABNORMAL FINDINGS OF BLOOD CHEMISTRY: ICD-10-CM

## 2020-05-07 DIAGNOSIS — R97.8 OTHER ABNORMAL TUMOR MARKERS: ICD-10-CM

## 2020-05-07 DIAGNOSIS — R16.0 LIVER MASS: Primary | ICD-10-CM

## 2020-05-07 PROCEDURE — 99999 PR PBB SHADOW E&M-EST. PATIENT-LVL IV: ICD-10-PCS | Mod: PBBFAC,,, | Performed by: SURGERY

## 2020-05-07 PROCEDURE — 99204 OFFICE O/P NEW MOD 45 MIN: CPT | Mod: S$PBB,,, | Performed by: SURGERY

## 2020-05-07 PROCEDURE — 99999 PR PBB SHADOW E&M-EST. PATIENT-LVL IV: CPT | Mod: PBBFAC,,, | Performed by: SURGERY

## 2020-05-07 PROCEDURE — 99214 OFFICE O/P EST MOD 30 MIN: CPT | Mod: PBBFAC,PN | Performed by: SURGERY

## 2020-05-07 PROCEDURE — 99204 PR OFFICE/OUTPT VISIT, NEW, LEVL IV, 45-59 MIN: ICD-10-PCS | Mod: S$PBB,,, | Performed by: SURGERY

## 2020-05-07 NOTE — LETTER
May 11, 2020      Rogelio March MD  [address]  [place] [address]           [facility]  [address], [facility] [address]  [facility] [address]  Phone: [phone]  Fax: [phone]          Patient: Paulino Meadows   MR Number: [id number]   YOB: 1955   Date of Visit: 5/7/2020       Dear Dr. Rogelio March:    Thank you for referring Paulino Meadows to me for evaluation. Attached you will find relevant portions of my assessment and plan of care.    If you have questions, please do not hesitate to call me. I look forward to following Paulino Meadows along with you.    Sincerely,    Jed Dickerson MD    Enclosure  CC:  No Recipients    If you would like to receive this communication electronically, please contact [email or web address] or [phone] to request more information on [employer] Link access.    For providers and/or their staff who would like to refer a patient to [facility], please contact us through our one-stop-shop provider referral line, [facility], at [phone].    If you feel you have received this communication in error or would no longer like to receive these types of communications, please e-mail [email or web address]

## 2020-05-11 ENCOUNTER — PATIENT OUTREACH (OUTPATIENT)
Dept: ADMINISTRATIVE | Facility: OTHER | Age: 65
End: 2020-05-11

## 2020-05-11 NOTE — PROGRESS NOTES
History & Physical    SUBJECTIVE:     History of Present Illness:  Patient is a 64 y.o. male presents with a ventral hernia above his umbilicus.  States it has gradually gotten larger and is causing more discomfort now.  He had a CT scan performed which also showed a dilated common bile duct but had normal lab values.  He also was found to have a fatty density in his gluteal area approximately 14 cm total.  MRI was obtained which showed the dilated common bile duct but no filling defects.  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  Patient will come back for follow-up will discuss these findings.      Chief Complaint   Patient presents with    Consult    Hernia       Review of patient's allergies indicates:   Allergen Reactions    Ace inhibitors        Current Outpatient Medications   Medication Sig Dispense Refill    allopurinol (ZYLOPRIM) 100 MG tablet Take 1 tablet (100 mg total) by mouth once daily. Decreased dose 90 tablet 3    amLODIPine (NORVASC) 2.5 MG tablet Take 1 tablet (2.5 mg total) by mouth once daily. 90 tablet 3    carvediloL (COREG) 12.5 MG tablet Take 1 tablet (12.5 mg total) by mouth 2 (two) times daily with meals. 180 tablet 3    clopidogrel (PLAVIX) 75 mg tablet Take 75 mg by mouth once daily.      finasteride (PROSCAR) 5 mg tablet Take 5 mg by mouth once daily.      fluticasone propionate (FLONASE) 50 mcg/actuation nasal spray       glipiZIDE (GLUCOTROL) 5 MG tablet Take 1 tablet (5 mg total) by mouth 2 (two) times daily before meals. 180 tablet 3    [START ON 5/30/2020] HYDROcodone-acetaminophen (NORCO)  mg per tablet Take 1 tablet by mouth every 24 hours as needed for Pain. 30 tablet 0    HYDROcodone-acetaminophen (NORCO)  mg per tablet Take 1 tablet by mouth every 24 hours as needed for Pain. 30 tablet 0    [START ON 6/29/2020] HYDROcodone-acetaminophen (NORCO)  mg per tablet Take 1 tablet by mouth every 24 hours as needed for Pain. 30 tablet 0     irbesartan (AVAPRO) 300 MG tablet Take 1 tablet (300 mg total) by mouth every evening. 90 tablet 3    metFORMIN (GLUCOPHAGE) 500 MG tablet Take 2 tablets (1,000 mg total) by mouth 2 (two) times daily with meals. Increased dose 360 tablet 3    omeprazole (PRILOSEC) 20 MG capsule Take 20 mg by mouth once daily.      ondansetron (ZOFRAN) 4 MG tablet Take 1 tablet (4 mg total) by mouth every 6 (six) hours. 12 tablet 0    simvastatin (ZOCOR) 40 MG tablet Take 0.5 tablets (20 mg total) by mouth every evening. 90 tablet 3    SYMBICORT 160-4.5 mcg/actuation HFAA       VENTOLIN HFA 90 mcg/actuation inhaler        No current facility-administered medications for this visit.      Facility-Administered Medications Ordered in Other Visits   Medication Dose Route Frequency Provider Last Rate Last Dose    gadobutroL (GADAVIST) injection 8.5 mL  8.5 mL Intravenous ONCE PRN Jed Dickerson MD           Past Medical History:   Diagnosis Date    Anemia     Chronic prescription opiate use     Diabetes mellitus, type 2     Hepatitis C     Hyperlipidemia     Hypertension      History reviewed. No pertinent surgical history.  Family History   Problem Relation Age of Onset    Coronary artery disease Mother     Diabetes Father     Valvular heart disease Sister     Lung disease Sister     Diabetes Brother     No Known Problems Daughter     No Known Problems Son     Diabetes Brother      Social History     Tobacco Use    Smoking status: Current Every Day Smoker     Packs/day: 0.10     Types: Cigarettes    Smokeless tobacco: Never Used   Substance Use Topics    Alcohol use: Not on file    Drug use: No        Review of Systems:  Review of Systems   Constitutional: Negative for appetite change, fatigue, fever and unexpected weight change.   HENT: Negative for sore throat and trouble swallowing.    Eyes: Negative.    Respiratory: Negative for cough, shortness of breath and wheezing.    Cardiovascular: Negative for  "chest pain and leg swelling.   Gastrointestinal: Positive for abdominal pain (Near umbilicus at hernia). Negative for abdominal distention, blood in stool, constipation, diarrhea, nausea and vomiting.   Endocrine: Negative.    Genitourinary: Negative.    Musculoskeletal: Negative for back pain.   Skin: Negative.  Negative for rash.   Allergic/Immunologic: Negative.    Neurological: Negative.    Hematological: Negative.    Psychiatric/Behavioral: Negative for confusion.       OBJECTIVE:     Vital Signs (Most Recent)  Temp: 97.7 °F (36.5 °C) (05/07/20 1341)  Pulse: 64 (05/07/20 1341)  Resp: 16 (05/07/20 1341)  BP: 92/63 (05/07/20 1341)  SpO2: (!) 94 % (05/07/20 1341)  6' 2" (1.88 m)  82.1 kg (180 lb 14.2 oz)     Physical Exam:  Physical Exam   Constitutional: He is oriented to person, place, and time. He appears well-developed and well-nourished.   HENT:   Head: Normocephalic and atraumatic.   Eyes: EOM are normal.   Neck: Normal range of motion.   Cardiovascular: Normal rate and normal heart sounds.   Pulmonary/Chest: Effort normal.   Abdominal: Soft. Bowel sounds are normal. He exhibits no distension. There is no tenderness.   Musculoskeletal: Normal range of motion.   Neurological: He is alert and oriented to person, place, and time.   Skin: Skin is warm and dry. Capillary refill takes less than 2 seconds.        Psychiatric: He has a normal mood and affect. His behavior is normal.   Nursing note and vitals reviewed.      Laboratory  CBC: Reviewed    Diagnostic Results:  Labs: Reviewed  MRI: Reviewed    ASSESSMENT/PLAN:     Return to clinic  Will discuss MRI at next visit  Discussed need for possible EGD EUS versus proceeding with laparoscopic ventral hernia repair.      PLAN:Plan     Return to clinic  Will discuss surgical planning at next visit         "

## 2020-05-12 ENCOUNTER — OFFICE VISIT (OUTPATIENT)
Dept: SURGERY | Facility: CLINIC | Age: 65
End: 2020-05-12
Payer: MEDICARE

## 2020-05-12 VITALS
RESPIRATION RATE: 16 BRPM | TEMPERATURE: 97 F | OXYGEN SATURATION: 95 % | HEART RATE: 61 BPM | HEIGHT: 74 IN | SYSTOLIC BLOOD PRESSURE: 99 MMHG | WEIGHT: 182 LBS | DIASTOLIC BLOOD PRESSURE: 64 MMHG | BODY MASS INDEX: 23.36 KG/M2

## 2020-05-12 DIAGNOSIS — K43.9 VENTRAL HERNIA WITHOUT OBSTRUCTION OR GANGRENE: Primary | ICD-10-CM

## 2020-05-12 DIAGNOSIS — D17.9 INTRAMUSCULAR LIPOMA: ICD-10-CM

## 2020-05-12 PROCEDURE — 99999 PR PBB SHADOW E&M-EST. PATIENT-LVL III: ICD-10-PCS | Mod: PBBFAC,,, | Performed by: SURGERY

## 2020-05-12 PROCEDURE — 99214 PR OFFICE/OUTPT VISIT, EST, LEVL IV, 30-39 MIN: ICD-10-PCS | Mod: S$PBB,,, | Performed by: SURGERY

## 2020-05-12 PROCEDURE — 99213 OFFICE O/P EST LOW 20 MIN: CPT | Mod: PBBFAC,PN | Performed by: SURGERY

## 2020-05-12 PROCEDURE — 99999 PR PBB SHADOW E&M-EST. PATIENT-LVL III: CPT | Mod: PBBFAC,,, | Performed by: SURGERY

## 2020-05-12 PROCEDURE — 99214 OFFICE O/P EST MOD 30 MIN: CPT | Mod: S$PBB,,, | Performed by: SURGERY

## 2020-05-14 NOTE — PROGRESS NOTES
History & Physical    SUBJECTIVE:     History of Present Illness:  Patient is a 64 y.o. male presents with a ventral hernia above his umbilicus.  States it has gradually gotten larger and is causing more discomfort now.  He had a CT scan performed which also showed a dilated common bile duct but had normal lab values.  He also was found to have a fatty density in his gluteal area approximately 14 cm total.  MRI was obtained which showed the dilated common bile duct but no filling defects.  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    Tumor marker labs were obtained to rule out any obvious pancreatic malignancy and all CEA  afP and CA 19-9 are normal.    I will call patient to discuss surgical planning.      Chief Complaint   Patient presents with    Results       Review of patient's allergies indicates:   Allergen Reactions    Ace inhibitors        Current Outpatient Medications   Medication Sig Dispense Refill    allopurinol (ZYLOPRIM) 100 MG tablet Take 1 tablet (100 mg total) by mouth once daily. Decreased dose 90 tablet 3    amLODIPine (NORVASC) 2.5 MG tablet Take 1 tablet (2.5 mg total) by mouth once daily. 90 tablet 3    carvediloL (COREG) 12.5 MG tablet Take 1 tablet (12.5 mg total) by mouth 2 (two) times daily with meals. 180 tablet 3    clopidogrel (PLAVIX) 75 mg tablet Take 75 mg by mouth once daily.      finasteride (PROSCAR) 5 mg tablet Take 5 mg by mouth once daily.      fluticasone propionate (FLONASE) 50 mcg/actuation nasal spray       glipiZIDE (GLUCOTROL) 5 MG tablet Take 1 tablet (5 mg total) by mouth 2 (two) times daily before meals. 180 tablet 3    [START ON 5/30/2020] HYDROcodone-acetaminophen (NORCO)  mg per tablet Take 1 tablet by mouth every 24 hours as needed for Pain. 30 tablet 0    HYDROcodone-acetaminophen (NORCO)  mg per tablet Take 1 tablet by mouth every 24 hours as needed for Pain. 30 tablet 0    [START ON 6/29/2020] HYDROcodone-acetaminophen (NORCO)   mg per tablet Take 1 tablet by mouth every 24 hours as needed for Pain. 30 tablet 0    irbesartan (AVAPRO) 300 MG tablet Take 1 tablet (300 mg total) by mouth every evening. 90 tablet 3    metFORMIN (GLUCOPHAGE) 500 MG tablet Take 2 tablets (1,000 mg total) by mouth 2 (two) times daily with meals. Increased dose 360 tablet 3    omeprazole (PRILOSEC) 20 MG capsule Take 20 mg by mouth once daily.      ondansetron (ZOFRAN) 4 MG tablet Take 1 tablet (4 mg total) by mouth every 6 (six) hours. 12 tablet 0    simvastatin (ZOCOR) 40 MG tablet Take 0.5 tablets (20 mg total) by mouth every evening. 90 tablet 3    SYMBICORT 160-4.5 mcg/actuation HFAA       VENTOLIN HFA 90 mcg/actuation inhaler        No current facility-administered medications for this visit.        Past Medical History:   Diagnosis Date    Anemia     Chronic prescription opiate use     Diabetes mellitus, type 2     Hepatitis C     Hyperlipidemia     Hypertension      History reviewed. No pertinent surgical history.  Family History   Problem Relation Age of Onset    Coronary artery disease Mother     Diabetes Father     Valvular heart disease Sister     Lung disease Sister     Diabetes Brother     No Known Problems Daughter     No Known Problems Son     Diabetes Brother      Social History     Tobacco Use    Smoking status: Current Every Day Smoker     Packs/day: 0.10     Types: Cigarettes    Smokeless tobacco: Never Used   Substance Use Topics    Alcohol use: Not on file    Drug use: No        Review of Systems:  Review of Systems   Constitutional: Negative for appetite change, fatigue, fever and unexpected weight change.   HENT: Negative for sore throat and trouble swallowing.    Eyes: Negative.    Respiratory: Negative for cough, shortness of breath and wheezing.    Cardiovascular: Negative for chest pain and leg swelling.   Gastrointestinal: Negative for abdominal distention, abdominal pain, blood in stool, constipation,  "diarrhea, nausea and vomiting.   Endocrine: Negative.    Genitourinary: Negative.    Musculoskeletal: Negative for back pain.   Skin: Negative.  Negative for rash.   Allergic/Immunologic: Negative.    Neurological: Negative.    Hematological: Negative.    Psychiatric/Behavioral: Negative for confusion.       OBJECTIVE:     Vital Signs (Most Recent)  Temp: 97.3 °F (36.3 °C) (05/12/20 0930)  Pulse: 61 (05/12/20 0930)  Resp: 16 (05/12/20 0930)  BP: 99/64 (05/12/20 0930)  SpO2: 95 % (05/12/20 0930)  6' 2" (1.88 m)  82.6 kg (181 lb 15.8 oz)     Physical Exam:  Physical Exam   Constitutional: He is oriented to person, place, and time. He appears well-developed and well-nourished.   HENT:   Head: Normocephalic and atraumatic.   Eyes: EOM are normal.   Neck: Normal range of motion.   Cardiovascular: Normal rate and normal heart sounds.   Pulmonary/Chest: Effort normal.   Abdominal: Soft. Bowel sounds are normal. He exhibits no distension. There is no tenderness.   Ventral hernia at umbilicus   Musculoskeletal: Normal range of motion.   Neurological: He is alert and oriented to person, place, and time.   Skin: Skin is warm and dry. Capillary refill takes less than 2 seconds.   Psychiatric: He has a normal mood and affect. His behavior is normal.   Nursing note and vitals reviewed.      Laboratory  CBC: Reviewed  CMP: Reviewed    Diagnostic Results:  MRI: Reviewed  14 x 8 cm mass on gluteus, ventral hernia    ASSESSMENT/PLAN:     64-year-old male with a reducible ventral hernia and a large soft tissue mass on his left gluteus intramuscular.  All of his tumor marker labs reviewed.  Schedule the patient for a laparoscopic ventral hernia repair as well as removal of this gluteal mass.       "

## 2020-05-18 ENCOUNTER — TELEPHONE (OUTPATIENT)
Dept: FAMILY MEDICINE | Facility: CLINIC | Age: 65
End: 2020-05-18

## 2020-05-18 DIAGNOSIS — M54.50 CHRONIC MIDLINE LOW BACK PAIN WITHOUT SCIATICA: ICD-10-CM

## 2020-05-18 DIAGNOSIS — G89.29 CHRONIC MIDLINE LOW BACK PAIN WITHOUT SCIATICA: ICD-10-CM

## 2020-05-18 DIAGNOSIS — F11.90 CHRONIC NARCOTIC USE: ICD-10-CM

## 2020-05-18 RX ORDER — HYDROCODONE BITARTRATE AND ACETAMINOPHEN 10; 325 MG/1; MG/1
1 TABLET ORAL EVERY 12 HOURS PRN
Qty: 40 TABLET | Refills: 0 | Status: ON HOLD | OUTPATIENT
Start: 2020-05-18 | End: 2020-06-08 | Stop reason: HOSPADM

## 2020-05-18 NOTE — TELEPHONE ENCOUNTER
----- Message from Taty Callaway LPN sent at 5/18/2020 10:52 AM CDT -----  Contact: self   Spoke with patient and he informed me that he had to double up on his medication for pain due to the pain in his rectum from a tumor.His next script refill is not schedule for refill until 5/30.  ----- Message -----  From: Cori Troncoso  Sent: 5/18/2020  10:42 AM CDT  To: Anca Mercado Staff    Refill:HYDROcodone-acetaminophen (NORCO)  mg per tablet      [facility] - [place], [place] - [address] [place] [address]  Phone: [phone] Fax: [phone]

## 2020-05-18 NOTE — TELEPHONE ENCOUNTER
He has a ventral hernia. And a buttock lipoma.    I sent in rx for #40 tablets instead of usual #30.

## 2020-05-19 NOTE — TELEPHONE ENCOUNTER
----- Message from Roselyn Cadena sent at 5/19/2020  1:14 PM CDT -----  Contact: pt  Type: RX Refill Request    Who Called:pt    Have you contacted your pharmacy:    Refill or New Rx:HYDROcodone-acetaminophen (NORCO)  mg per tablet - needs PA    RX Name and Strength:    How is the patient currently taking it? (ex. 1XDay):    Is this a 30 day or 90 day RX:    Preferred Pharmacy with phone number:[facility]    Local or Mail Order:    Ordering Provider:    Would the patient rather a call back or a response via My [facility]? call    Best Call Back Number:[phone] (home)       Additional Information:

## 2020-05-19 NOTE — TELEPHONE ENCOUNTER
[facility] and she informed me that PA is not needed and script is ready for . Patient notified.

## 2020-06-01 ENCOUNTER — TELEPHONE (OUTPATIENT)
Dept: SURGERY | Facility: CLINIC | Age: 65
End: 2020-06-01

## 2020-06-01 NOTE — TELEPHONE ENCOUNTER
----- Message from Molly Agarwal sent at 6/1/2020  8:21 AM CDT -----  Pt is calling to set up his surgery date and time and would like for the nurse to give him a call back

## 2020-06-01 NOTE — TELEPHONE ENCOUNTER
Returned call and spoke with patient. Surgery scheduled for Monday, 06/08/2020. Post-op appointment scheduled. All questions answered; patient voiced understanding.

## 2020-06-08 PROBLEM — K43.9 VENTRAL HERNIA WITHOUT OBSTRUCTION OR GANGRENE: Status: ACTIVE | Noted: 2020-06-08

## 2020-06-09 ENCOUNTER — TELEPHONE (OUTPATIENT)
Dept: SURGERY | Facility: CLINIC | Age: 65
End: 2020-06-09

## 2020-06-09 ENCOUNTER — TELEPHONE (OUTPATIENT)
Dept: FAMILY MEDICINE | Facility: CLINIC | Age: 65
End: 2020-06-09

## 2020-06-09 DIAGNOSIS — K42.9 UMBILICAL HERNIA WITHOUT OBSTRUCTION AND WITHOUT GANGRENE: ICD-10-CM

## 2020-06-09 DIAGNOSIS — G89.29 CHRONIC MIDLINE LOW BACK PAIN WITHOUT SCIATICA: Primary | ICD-10-CM

## 2020-06-09 DIAGNOSIS — M54.50 CHRONIC MIDLINE LOW BACK PAIN WITHOUT SCIATICA: Primary | ICD-10-CM

## 2020-06-09 RX ORDER — OXYCODONE AND ACETAMINOPHEN 10; 325 MG/1; MG/1
1 TABLET ORAL EVERY 6 HOURS PRN
Qty: 28 TABLET | Refills: 0 | Status: SHIPPED | OUTPATIENT
Start: 2020-06-09 | End: 2020-06-16

## 2020-06-09 NOTE — TELEPHONE ENCOUNTER
----- Message from Roselyn Cadena sent at 6/9/2020  1:37 PM CDT -----  Contact: pt  Type: Patient Call Back    Who called:pt    What is the request in detail:pt is calling in regards to pain medication. call    Can the clinic reply by [facility]?    Would the patient rather a call back or a response via [facility]? call    Best call back number: [phone] (home)         Additional Information:

## 2020-06-09 NOTE — TELEPHONE ENCOUNTER
Spoke with patient he states he had 2 surgeries on yesterday the pain medication he was given on yesterday is not working he says. Patient states he can't sleep with pain, he's crying, and would like something stronger. Patient states it worked prior to his surgeries but is not now. Please advise

## 2020-06-09 NOTE — TELEPHONE ENCOUNTER
----- Message from Ann Carlson sent at 6/9/2020 10:16 AM CDT -----  Contact: Patient [phone]  Type: Patient Call Back    Who called: Patient    What is the request in detail: Would like to speak to nurse to find out if he can get stronger pain medication after having surgery on yesterday, 06-08-20. Pt states he was given, HYDROcodone-acetaminophen (NORCO)  mg per tablet, by Dr Jed Dickerson, but it's not helping. States he called Dr Dickerson's office and the nurse told him to keep taking the pain meds that were prescribed. Pt states he's in pain, can hardly walk, and can't sleep. Need something stronger. Please call pt in regards to this.     Would the patient rather a call back or a response via [facility]? Call back    Best call back number: [phone]

## 2020-06-09 NOTE — TELEPHONE ENCOUNTER
Spoke with Meagan with [facility] informed her per provider he is aware of both rx's that pt received one was for his chronic pain other was for his pain after his surgery.Per  it is ok to fill both rx's.

## 2020-06-09 NOTE — TELEPHONE ENCOUNTER
----- Message from Denice George sent at 6/9/2020  8:09 AM CDT -----  Contact: pt @ [phone]  Asking to speak with someone in Dr. Dickerson's office regarding his pain, says the medication he has been taking is not working. Please call.

## 2020-06-09 NOTE — TELEPHONE ENCOUNTER
I sent in oxycodone (percocet), may take every 6 hours.  Do not take the hydrocodone while taking the oxycodone

## 2020-06-09 NOTE — TELEPHONE ENCOUNTER
"Returned call to the patient. He states the NORCO isn't working for his pain. He states the last time he took his pain medication was last night, and it didn't help. We discussed post-op pain and taking it as prescribed. Patient states he did not take it this morning and will not take it because "it does not work." Patient requesting something different to help with his pain. I let him know I will send a message to the doctor. Patient voiced understanding.   "

## 2020-06-11 ENCOUNTER — TELEPHONE (OUTPATIENT)
Dept: SURGERY | Facility: CLINIC | Age: 65
End: 2020-06-11

## 2020-06-11 NOTE — TELEPHONE ENCOUNTER
Returned call to the patient. He states he is having some clear discharge from his incision site which has since stopped draining. He denies redness, swelling, and tenderness to the site. Also denies fever. Patient states his incisions look good. I informed the patient that I find no concern for infection at this time, and some clear draining is normal. All questions answered; patient voiced understanding. Patient instructed to call back with any additional questions or concerns.

## 2020-06-11 NOTE — TELEPHONE ENCOUNTER
----- Message from Breann Reed sent at 6/11/2020  2:02 PM CDT -----  Contact: pt   Please call pt at [phone]    Patient is having left leg swelling since last night (no pain)    Also having a light bloody discharge from the surgical site (just started today)     Thank you

## 2020-06-16 ENCOUNTER — TELEPHONE (OUTPATIENT)
Dept: SURGERY | Facility: CLINIC | Age: 65
End: 2020-06-16

## 2020-06-16 DIAGNOSIS — K42.9 UMBILICAL HERNIA WITHOUT OBSTRUCTION AND WITHOUT GANGRENE: ICD-10-CM

## 2020-06-16 RX ORDER — OXYCODONE AND ACETAMINOPHEN 10; 325 MG/1; MG/1
1 TABLET ORAL EVERY 6 HOURS PRN
Qty: 28 TABLET | Refills: 0 | OUTPATIENT
Start: 2020-06-16 | End: 2020-06-23

## 2020-06-16 NOTE — TELEPHONE ENCOUNTER
----- Message from Katarzyna Yates sent at 6/16/2020  9:32 AM CDT -----  Regarding: REFILL  Contact: Patient  Type: RX Refill Request    Who Called: Patient     Have you contacted your pharmacy:    Refill or New Rx: Refill     RX Name and Strength: oxyCODONE-acetaminophen (PERCOCET)  mg per tablet    How is the patient currently taking it? (ex. 1XDay)    Is this a 30 day or 90 day RX:    Preferred Pharmacy with phone number:   [facility] - [place], [place] - [address] [place] [address]  Phone: [phone] Fax: [phone]        Local or Mail Order: Local     Ordering Provider: Dr. March     Would the patient rather a call back or a response via [facility]? Call back     Best Call Back Number: [phone]

## 2020-06-16 NOTE — TELEPHONE ENCOUNTER
He recently filled 2 prescriptions - one from his surgeon and 1 from me    6/8 norco 10 mg #25 from surgery  6/9 percocet 10 mg #28 from me    Too soon for refill. If he is still in a lot of pain needs to f/u with surgery to make sure it's healing right.

## 2020-06-21 ENCOUNTER — NURSE TRIAGE (OUTPATIENT)
Dept: ADMINISTRATIVE | Facility: CLINIC | Age: 65
End: 2020-06-21

## 2020-06-21 NOTE — TELEPHONE ENCOUNTER
Post procedure follow up call, patient denies any SOB, Cough or Fever. Reports no post-op complications but admits to not knowing how to care for incision site. Patient stated that he has not cleaned the area because he did not know if he could take a shower.Provided basic site care. Routed message to Surgeon's office for appropriate follow up.     Reason for Disposition   [1] Follow-up call to recent contact AND [2] information only call, no triage required    Additional Information   Negative: [1] Caller is not with the adult (patient) AND [2] reporting urgent symptoms   Negative: Lab result questions   Negative: Medication questions   Negative: Caller can't be reached by phone   Negative: Caller has already spoken to PCP or another triager   Negative: RN needs further essential information from caller in order to complete triage   Negative: Requesting regular office appointment   Negative: [1] Caller requesting NON-URGENT health information AND [2] PCP's office is the best resource   Negative: Health Information question, no triage required and triager able to answer question   Negative: General information question, no triage required and triager able to answer question   Negative: Question about upcoming scheduled test, no triage required and triager able to answer question   Negative: [1] Caller is not with the adult (patient) AND [2] probable NON-URGENT symptoms    Protocols used: INFORMATION ONLY CALL-A-

## 2020-06-22 ENCOUNTER — OFFICE VISIT (OUTPATIENT)
Dept: SURGERY | Facility: CLINIC | Age: 65
End: 2020-06-22
Payer: MEDICARE

## 2020-06-22 VITALS
RESPIRATION RATE: 18 BRPM | SYSTOLIC BLOOD PRESSURE: 104 MMHG | HEIGHT: 74 IN | WEIGHT: 172.81 LBS | HEART RATE: 81 BPM | DIASTOLIC BLOOD PRESSURE: 73 MMHG | BODY MASS INDEX: 22.18 KG/M2 | OXYGEN SATURATION: 98 %

## 2020-06-22 DIAGNOSIS — Z98.890 POST-OPERATIVE STATE: Primary | ICD-10-CM

## 2020-06-22 PROCEDURE — 99999 PR PBB SHADOW E&M-EST. PATIENT-LVL V: CPT | Mod: PBBFAC,,, | Performed by: SURGERY

## 2020-06-22 PROCEDURE — 99215 OFFICE O/P EST HI 40 MIN: CPT | Mod: PBBFAC,PN | Performed by: SURGERY

## 2020-06-22 PROCEDURE — 99999 PR PBB SHADOW E&M-EST. PATIENT-LVL V: ICD-10-PCS | Mod: PBBFAC,,, | Performed by: SURGERY

## 2020-06-22 PROCEDURE — 99024 PR POST-OP FOLLOW-UP VISIT: ICD-10-PCS | Mod: POP,,, | Performed by: SURGERY

## 2020-06-22 PROCEDURE — 99024 POSTOP FOLLOW-UP VISIT: CPT | Mod: POP,,, | Performed by: SURGERY

## 2020-06-25 NOTE — PROGRESS NOTES
"Paulino Meadows is a 64 y.o. male patient.   Two weeks postop status post laparoscopic incisional hernia repair.    Patient had some postop discomfort as umbilicus but has improved significantly now.    His pain is much better control.    Still has some occasional episodes but overall is doing better.    Tolerating diet  Having normal bowel movements.      Patient also had a large, intramuscular Lipoma removed from his right posterior hip area and this incision is well-healed.        No diagnosis found.  Past Medical History:   Diagnosis Date    Anemia     BPH (benign prostatic hyperplasia)     Chronic low back pain     Chronic prescription opiate use     COPD (chronic obstructive pulmonary disease)     Coronary artery disease     Diabetes mellitus, type 2     GERD (gastroesophageal reflux disease)     Hepatitis C     Hyperlipidemia     Hypertension      No past surgical history pertinent negatives on file.  Scheduled Meds:  Continuous Infusions:  PRN Meds:    Review of patient's allergies indicates:   Allergen Reactions    Ace inhibitors      There are no hospital problems to display for this patient.    Blood pressure 104/73, pulse 81, resp. rate 18, height 6' 2" (1.88 m), weight 78.4 kg (172 lb 13.5 oz), SpO2 98 %.    Subjective:   Diet: Adequate intake.    Activity level: Returning to normal.    Pain control: Well controlled.      Objective:  Vital signs (most recent): Blood pressure 104/73, pulse 81, resp. rate 18, height 6' 2" (1.88 m), weight 78.4 kg (172 lb 13.5 oz), SpO2 98 %.  General appearance: Comfortable.    Lungs:  Normal effort.    Heart: Normal rate.    Abdomen: Abdomen is soft.    Bowel sounds:  Bowel sounds are normal.    Tenderness: There is no abdominal tenderness tenderness.    Wound:  Clean.       Assessment:   Condition: In stable condition.        2 weeks status post laparoscopic incisional hernia repair, gluteal lipoma.    Patient doing well.    Return to clinic p.r.n..       "     Jed Dickerson MD  6/24/2020

## 2020-06-30 DIAGNOSIS — M54.50 CHRONIC MIDLINE LOW BACK PAIN WITHOUT SCIATICA: ICD-10-CM

## 2020-06-30 DIAGNOSIS — G89.29 CHRONIC MIDLINE LOW BACK PAIN WITHOUT SCIATICA: ICD-10-CM

## 2020-06-30 DIAGNOSIS — F11.90 CHRONIC NARCOTIC USE: ICD-10-CM

## 2020-06-30 RX ORDER — HYDROCODONE BITARTRATE AND ACETAMINOPHEN 10; 325 MG/1; MG/1
1 TABLET ORAL
Qty: 30 TABLET | Refills: 0 | Status: SHIPPED | OUTPATIENT
Start: 2020-06-30 | End: 2020-07-31 | Stop reason: SDUPTHER

## 2020-06-30 RX ORDER — HYDROCODONE BITARTRATE AND ACETAMINOPHEN 10; 325 MG/1; MG/1
1 TABLET ORAL EVERY 6 HOURS PRN
Qty: 25 TABLET | Refills: 0 | Status: CANCELLED | OUTPATIENT
Start: 2020-07-31 | End: 2020-08-31

## 2020-06-30 NOTE — TELEPHONE ENCOUNTER
----- Message from Roselyn Cadena sent at 6/30/2020 10:06 AM CDT -----  Regarding: refill  Type: RX Refill Request    Who Called:pt    Have you contacted your pharmacy:    Refill or New Rx:HYDROcodone-acetaminophen (NORCO)  mg per tablet    RX Name and Strength:    How is the patient currently taking it? (ex. 1XDay):    Is this a 30 day or 90 day RX:    Preferred Pharmacy with phone number:  [facility] - [place], [place] - [address] [place] [address]  Phone: [phone] Fax: [phone]        Local or Mail Order:    Ordering Provider:    Would the patient rather a call back or a response via [facility]? call    Best Call Back Number:[phone] (home)       Additional Information:

## 2020-07-31 ENCOUNTER — TELEPHONE (OUTPATIENT)
Dept: FAMILY MEDICINE | Facility: CLINIC | Age: 65
End: 2020-07-31

## 2020-07-31 DIAGNOSIS — F11.90 CHRONIC NARCOTIC USE: Primary | ICD-10-CM

## 2020-07-31 DIAGNOSIS — M54.50 CHRONIC MIDLINE LOW BACK PAIN WITHOUT SCIATICA: ICD-10-CM

## 2020-07-31 DIAGNOSIS — G89.29 CHRONIC MIDLINE LOW BACK PAIN WITHOUT SCIATICA: ICD-10-CM

## 2020-07-31 DIAGNOSIS — F11.90 CHRONIC NARCOTIC USE: ICD-10-CM

## 2020-07-31 RX ORDER — HYDROCODONE BITARTRATE AND ACETAMINOPHEN 10; 325 MG/1; MG/1
1 TABLET ORAL
Qty: 30 TABLET | Refills: 0 | Status: SHIPPED | OUTPATIENT
Start: 2020-07-31 | End: 2020-08-06 | Stop reason: SDUPTHER

## 2020-07-31 RX ORDER — HYDROCODONE BITARTRATE AND ACETAMINOPHEN 5; 325 MG/1; MG/1
2 TABLET ORAL
Qty: 60 TABLET | Refills: 0 | Status: SHIPPED | OUTPATIENT
Start: 2020-07-31 | End: 2020-08-24 | Stop reason: SDUPTHER

## 2020-07-31 RX ORDER — HYDROCODONE BITARTRATE AND ACETAMINOPHEN 10; 325 MG/1; MG/1
1 TABLET ORAL
Qty: 30 TABLET | Refills: 0 | Status: SHIPPED | OUTPATIENT
Start: 2020-07-31 | End: 2020-07-31 | Stop reason: SDUPTHER

## 2020-07-31 RX ORDER — HYDROCODONE BITARTRATE AND ACETAMINOPHEN 10; 325 MG/1; MG/1
1 TABLET ORAL EVERY 6 HOURS PRN
Qty: 25 TABLET | Refills: 0 | Status: CANCELLED | OUTPATIENT
Start: 2020-07-31

## 2020-07-31 NOTE — TELEPHONE ENCOUNTER
rx sent needs OV (I had previously told him every 6 months but actually needs to be every 3 months)

## 2020-07-31 NOTE — TELEPHONE ENCOUNTER
Spoke with Ashok with [facility] she states there is no NORCO in stock.Pt provided info for pharmacy he wants rx sent.

## 2020-07-31 NOTE — TELEPHONE ENCOUNTER
Spoke with pt informed him we were contacted by pharmacy and informed NORCO was out stock.Asked pt if he had another pharmacy he would like rx sent to.Pt states he would rather a different pain medication until pharmacy gets medication in stock.Please advise.

## 2020-07-31 NOTE — TELEPHONE ENCOUNTER
----- Message from Alcides Moore sent at 7/31/2020  9:39 AM CDT -----  Can the clinic reply in [facility]:     Please refill the medication(s) listed below. The patient can be reached at this phone number once it is called into the pharmacy.   [phone]    Medication #1     HYDROcodone-acetaminophen (NORCO)  mg per tablet    Medication #2    Preferred Pharmacy: [facility] - [place], [place] - [address]

## 2020-07-31 NOTE — TELEPHONE ENCOUNTER
----- Message from Roselyn Cadena sent at 7/31/2020  1:39 PM CDT -----  Regarding: refill  Type: RX Refill Request    Who Called: pt    Have you contacted your pharmacy:    Refill or New Rx:HYDROcodone-acetaminophen (NORCO)  mg per tablet    RX Name and Strength:    How is the patient currently taking it? (ex. 1XDay):    Is this a 30 day or 90 day RX:    Preferred Pharmacy with phone number:  [facility] - [place], [place] - [address] [place] [address]  Phone: [phone] Fax: [phone]        Local or Mail Order:    Ordering Provider:    Would the patient rather a call back or a response via [facility]? call    Best Call Back Number:[phone] (home)       Additional Information:

## 2020-08-06 ENCOUNTER — OFFICE VISIT (OUTPATIENT)
Dept: FAMILY MEDICINE | Facility: CLINIC | Age: 65
End: 2020-08-06
Payer: MEDICARE

## 2020-08-06 VITALS
OXYGEN SATURATION: 96 % | DIASTOLIC BLOOD PRESSURE: 72 MMHG | SYSTOLIC BLOOD PRESSURE: 126 MMHG | HEIGHT: 74 IN | HEART RATE: 80 BPM | TEMPERATURE: 98 F | WEIGHT: 176 LBS | BODY MASS INDEX: 22.59 KG/M2

## 2020-08-06 DIAGNOSIS — G89.29 CHRONIC MIDLINE LOW BACK PAIN WITHOUT SCIATICA: ICD-10-CM

## 2020-08-06 DIAGNOSIS — M54.50 CHRONIC MIDLINE LOW BACK PAIN WITHOUT SCIATICA: ICD-10-CM

## 2020-08-06 DIAGNOSIS — Z23 NEED FOR SHINGLES VACCINE: ICD-10-CM

## 2020-08-06 DIAGNOSIS — F11.90 CHRONIC NARCOTIC USE: Primary | ICD-10-CM

## 2020-08-06 DIAGNOSIS — R80.9 CONTROLLED TYPE 2 DIABETES MELLITUS WITH MICROALBUMINURIA, WITHOUT LONG-TERM CURRENT USE OF INSULIN: ICD-10-CM

## 2020-08-06 DIAGNOSIS — E11.29 CONTROLLED TYPE 2 DIABETES MELLITUS WITH MICROALBUMINURIA, WITHOUT LONG-TERM CURRENT USE OF INSULIN: ICD-10-CM

## 2020-08-06 DIAGNOSIS — J44.9 CHRONIC OBSTRUCTIVE PULMONARY DISEASE, UNSPECIFIED COPD TYPE: ICD-10-CM

## 2020-08-06 PROCEDURE — 99214 OFFICE O/P EST MOD 30 MIN: CPT | Mod: PBBFAC,PO,25 | Performed by: INTERNAL MEDICINE

## 2020-08-06 PROCEDURE — 99214 PR OFFICE/OUTPT VISIT, EST, LEVL IV, 30-39 MIN: ICD-10-PCS | Mod: S$PBB,,, | Performed by: INTERNAL MEDICINE

## 2020-08-06 PROCEDURE — 99999 PR PBB SHADOW E&M-EST. PATIENT-LVL IV: CPT | Mod: PBBFAC,,, | Performed by: INTERNAL MEDICINE

## 2020-08-06 PROCEDURE — 90750 HZV VACC RECOMBINANT IM: CPT | Mod: PBBFAC,PO

## 2020-08-06 PROCEDURE — 99214 OFFICE O/P EST MOD 30 MIN: CPT | Mod: S$PBB,,, | Performed by: INTERNAL MEDICINE

## 2020-08-06 PROCEDURE — 99999 PR PBB SHADOW E&M-EST. PATIENT-LVL IV: ICD-10-PCS | Mod: PBBFAC,,, | Performed by: INTERNAL MEDICINE

## 2020-08-06 RX ORDER — HYDROCODONE BITARTRATE AND ACETAMINOPHEN 10; 325 MG/1; MG/1
1 TABLET ORAL
Qty: 30 TABLET | Refills: 0 | Status: SHIPPED | OUTPATIENT
Start: 2020-08-31 | End: 2020-08-24 | Stop reason: SDUPTHER

## 2020-08-06 RX ORDER — HYDROCODONE BITARTRATE AND ACETAMINOPHEN 10; 325 MG/1; MG/1
1 TABLET ORAL
Qty: 30 TABLET | Refills: 0 | Status: SHIPPED | OUTPATIENT
Start: 2020-10-30 | End: 2020-11-06 | Stop reason: SDUPTHER

## 2020-08-06 RX ORDER — HYDROCODONE BITARTRATE AND ACETAMINOPHEN 10; 325 MG/1; MG/1
1 TABLET ORAL
Qty: 30 TABLET | Refills: 0 | Status: SHIPPED | OUTPATIENT
Start: 2020-09-30 | End: 2020-09-30 | Stop reason: SDUPTHER

## 2020-08-06 NOTE — PROGRESS NOTES
This note was created by combination of typed  and M-Modal dictation.  Transcription errors may be present.  If there are any questions, please contact me.    Assessment and Plan:   Chronic narcotic use  Chronic midline low back pain without sciatica; by report, 9/8/2011 MRI lumbar spine schmorl's nodes and ligamentous hypertrophy, root impingment noted  -stable on current regimen,  queried no aberrancy.  Postdated prescriptions sent.  Intensive monitoring of high risk medication.  -     HYDROcodone-acetaminophen (NORCO)  mg per tablet; Take 1 tablet by mouth every 24 hours as needed for Pain.  Dispense: 30 tablet; Refill: 0  -     HYDROcodone-acetaminophen (NORCO)  mg per tablet; Take 1 tablet by mouth every 24 hours as needed for Pain.  Dispense: 30 tablet; Refill: 0  -     HYDROcodone-acetaminophen (NORCO)  mg per tablet; Take 1 tablet by mouth every 24 hours as needed for Pain.  Dispense: 30 tablet; Refill: 0    Controlled type 2 diabetes mellitus with microalbuminuria, without long-term current use of insulin  -A1c is okay.  I am not sure what medications he is taking currently.  It is possible he is taking metformin, glipizide, and Jardiance.  I have asked him to verify his medications and his doses.  [facility]    Chronic obstructive pulmonary disease, unspecified COPD type  -it is unclear to me if he is taking Symbicort.  I have asked him to verify his medications.  Stable.    Need for shingles vaccine  -1st dose of Shingrix today.  History of Zostavax.  -     (In Office Administered) Zoster Recombinant Vaccine    Medications Discontinued During This Encounter   Medication Reason    HYDROcodone-acetaminophen (NORCO)  mg per tablet Reorder       meds sent this encounter:  Medications Ordered This Encounter   Medications    HYDROcodone-acetaminophen (NORCO)  mg per tablet     Sig: Take 1 tablet by mouth every 24 hours as needed for Pain.      Dispense:  30 tablet     Refill:  0     Quantity prescribed more than 7 day supply? Yes, quantity medically necessary    HYDROcodone-acetaminophen (NORCO)  mg per tablet     Sig: Take 1 tablet by mouth every 24 hours as needed for Pain.     Dispense:  30 tablet     Refill:  0     Quantity prescribed more than 7 day supply? Yes, quantity medically necessary    HYDROcodone-acetaminophen (NORCO)  mg per tablet     Sig: Take 1 tablet by mouth every 24 hours as needed for Pain.     Dispense:  30 tablet     Refill:  0     Quantity prescribed more than 7 day supply? Yes, quantity medically necessary       Follow Up: No follow-ups on file.  Follow-up 3 months chronic narcotic    Subjective:     Chief Complaint   Patient presents with    Back Pain       HPI  Paulino is a 64 y.o. male, last appointment with this clinic was 4/17/2020.    S/p lap incisional hernia repair    Through care everywhere tab I can see [facility] documents [facility].  Has a list of medications but it is unclear whether this is a running history of these are the most current medicines.  I asked the patient about this.  He does not know the names of his medicines.  Takes a whole pill of his medicines except for 1 pill which he takes a half tablet of.    [facility] list says  emagliflozin 25 1/2 tablet (jardiance), glipizide 5 b.i.d.,metformin 1000 BID  Omeprazole 20, 2 daily  Propranolol 20 tid  Simvastatin 40, 1/2 tablet changed to rosuvastatin 40 mg 1/2 tablet  Finasteride 5  plavix 75  Losartan 100  Allopurinol 300    Not taking propranolol  I have him on irbesartan and not losartan  I have him on allopurinol 100 not 300  I do not give him jardiance, never rx'd by me.  I have him on simvastatin 20    I have him on coreg, not on [facility] list    [facility] but that was [facility] because the corona virus pandemic and he reports that he is scheduled to see them upcoming.  So no repeat imaging of the liver since his  last visit with me.    10/2/[facility] 7.4   TG 93 HDL 29 LDL 59    It sounds like he goes to the resident clinic so he does not have a steady primary care doctor there.    No issues with the pain medicine.  Takes it regularly.  Once a day.  I temporally increased it after his hernia surgery but now back down to previous dose.  Denies issues.  Finds it effective.     queried, no aberrancy, last filled 8/1    Patient Care Team:  Rogelio March MD as PCP - General (Internal Medicine)  Rogelio March MD as PCP - MSSP Attributed  Fermin Lee Jr., MD as Consulting Physician (Family Medicine)  Lashon Garcia MA as Care Coordinator  [facility]    Patient Active Problem List    Diagnosis Date Noted    Ventral hernia without obstruction or gangrene 06/08/2020    Anemia of chronic disease on labs 9/2019 09/11/2019    GERD (gastroesophageal reflux disease) 05/14/2019    COPD (chronic obstructive pulmonary disease) with reported hx of abn PFTs 05/14/2019    Primary osteoarthritis of both knees hx of injection 05/14/2019    History of hepatitis C s/p treatment per pt.  [facility], reported MRI liver 8/2018 08/29/2018    Idiopathic gout of multiple sites 11/24/2017    Chronic midline low back pain without sciatica; by report, 9/8/2011 MRI lumbar spine schmorl's nodes and ligamentous hypertrophy, root impingment noted 10/24/2017    Chronic narcotic use 10/24/2017    Essential hypertension 10/24/2017    Controlled type 2 diabetes mellitus with microalbuminuria, without long-term current use of insulin 10/24/2017    Coronary artery disease involving native coronary artery of native heart without angina pectoris 10/24/2017    Benign prostatic hyperplasia without lower urinary tract symptoms 10/24/2017    Smoker 10/24/2017       PAST MEDICAL HISTORY:  Past Medical History:   Diagnosis Date    Anemia     BPH (benign prostatic hyperplasia)     Chronic low back pain      Chronic prescription opiate use     COPD (chronic obstructive pulmonary disease)     Coronary artery disease     Diabetes mellitus, type 2     GERD (gastroesophageal reflux disease)     Hepatitis C     Hyperlipidemia     Hypertension        PAST SURGICAL HISTORY:  Past Surgical History:   Procedure Laterality Date    BUTTOCK MASS EXCISION Left 06/08/2020    BUTTOCK MASS EXCISION Left 6/8/2020    Procedure: EXCISION, MASS, BUTTOCK;  Surgeon: Jed Dickerson MD;  Location: [facility];  Service: General;  Laterality: Left;    COLONOSCOPY      CORONARY ANGIOPLASTY WITH STENT PLACEMENT  2010    HERNIA REPAIR      LAPAROSCOPIC REPAIR OF INCARCERATED VENTRAL HERNIA  06/08/2020    with mesh    LAPAROSCOPIC REPAIR OF INCARCERATED VENTRAL HERNIA N/A 6/8/2020    Procedure: REPAIR, HERNIA, VENTRAL, INCARCERATED, LAPAROSCOPIC;  Surgeon: Jed Dickerson MD;  Location: [facility];  Service: General;  Laterality: N/A;  MOE VIDEO CONFIRMED 6/5/DME    LIVER BIOPSY         SOCIAL HISTORY:  Social History     Socioeconomic History    Marital status: Single     Spouse name: Not on file    Number of children: Not on file    Years of education: Not on file    Highest education level: Not on file   Occupational History    Occupation: disabled - orthopedic   Social Needs    Financial resource strain: Not on file    Food insecurity     Worry: Not on file     Inability: Not on file    Transportation needs     Medical: Not on file     Non-medical: Not on file   Tobacco Use    Smoking status: Current Every Day Smoker     Packs/day: 0.25     Types: Cigarettes    Smokeless tobacco: Never Used   Substance and Sexual Activity    Alcohol use: Never     Frequency: Never    Drug use: No    Sexual activity: Yes   Lifestyle    Physical activity     Days per week: Not on file     Minutes per session: Not on file    Stress: Not at all   Relationships    Social connections     Talks on phone: Not on file      Gets together: Not on file     Attends Spiritism service: Not on file     Active member of club or organization: Not on file     Attends meetings of clubs or organizations: Not on file     Relationship status: Not on file   Other Topics Concern    Not on file   Social History Narrative    Not on file       ALLERGIES AND MEDICATIONS: updated and reviewed.  Review of patient's allergies indicates:   Allergen Reactions    Ace inhibitors     Codeine        Medication List with Changes/Refills   Current Medications    ALLOPURINOL (ZYLOPRIM) 100 MG TABLET    Take 1 tablet (100 mg total) by mouth once daily. Decreased dose    AMLODIPINE (NORVASC) 2.5 MG TABLET    Take 1 tablet (2.5 mg total) by mouth once daily.    CARVEDILOL (COREG) 12.5 MG TABLET    Take 1 tablet (12.5 mg total) by mouth 2 (two) times daily with meals.    CLOPIDOGREL (PLAVIX) 75 MG TABLET    Take 75 mg by mouth once daily.    FINASTERIDE (PROSCAR) 5 MG TABLET    Take 5 mg by mouth once daily.    FLUTICASONE PROPIONATE (FLONASE) 50 MCG/ACTUATION NASAL SPRAY        GLIPIZIDE (GLUCOTROL) 5 MG TABLET    Take 1 tablet (5 mg total) by mouth 2 (two) times daily before meals.    HYDROCODONE-ACETAMINOPHEN (NORCO)  MG PER TABLET    Take 1 tablet by mouth every 24 hours as needed for Pain.    HYDROCODONE-ACETAMINOPHEN (NORCO) 5-325 MG PER TABLET    Take 2 tablets by mouth every 24 hours as needed for Pain.    IRBESARTAN (AVAPRO) 300 MG TABLET    Take 1 tablet (300 mg total) by mouth every evening.    METFORMIN (GLUCOPHAGE) 500 MG TABLET    Take 2 tablets (1,000 mg total) by mouth 2 (two) times daily with meals. Increased dose    OMEPRAZOLE (PRILOSEC) 20 MG CAPSULE    Take 20 mg by mouth once daily.    ONDANSETRON (ZOFRAN) 4 MG TABLET    Take 1 tablet (4 mg total) by mouth every 6 (six) hours.    SIMVASTATIN (ZOCOR) 40 MG TABLET    Take 0.5 tablets (20 mg total) by mouth every evening.    SYMBICORT 160-4.5 MCG/ACTUATION HFAA        VENTOLIN HFA 90  "MCG/ACTUATION INHALER            Review of Systems   All other systems reviewed and are negative.      Objective:   Physical Exam   Vitals:    08/06/20 1300   BP: 126/72   BP Location: Right arm   Patient Position: Sitting   BP Method: Medium (Manual)   Pulse: 80   Temp: 97.9 °F (36.6 °C)   TempSrc: Temporal   SpO2: 96%   Weight: 79.8 kg (176 lb)   Height: 6' 2" (1.88 m)    Body mass index is 22.6 kg/m².  Weight: 79.8 kg (176 lb)   Height: 6' 2" (188 cm)     Physical Exam  Constitutional:       General: He is not in acute distress.     Appearance: He is well-developed.   Cardiovascular:      Rate and Rhythm: Normal rate and regular rhythm.      Heart sounds: Normal heart sounds. No murmur.   Pulmonary:      Effort: Pulmonary effort is normal.      Breath sounds: Normal breath sounds.   Musculoskeletal: Normal range of motion.      Right lower leg: No edema.      Left lower leg: No edema.   Skin:     General: Skin is warm and dry.   Neurological:      Mental Status: He is alert and oriented to person, place, and time.      Coordination: Coordination normal.   Psychiatric:         Behavior: Behavior normal.         Thought Content: Thought content normal.         "

## 2020-08-24 ENCOUNTER — TELEPHONE (OUTPATIENT)
Dept: FAMILY MEDICINE | Facility: CLINIC | Age: 65
End: 2020-08-24

## 2020-08-24 DIAGNOSIS — G89.29 CHRONIC MIDLINE LOW BACK PAIN WITHOUT SCIATICA: ICD-10-CM

## 2020-08-24 DIAGNOSIS — M54.50 CHRONIC MIDLINE LOW BACK PAIN WITHOUT SCIATICA: ICD-10-CM

## 2020-08-24 DIAGNOSIS — F11.90 CHRONIC NARCOTIC USE: ICD-10-CM

## 2020-08-24 DIAGNOSIS — I10 ESSENTIAL HYPERTENSION: ICD-10-CM

## 2020-08-24 RX ORDER — AMLODIPINE BESYLATE 2.5 MG/1
2.5 TABLET ORAL DAILY
Qty: 90 TABLET | Refills: 3 | Status: SHIPPED | OUTPATIENT
Start: 2020-08-24 | End: 2022-07-26

## 2020-08-24 RX ORDER — HYDROCODONE BITARTRATE AND ACETAMINOPHEN 10; 325 MG/1; MG/1
1 TABLET ORAL
Qty: 30 TABLET | Refills: 0 | Status: SHIPPED | OUTPATIENT
Start: 2020-08-24 | End: 2020-09-23

## 2020-08-24 RX ORDER — HYDROCODONE BITARTRATE AND ACETAMINOPHEN 10; 325 MG/1; MG/1
1 TABLET ORAL
Qty: 30 TABLET | Refills: 0 | Status: SHIPPED | OUTPATIENT
Start: 2020-08-31 | End: 2020-08-24 | Stop reason: SDUPTHER

## 2020-08-24 RX ORDER — HYDROCODONE BITARTRATE AND ACETAMINOPHEN 5; 325 MG/1; MG/1
2 TABLET ORAL
Qty: 60 TABLET | Refills: 0 | Status: SHIPPED | OUTPATIENT
Start: 2020-08-24 | End: 2020-08-24 | Stop reason: ALTCHOICE
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
## 2020-08-24 NOTE — TELEPHONE ENCOUNTER
----- Message from Lina Collazo sent at 8/24/2020 10:19 AM CDT -----  Regarding: Self/  [phone]  Type: Patient Call Back    Who called:  Patient    What is the request in detail:  Patient stated his medication HYDROcodone-acetaminophen (NORCO)  mg per tablet.  He was given the 5 mg instead of 10mg.  He need the 10mg called in please.  Thank you    Would the patient rather a call back or a response via [facility]?   Call back    Best call back number:  [phone]

## 2020-08-24 NOTE — TELEPHONE ENCOUNTER
Spoke with the patient and inform him the 2 Rx were sent to [facility].  Patient verbalized understandings.

## 2020-08-24 NOTE — TELEPHONE ENCOUNTER
Spoke with the patient and told him the [place] has been sent to [facility].  Patient verbalized understandings.

## 2020-09-25 ENCOUNTER — TELEPHONE (OUTPATIENT)
Dept: FAMILY MEDICINE | Facility: CLINIC | Age: 65
End: 2020-09-25

## 2020-09-25 NOTE — TELEPHONE ENCOUNTER
----- Message from Miranda Lee sent at 9/25/2020  9:36 AM CDT -----  Type: RX Refill Request    Who Called:  Self     Have you contacted your pharmacy: no     Refill or New Rx: Refill     RX Name and Strength:HYDROcodone-acetaminophen (NORCO)  mg per tablet    Preferred Pharmacy with phone number:[facility] - [place], [place] - [address] [phone] (Phone)  [phone] (Fax)        Local or Mail Order: local     Ordering Provider: Dr. March    Would the patient rather a call back or a response via [facility]?  Call     Best Call Back Number:[phone] (home)

## 2020-09-30 DIAGNOSIS — M54.50 CHRONIC MIDLINE LOW BACK PAIN WITHOUT SCIATICA: ICD-10-CM

## 2020-09-30 DIAGNOSIS — F11.90 CHRONIC NARCOTIC USE: ICD-10-CM

## 2020-09-30 DIAGNOSIS — G89.29 CHRONIC MIDLINE LOW BACK PAIN WITHOUT SCIATICA: ICD-10-CM

## 2020-09-30 RX ORDER — HYDROCODONE BITARTRATE AND ACETAMINOPHEN 10; 325 MG/1; MG/1
1 TABLET ORAL
Qty: 30 TABLET | Refills: 0 | Status: SHIPPED | OUTPATIENT
Start: 2020-09-30 | End: 2020-10-30

## 2020-10-23 ENCOUNTER — TELEPHONE (OUTPATIENT)
Dept: FAMILY MEDICINE | Facility: CLINIC | Age: 65
End: 2020-10-23

## 2020-10-23 NOTE — TELEPHONE ENCOUNTER
Spoke with patient and he informed me that he is having neck and shoulder pains and it's making it hard for him to sleep. Informed patient at the earliest that I can get him in with his provider is 10/27/20. Next availability for another provider in the clinic want be until 10/26/20. Patient opt out to be seen in urgent care today.

## 2020-10-23 NOTE — TELEPHONE ENCOUNTER
----- Message from Sanjuana Hernandez, Patient Care Assistant sent at 10/23/2020  8:29 AM CDT -----  Name of Who is Calling: TRIPP CISNEROS [[id number]]    What is the request in detail:Requesting Rx for muscle spasms.  Please contact to further discuss and advise      Can the clinic reply by [facility]: No    [facility]   [phone]     [facility] - [place], [place] - [address]

## 2020-10-26 LAB
CHOLEST SERPL-MSCNC: 72 MG/DL (ref 0–200)
CREATININE RANDOM URINE: 112 MG/DL
HBA1C MFR BLD: 7.3 % (ref 4–6)
HDLC SERPL-MCNC: 30 MG/DL
LDL/HDL RATIO: 29
LDLC SERPL CALC-MCNC: 31 MG/DL
MICROALBUMIN URINE: 1.3
MICROALBUMIN/CREATININE RATIO: 11.6 UG/MG
TRIGLYCERIDE (LIPID PAN): 57

## 2020-10-28 ENCOUNTER — TELEPHONE (OUTPATIENT)
Dept: FAMILY MEDICINE | Facility: CLINIC | Age: 65
End: 2020-10-28
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
## 2020-11-05 ENCOUNTER — TELEPHONE (OUTPATIENT)
Dept: FAMILY MEDICINE | Facility: CLINIC | Age: 65
End: 2020-11-05

## 2020-11-05 NOTE — TELEPHONE ENCOUNTER
----- Message from Ann Carlson sent at 11/5/2020  8:19 AM CST -----  Contact: Patient [phone]  .Name of Caller Patient    Reason for Visit/Symptoms Check up  Best Contact Number or Confirm if Mychart Preferred [phone]  Preferred Date/Time of Appointment Friday, 11-06-20, around 1:30 pm  Interested in Virtual Visit (yes/no) No  Additional Information Patient will be in for 1:30 for an injection and would like to see Dr March for a check up, due to travelling so far. Please call pt.

## 2020-11-06 ENCOUNTER — OFFICE VISIT (OUTPATIENT)
Dept: FAMILY MEDICINE | Facility: CLINIC | Age: 65
End: 2020-11-06
Payer: MEDICARE

## 2020-11-06 VITALS
BODY MASS INDEX: 21.17 KG/M2 | TEMPERATURE: 98 F | HEIGHT: 74 IN | SYSTOLIC BLOOD PRESSURE: 126 MMHG | DIASTOLIC BLOOD PRESSURE: 70 MMHG | WEIGHT: 165 LBS | OXYGEN SATURATION: 93 % | HEART RATE: 86 BPM

## 2020-11-06 DIAGNOSIS — I25.10 CORONARY ARTERY DISEASE INVOLVING NATIVE CORONARY ARTERY OF NATIVE HEART WITHOUT ANGINA PECTORIS: ICD-10-CM

## 2020-11-06 DIAGNOSIS — M1A.09X0 IDIOPATHIC CHRONIC GOUT OF MULTIPLE SITES WITHOUT TOPHUS: ICD-10-CM

## 2020-11-06 DIAGNOSIS — E11.29 CONTROLLED TYPE 2 DIABETES MELLITUS WITH MICROALBUMINURIA, WITHOUT LONG-TERM CURRENT USE OF INSULIN: ICD-10-CM

## 2020-11-06 DIAGNOSIS — F11.90 CHRONIC NARCOTIC USE: ICD-10-CM

## 2020-11-06 DIAGNOSIS — M54.50 CHRONIC MIDLINE LOW BACK PAIN WITHOUT SCIATICA: ICD-10-CM

## 2020-11-06 DIAGNOSIS — I10 ESSENTIAL HYPERTENSION: ICD-10-CM

## 2020-11-06 DIAGNOSIS — G89.29 CHRONIC MIDLINE LOW BACK PAIN WITHOUT SCIATICA: ICD-10-CM

## 2020-11-06 DIAGNOSIS — R80.9 CONTROLLED TYPE 2 DIABETES MELLITUS WITH MICROALBUMINURIA, WITHOUT LONG-TERM CURRENT USE OF INSULIN: ICD-10-CM

## 2020-11-06 DIAGNOSIS — Z23 NEED FOR SHINGLES VACCINE: Primary | ICD-10-CM

## 2020-11-06 DIAGNOSIS — J44.9 CHRONIC OBSTRUCTIVE PULMONARY DISEASE, UNSPECIFIED COPD TYPE: ICD-10-CM

## 2020-11-06 PROCEDURE — 99999 PR PBB SHADOW E&M-EST. PATIENT-LVL III: CPT | Mod: PBBFAC,,, | Performed by: INTERNAL MEDICINE

## 2020-11-06 PROCEDURE — 99214 OFFICE O/P EST MOD 30 MIN: CPT | Mod: S$PBB,,, | Performed by: INTERNAL MEDICINE

## 2020-11-06 PROCEDURE — 99999 PR PBB SHADOW E&M-EST. PATIENT-LVL III: ICD-10-PCS | Mod: PBBFAC,,, | Performed by: INTERNAL MEDICINE

## 2020-11-06 PROCEDURE — 99214 PR OFFICE/OUTPT VISIT, EST, LEVL IV, 30-39 MIN: ICD-10-PCS | Mod: S$PBB,,, | Performed by: INTERNAL MEDICINE

## 2020-11-06 PROCEDURE — 99213 OFFICE O/P EST LOW 20 MIN: CPT | Mod: PBBFAC,PO | Performed by: INTERNAL MEDICINE

## 2020-11-06 PROCEDURE — 90750 HZV VACC RECOMBINANT IM: CPT | Mod: PBBFAC,PO

## 2020-11-06 RX ORDER — METFORMIN HYDROCHLORIDE EXTENDED-RELEASE TABLETS 500 MG/1
500 TABLET, FILM COATED, EXTENDED RELEASE ORAL 2 TIMES DAILY WITH MEALS
COMMUNITY
End: 2020-11-06 | Stop reason: ALTCHOICE

## 2020-11-06 RX ORDER — ROSUVASTATIN CALCIUM 40 MG/1
20 TABLET, COATED ORAL NIGHTLY
COMMUNITY

## 2020-11-06 RX ORDER — LOSARTAN POTASSIUM 100 MG/1
100 TABLET ORAL DAILY
COMMUNITY
Start: 2020-10-26 | End: 2021-04-21 | Stop reason: ALTCHOICE

## 2020-11-06 RX ORDER — ASPIRIN 81 MG/1
81 TABLET ORAL DAILY
COMMUNITY

## 2020-11-06 RX ORDER — HYDROCODONE BITARTRATE AND ACETAMINOPHEN 10; 325 MG/1; MG/1
1 TABLET ORAL
Qty: 30 TABLET | Refills: 0 | Status: SHIPPED | OUTPATIENT
Start: 2020-12-29 | End: 2021-01-22 | Stop reason: SDUPTHER

## 2020-11-06 RX ORDER — HYDROCODONE BITARTRATE AND ACETAMINOPHEN 10; 325 MG/1; MG/1
1 TABLET ORAL
Qty: 30 TABLET | Refills: 0 | Status: SHIPPED | OUTPATIENT
Start: 2020-11-29 | End: 2020-12-29

## 2020-11-06 RX ORDER — HYDROCODONE BITARTRATE AND ACETAMINOPHEN 10; 325 MG/1; MG/1
1 TABLET ORAL
Qty: 30 TABLET | Refills: 0 | Status: SHIPPED | OUTPATIENT
Start: 2021-01-28 | End: 2021-02-27

## 2020-11-06 RX ORDER — CARVEDILOL 25 MG/1
12.5 TABLET ORAL 2 TIMES DAILY
COMMUNITY
Start: 2020-08-17 | End: 2021-07-28 | Stop reason: DRUGHIGH

## 2020-11-06 RX ORDER — METFORMIN HYDROCHLORIDE EXTENDED-RELEASE TABLETS 500 MG/1
500 TABLET, FILM COATED, EXTENDED RELEASE ORAL 2 TIMES DAILY WITH MEALS
COMMUNITY
Start: 2020-07-13

## 2020-11-06 NOTE — PROGRESS NOTES
SHINGRIX dose#2,Lot#[id number],  Given IM in Right Deltoid, tolerated well. Patient instructed to waited 15 minutes ,VIS form given

## 2020-11-06 NOTE — PROGRESS NOTES
This note was created by combination of typed  and M-Modal dictation.  Transcription errors may be present.  If there are any questions, please contact me.    Assessment and Plan:   Chronic narcotic use  Chronic midline low back pain without sciatica; by report, 9/8/2011 MRI lumbar spine schmorl's nodes and ligamentous hypertrophy, root impingment noted  -stable on current regimen,  queried, no aberrancy.  Finds the dose effective.  Postdated prescriptions sent to pharmacy.  -     HYDROcodone-acetaminophen (NORCO)  mg per tablet; Take 1 tablet by mouth every 24 hours as needed for Pain.  Dispense: 30 tablet; Refill: 0  -     HYDROcodone-acetaminophen (NORCO)  mg per tablet; Take 1 tablet by mouth every 24 hours as needed for Pain.  Dispense: 30 tablet; Refill: 0  -     HYDROcodone-acetaminophen (NORCO)  mg per tablet; Take 1 tablet by mouth every 24 hours as needed for Pain.  Dispense: 30 tablet; Refill: 0    Need for shingles vaccine  -     (In Office Administered) Zoster Recombinant Vaccine    Essential hypertension  Coronary artery disease involving native coronary artery of native heart without angina pectoris  Controlled type 2 diabetes mellitus with microalbuminuria, without long-term current use of insulin  Idiopathic chronic gout of multiple sites without tophus  Chronic obstructive pulmonary disease, unspecified COPD type  [facility]  Because of significant risk for overmedication/medication error I am not going to manage any of his other chronic health conditions other than his chronic pain.  I had been filling his irbesartan and it looks like the [facility] is under the impression that he is taking losartan.  Similarly it is unclear what dose of allopurinol he is taking.  [facility] is managing his [facility]in therapy with rosuvastatin  And carvedilol, amlodipine  On aspirin and Plavix  They managed his inhalers, albuterol and Atrovent  He reports he had AAA ultrasound  [facility].    Medications Discontinued During This Encounter   Medication Reason    simvastatin (ZOCOR) 40 MG tablet Therapy completed    glipiZIDE (GLUCOTROL) 5 MG tablet Therapy completed    HYDROcodone-acetaminophen (NORCO)  mg per tablet Reorder    carvediloL (COREG) 12.5 MG tablet Alternate therapy    empagliflozin (JARDIANCE) 10 mg tablet Alternate therapy    metFORMIN (GLUCOPHAGE) 500 MG tablet Alternate therapy    metFORMIN (FORTAMET) 500 mg 24hr tablet Alternate therapy       meds sent this encounter:  Medications Ordered This Encounter   Medications    HYDROcodone-acetaminophen (NORCO)  mg per tablet     Sig: Take 1 tablet by mouth every 24 hours as needed for Pain.     Dispense:  30 tablet     Refill:  0     Quantity prescribed more than 7 day supply? Yes, quantity medically necessary    HYDROcodone-acetaminophen (NORCO)  mg per tablet     Sig: Take 1 tablet by mouth every 24 hours as needed for Pain.     Dispense:  30 tablet     Refill:  0     Quantity prescribed more than 7 day supply? Yes, quantity medically necessary    HYDROcodone-acetaminophen (NORCO)  mg per tablet     Sig: Take 1 tablet by mouth every 24 hours as needed for Pain.     Dispense:  30 tablet     Refill:  0     Quantity prescribed more than 7 day supply? Yes, quantity medically necessary       Follow Up: No follow-ups on file. follow up 3 months.  I asked him to bring in his meds to review.    Subjective:     Chief Complaint   Patient presents with    Arthritis    Diabetes       HPI  Paulino is a 64 y.o. male, last appointment with this clinic was 8/6/2020.    Last visit with me in August.  Chronic narcotic use.  Stable at that time.  Diabetes A1c okay.  The time it was unclear what he was taking.  Should be taking metformin, glipizide, Jardiance  COPD, should be on Symbicort.    ER visit 10/23 for shoulder and neck pain.  Muscle relaxer.    [facility], Should be on   Amlodipine 5, half  tablet daily  Aspirin 81  Carvedilol 25, half tablet b.i.d.  Clopidogrel 75  empagliflozin 25 1/2 tablet   allopurinol 300  Plavix 75  empagliflozin half tablet  Losartan 100  Metformin , 1000 b.i.d.  Rosuvastatin 40, half tablet  Atrovent inhaler    Recent fills on amlodipine, carvedilol, irbesartan, Luck    [facility] 10/[facility]  Urine microalbumin 11.6  CBC 9.9/11.9/35.6/343 MCV 86  a1c 7.3  Lipid panel TC 72 TG 57 HDL 29 LDL 31  Creatinine 1.2    Patient has some familiarity with the medications but not all of them.  I attempted medication reconciliation.  It sounds like he is taking irbesartan and not losartan.  He knows he is taking a cholesterol medicine  And he is taking aspirin.  Should be on Plavix as well.  Because there is a significant risk for medication error/overlap, I am only going to manage his chronic pain from this point forward.  All of his other chronic medical conditions need to be [facility].    Denies issues with the chronic narcotics.  Finds it helpful.      Patient Care Team:  Rogelio March MD as PCP - General (Internal Medicine)  Fermin Lee Jr., MD as Consulting Physician (Family Medicine)  Lashon Garcia MA as Care Coordinator  [facility]  Priyanka Guerra MD (Internal Medicine)    Patient Active Problem List    Diagnosis Date Noted    Ventral hernia without obstruction or gangrene 06/08/2020    Anemia of chronic disease on labs 9/2019 09/11/2019    GERD (gastroesophageal reflux disease) 05/14/2019    COPD (chronic obstructive pulmonary disease) with reported hx of abn PFTs 05/14/2019    Primary osteoarthritis of both knees hx of injection 05/14/2019    History of hepatitis C s/p treatment per pt.  [facility], reported MRI liver 8/2018 08/29/2018    Idiopathic gout of multiple sites 11/24/2017    Chronic midline low back pain without sciatica; by report, 9/8/2011 MRI lumbar spine schmorl's nodes and ligamentous hypertrophy, root impingment  noted 10/24/2017    Chronic narcotic use 10/24/2017    Essential hypertension 10/24/2017    Controlled type 2 diabetes mellitus with microalbuminuria, without long-term current use of insulin 10/24/2017    Coronary artery disease involving native coronary artery of native heart without angina pectoris 10/24/2017    Benign prostatic hyperplasia without lower urinary tract symptoms 10/24/2017    Smoker 10/24/2017       PAST MEDICAL HISTORY:  Past Medical History:   Diagnosis Date    Anemia     BPH (benign prostatic hyperplasia)     Chronic low back pain     Chronic prescription opiate use     COPD (chronic obstructive pulmonary disease)     Coronary artery disease     Diabetes mellitus, type 2     GERD (gastroesophageal reflux disease)     Hepatitis C     Hyperlipidemia     Hypertension        PAST SURGICAL HISTORY:  Past Surgical History:   Procedure Laterality Date    BUTTOCK MASS EXCISION Left 06/08/2020    BUTTOCK MASS EXCISION Left 6/8/2020    Procedure: EXCISION, MASS, BUTTOCK;  Surgeon: Jed Dickerson MD;  Location: [facility];  Service: General;  Laterality: Left;    COLONOSCOPY      CORONARY ANGIOPLASTY WITH STENT PLACEMENT  2010    HERNIA REPAIR      LAPAROSCOPIC REPAIR OF INCARCERATED VENTRAL HERNIA  06/08/2020    with mesh    LAPAROSCOPIC REPAIR OF INCARCERATED VENTRAL HERNIA N/A 6/8/2020    Procedure: REPAIR, HERNIA, VENTRAL, INCARCERATED, LAPAROSCOPIC;  Surgeon: Jed Dickerson MD;  Location: [facility] OR;  Service: General;  Laterality: N/A;  [employer] VIDEO CONFIRMED 6/5/DME    LIVER BIOPSY         SOCIAL HISTORY:  Social History     Socioeconomic History    Marital status: Single     Spouse name: Not on file    Number of children: Not on file    Years of education: Not on file    Highest education level: Not on file   Occupational History    Occupation: disabled - orthopedic   Social Needs    Financial resource strain: Not on file    Food insecurity     Worry:  Not on file     Inability: Not on file    Transportation needs     Medical: Not on file     Non-medical: Not on file   Tobacco Use    Smoking status: Current Every Day Smoker     Packs/day: 0.25     Types: Cigarettes    Smokeless tobacco: Never Used   Substance and Sexual Activity    Alcohol use: Not Currently     Frequency: Never    Drug use: No    Sexual activity: Yes   Lifestyle    Physical activity     Days per week: Not on file     Minutes per session: Not on file    Stress: Not at all   Relationships    Social connections     Talks on phone: Not on file     Gets together: Not on file     Attends Je[employer]'s witness service: Not on file     Active member of club or organization: Not on file     Attends meetings of clubs or organizations: Not on file     Relationship status: Not on file   Other Topics Concern    Not on file   Social History Narrative    Not on file       ALLERGIES AND MEDICATIONS: updated and reviewed.  Review of patient's allergies indicates:   Allergen Reactions    Ace inhibitors     Codeine        Medication List with Changes/Refills   Current Medications    ALLOPURINOL (ZYLOPRIM) 100 MG TABLET    Take 1 tablet (100 mg total) by mouth once daily. Decreased dose    AMLODIPINE (NORVASC) 2.5 MG TABLET    Take 1 tablet (2.5 mg total) by mouth once daily.    CARVEDILOL (COREG) 12.5 MG TABLET    Take 1 tablet (12.5 mg total) by mouth 2 (two) times daily with meals.    CLOPIDOGREL (PLAVIX) 75 MG TABLET    Take 75 mg by mouth once daily.    FINASTERIDE (PROSCAR) 5 MG TABLET    Take 5 mg by mouth once daily.    FLUTICASONE PROPIONATE (FLONASE) 50 MCG/ACTUATION NASAL SPRAY        GLIPIZIDE (GLUCOTROL) 5 MG TABLET    Take 1 tablet (5 mg total) by mouth 2 (two) times daily before meals.    HYDROCODONE-ACETAMINOPHEN (NORCO)  MG PER TABLET    Take 1 tablet by mouth every 24 hours as needed for Pain.    IRBESARTAN (AVAPRO) 300 MG TABLET    Take 1 tablet (300 mg total) by mouth every evening.     "METFORMIN (GLUCOPHAGE) 500 MG TABLET    Take 2 tablets (1,000 mg total) by mouth 2 (two) times daily with meals. Increased dose    OMEPRAZOLE (PRILOSEC) 20 MG CAPSULE    Take 20 mg by mouth once daily.    ONDANSETRON (ZOFRAN) 4 MG TABLET    Take 1 tablet (4 mg total) by mouth every 6 (six) hours.    SIMVASTATIN (ZOCOR) 40 MG TABLET    Take 0.5 tablets (20 mg total) by mouth every evening.    SYMBICORT 160-4.5 MCG/ACTUATION HFAA        TIZANIDINE (ZANAFLEX) 2 MG TABLET    Take 1 tablet (2 mg total) by mouth every 8 (eight) hours as needed.    VENTOLIN HFA 90 MCG/ACTUATION INHALER            Review of Systems   Constitutional: Negative for chills and fever.   Respiratory: Negative for shortness of breath.    Cardiovascular: Negative for chest pain.       Objective:   Physical Exam   Vitals:    11/06/20 1325   BP: 126/70   BP Location: Right arm   Patient Position: Sitting   BP Method: Medium (Manual)   Pulse: 86   Temp: 97.7 °F (36.5 °C)   TempSrc: Temporal   SpO2: (!) 93%   Weight: 74.8 kg (165 lb)   Height: 6' 2" (1.88 m)    Body mass index is 21.18 kg/m².            Physical Exam  Constitutional:       General: He is not in acute distress.     Appearance: He is well-developed.   Cardiovascular:      Rate and Rhythm: Normal rate and regular rhythm.      Heart sounds: Normal heart sounds. No murmur.   Pulmonary:      Effort: Pulmonary effort is normal.      Breath sounds: Normal breath sounds.   Musculoskeletal: Normal range of motion.      Right lower leg: No edema.      Left lower leg: No edema.   Skin:     General: Skin is warm and dry.   Neurological:      Mental Status: He is alert and oriented to person, place, and time.      Coordination: Coordination normal.   Psychiatric:         Behavior: Behavior normal.         Thought Content: Thought content normal.         "

## 2020-11-10 ENCOUNTER — PATIENT OUTREACH (OUTPATIENT)
Dept: ADMINISTRATIVE | Facility: HOSPITAL | Age: 65
End: 2020-11-10

## 2021-01-22 ENCOUNTER — OFFICE VISIT (OUTPATIENT)
Dept: FAMILY MEDICINE | Facility: CLINIC | Age: 66
End: 2021-01-22
Payer: MEDICARE

## 2021-01-22 VITALS
WEIGHT: 176 LBS | TEMPERATURE: 98 F | OXYGEN SATURATION: 97 % | HEART RATE: 74 BPM | DIASTOLIC BLOOD PRESSURE: 70 MMHG | SYSTOLIC BLOOD PRESSURE: 112 MMHG | HEIGHT: 74 IN | BODY MASS INDEX: 22.59 KG/M2

## 2021-01-22 DIAGNOSIS — I25.10 CORONARY ARTERY DISEASE INVOLVING NATIVE CORONARY ARTERY OF NATIVE HEART WITHOUT ANGINA PECTORIS: ICD-10-CM

## 2021-01-22 DIAGNOSIS — E11.29 CONTROLLED TYPE 2 DIABETES MELLITUS WITH MICROALBUMINURIA, WITHOUT LONG-TERM CURRENT USE OF INSULIN: ICD-10-CM

## 2021-01-22 DIAGNOSIS — I10 ESSENTIAL HYPERTENSION: ICD-10-CM

## 2021-01-22 DIAGNOSIS — F17.200 SMOKER: ICD-10-CM

## 2021-01-22 DIAGNOSIS — F11.90 CHRONIC NARCOTIC USE: Primary | ICD-10-CM

## 2021-01-22 DIAGNOSIS — M1A.09X0 IDIOPATHIC CHRONIC GOUT OF MULTIPLE SITES WITHOUT TOPHUS: ICD-10-CM

## 2021-01-22 DIAGNOSIS — M54.50 CHRONIC MIDLINE LOW BACK PAIN WITHOUT SCIATICA: ICD-10-CM

## 2021-01-22 DIAGNOSIS — J44.9 CHRONIC OBSTRUCTIVE PULMONARY DISEASE, UNSPECIFIED COPD TYPE: ICD-10-CM

## 2021-01-22 DIAGNOSIS — G89.29 CHRONIC MIDLINE LOW BACK PAIN WITHOUT SCIATICA: ICD-10-CM

## 2021-01-22 DIAGNOSIS — R80.9 CONTROLLED TYPE 2 DIABETES MELLITUS WITH MICROALBUMINURIA, WITHOUT LONG-TERM CURRENT USE OF INSULIN: ICD-10-CM

## 2021-01-22 PROCEDURE — 99999 PR PBB SHADOW E&M-EST. PATIENT-LVL IV: ICD-10-PCS | Mod: PBBFAC,,, | Performed by: INTERNAL MEDICINE

## 2021-01-22 PROCEDURE — 99214 OFFICE O/P EST MOD 30 MIN: CPT | Mod: PBBFAC,PO | Performed by: INTERNAL MEDICINE

## 2021-01-22 PROCEDURE — 99214 PR OFFICE/OUTPT VISIT, EST, LEVL IV, 30-39 MIN: ICD-10-PCS | Mod: S$PBB,,, | Performed by: INTERNAL MEDICINE

## 2021-01-22 PROCEDURE — 99999 PR PBB SHADOW E&M-EST. PATIENT-LVL IV: CPT | Mod: PBBFAC,,, | Performed by: INTERNAL MEDICINE

## 2021-01-22 PROCEDURE — 99214 OFFICE O/P EST MOD 30 MIN: CPT | Mod: S$PBB,,, | Performed by: INTERNAL MEDICINE

## 2021-01-22 RX ORDER — HYDROCODONE BITARTRATE AND ACETAMINOPHEN 10; 325 MG/1; MG/1
1 TABLET ORAL
Qty: 30 TABLET | Refills: 0 | Status: SHIPPED | OUTPATIENT
Start: 2021-03-27 | End: 2021-02-26 | Stop reason: SDUPTHER

## 2021-01-22 RX ORDER — HYDROCODONE BITARTRATE AND ACETAMINOPHEN 10; 325 MG/1; MG/1
1 TABLET ORAL
Qty: 30 TABLET | Refills: 0 | Status: SHIPPED | OUTPATIENT
Start: 2021-01-28 | End: 2021-02-27

## 2021-01-22 RX ORDER — HYDROCODONE BITARTRATE AND ACETAMINOPHEN 10; 325 MG/1; MG/1
1 TABLET ORAL
Qty: 30 TABLET | Refills: 0 | Status: SHIPPED | OUTPATIENT
Start: 2021-02-27 | End: 2021-02-26 | Stop reason: SDUPTHER

## 2021-01-26 ENCOUNTER — TELEPHONE (OUTPATIENT)
Dept: FAMILY MEDICINE | Facility: CLINIC | Age: 66
End: 2021-01-26

## 2021-02-26 ENCOUNTER — TELEPHONE (OUTPATIENT)
Dept: FAMILY MEDICINE | Facility: CLINIC | Age: 66
End: 2021-02-26

## 2021-02-26 DIAGNOSIS — M54.50 CHRONIC MIDLINE LOW BACK PAIN WITHOUT SCIATICA: ICD-10-CM

## 2021-02-26 DIAGNOSIS — F11.90 CHRONIC NARCOTIC USE: ICD-10-CM

## 2021-02-26 DIAGNOSIS — G89.29 CHRONIC MIDLINE LOW BACK PAIN WITHOUT SCIATICA: ICD-10-CM

## 2021-02-26 RX ORDER — HYDROCODONE BITARTRATE AND ACETAMINOPHEN 10; 325 MG/1; MG/1
1 TABLET ORAL
Qty: 30 TABLET | Refills: 0 | Status: SHIPPED | OUTPATIENT
Start: 2021-02-26 | End: 2021-02-26 | Stop reason: SDUPTHER

## 2021-02-26 RX ORDER — HYDROCODONE BITARTRATE AND ACETAMINOPHEN 10; 325 MG/1; MG/1
1 TABLET ORAL
Qty: 30 TABLET | Refills: 0 | Status: SHIPPED | OUTPATIENT
Start: 2021-03-27 | End: 2021-04-21 | Stop reason: SDUPTHER

## 2021-02-26 RX ORDER — HYDROCODONE BITARTRATE AND ACETAMINOPHEN 10; 325 MG/1; MG/1
1 TABLET ORAL
Qty: 30 TABLET | Refills: 0 | Status: SHIPPED | OUTPATIENT
Start: 2021-02-26 | End: 2021-03-28

## 2021-04-14 DIAGNOSIS — E11.9 TYPE 2 DIABETES MELLITUS WITHOUT COMPLICATION, UNSPECIFIED WHETHER LONG TERM INSULIN USE: ICD-10-CM

## 2021-04-21 ENCOUNTER — OFFICE VISIT (OUTPATIENT)
Dept: FAMILY MEDICINE | Facility: CLINIC | Age: 66
End: 2021-04-21
Payer: MEDICARE

## 2021-04-21 VITALS
DIASTOLIC BLOOD PRESSURE: 70 MMHG | HEART RATE: 76 BPM | HEIGHT: 74 IN | WEIGHT: 180 LBS | SYSTOLIC BLOOD PRESSURE: 130 MMHG | BODY MASS INDEX: 23.1 KG/M2 | OXYGEN SATURATION: 98 % | TEMPERATURE: 98 F

## 2021-04-21 DIAGNOSIS — M54.50 CHRONIC MIDLINE LOW BACK PAIN WITHOUT SCIATICA: ICD-10-CM

## 2021-04-21 DIAGNOSIS — F11.90 CHRONIC NARCOTIC USE: Primary | ICD-10-CM

## 2021-04-21 DIAGNOSIS — I10 ESSENTIAL HYPERTENSION: ICD-10-CM

## 2021-04-21 DIAGNOSIS — G89.29 CHRONIC MIDLINE LOW BACK PAIN WITHOUT SCIATICA: ICD-10-CM

## 2021-04-21 PROCEDURE — 99999 PR PBB SHADOW E&M-EST. PATIENT-LVL III: ICD-10-PCS | Mod: PBBFAC,,, | Performed by: INTERNAL MEDICINE

## 2021-04-21 PROCEDURE — 99214 PR OFFICE/OUTPT VISIT, EST, LEVL IV, 30-39 MIN: ICD-10-PCS | Mod: S$PBB,,, | Performed by: INTERNAL MEDICINE

## 2021-04-21 PROCEDURE — 99999 PR PBB SHADOW E&M-EST. PATIENT-LVL III: CPT | Mod: PBBFAC,,, | Performed by: INTERNAL MEDICINE

## 2021-04-21 PROCEDURE — 99214 OFFICE O/P EST MOD 30 MIN: CPT | Mod: S$PBB,,, | Performed by: INTERNAL MEDICINE

## 2021-04-21 PROCEDURE — 99213 OFFICE O/P EST LOW 20 MIN: CPT | Mod: PBBFAC,PO | Performed by: INTERNAL MEDICINE

## 2021-04-21 RX ORDER — HYDROCODONE BITARTRATE AND ACETAMINOPHEN 10; 325 MG/1; MG/1
1 TABLET ORAL
Qty: 30 TABLET | Refills: 0 | Status: SHIPPED | OUTPATIENT
Start: 2021-04-26 | End: 2021-05-26

## 2021-04-21 RX ORDER — HYDROCODONE BITARTRATE AND ACETAMINOPHEN 10; 325 MG/1; MG/1
1 TABLET ORAL
Qty: 30 TABLET | Refills: 0 | Status: SHIPPED | OUTPATIENT
Start: 2021-06-25 | End: 2021-07-25

## 2021-04-21 RX ORDER — IRBESARTAN 150 MG/1
150 TABLET ORAL NIGHTLY
COMMUNITY
End: 2021-04-21 | Stop reason: CLARIF

## 2021-04-21 RX ORDER — HYDROCODONE BITARTRATE AND ACETAMINOPHEN 10; 325 MG/1; MG/1
1 TABLET ORAL
Qty: 30 TABLET | Refills: 0 | Status: SHIPPED | OUTPATIENT
Start: 2021-05-26 | End: 2021-06-25

## 2021-04-21 RX ORDER — IRBESARTAN 300 MG/1
300 TABLET ORAL NIGHTLY
COMMUNITY
End: 2021-07-28 | Stop reason: ALTCHOICE

## 2021-04-26 LAB — HBA1C MFR BLD: 8.3 % (ref 4–6)

## 2021-05-05 DIAGNOSIS — E11.9 TYPE 2 DIABETES MELLITUS WITHOUT COMPLICATION: ICD-10-CM

## 2021-07-16 ENCOUNTER — TELEPHONE (OUTPATIENT)
Dept: FAMILY MEDICINE | Facility: CLINIC | Age: 66
End: 2021-07-16

## 2021-07-23 DIAGNOSIS — M54.50 CHRONIC MIDLINE LOW BACK PAIN WITHOUT SCIATICA: ICD-10-CM

## 2021-07-23 DIAGNOSIS — G89.29 CHRONIC MIDLINE LOW BACK PAIN WITHOUT SCIATICA: ICD-10-CM

## 2021-07-23 DIAGNOSIS — F11.90 CHRONIC NARCOTIC USE: ICD-10-CM

## 2021-07-23 RX ORDER — HYDROCODONE BITARTRATE AND ACETAMINOPHEN 10; 325 MG/1; MG/1
1 TABLET ORAL
Qty: 30 TABLET | Refills: 0 | OUTPATIENT
Start: 2021-07-23 | End: 2021-08-22

## 2021-07-28 RX ORDER — CARVEDILOL 12.5 MG/1
12.5 TABLET ORAL 2 TIMES DAILY
COMMUNITY
Start: 2021-02-26 | End: 2023-02-02 | Stop reason: SDUPTHER

## 2021-07-28 RX ORDER — SILDENAFIL 50 MG/1
TABLET, FILM COATED ORAL
COMMUNITY
Start: 2021-06-07

## 2021-07-28 RX ORDER — LOSARTAN POTASSIUM 100 MG/1
1 TABLET ORAL DAILY
COMMUNITY
Start: 2020-10-26 | End: 2023-02-02 | Stop reason: SDUPTHER

## 2021-07-29 ENCOUNTER — TELEPHONE (OUTPATIENT)
Dept: FAMILY MEDICINE | Facility: CLINIC | Age: 66
End: 2021-07-29

## 2021-07-29 ENCOUNTER — PATIENT OUTREACH (OUTPATIENT)
Dept: ADMINISTRATIVE | Facility: HOSPITAL | Age: 66
End: 2021-07-29

## 2021-07-29 ENCOUNTER — OFFICE VISIT (OUTPATIENT)
Dept: FAMILY MEDICINE | Facility: CLINIC | Age: 66
End: 2021-07-29
Payer: MEDICARE

## 2021-07-29 VITALS
BODY MASS INDEX: 22.36 KG/M2 | TEMPERATURE: 98 F | WEIGHT: 174.25 LBS | SYSTOLIC BLOOD PRESSURE: 124 MMHG | HEIGHT: 74 IN | OXYGEN SATURATION: 98 % | HEART RATE: 64 BPM | DIASTOLIC BLOOD PRESSURE: 66 MMHG

## 2021-07-29 DIAGNOSIS — G89.29 CHRONIC MIDLINE LOW BACK PAIN WITHOUT SCIATICA: ICD-10-CM

## 2021-07-29 DIAGNOSIS — M54.50 CHRONIC MIDLINE LOW BACK PAIN WITHOUT SCIATICA: ICD-10-CM

## 2021-07-29 DIAGNOSIS — R80.9 CONTROLLED TYPE 2 DIABETES MELLITUS WITH MICROALBUMINURIA, WITHOUT LONG-TERM CURRENT USE OF INSULIN: ICD-10-CM

## 2021-07-29 DIAGNOSIS — E11.29 CONTROLLED TYPE 2 DIABETES MELLITUS WITH MICROALBUMINURIA, WITHOUT LONG-TERM CURRENT USE OF INSULIN: ICD-10-CM

## 2021-07-29 DIAGNOSIS — F11.90 CHRONIC NARCOTIC USE: Primary | ICD-10-CM

## 2021-07-29 DIAGNOSIS — F11.90 CHRONIC NARCOTIC USE: ICD-10-CM

## 2021-07-29 PROBLEM — K83.8 COMMON BILE DUCT DILATATION: Status: ACTIVE | Noted: 2021-07-29

## 2021-07-29 PROCEDURE — 99999 PR PBB SHADOW E&M-EST. PATIENT-LVL IV: CPT | Mod: PBBFAC,,, | Performed by: INTERNAL MEDICINE

## 2021-07-29 PROCEDURE — 99999 PR PBB SHADOW E&M-EST. PATIENT-LVL IV: ICD-10-PCS | Mod: PBBFAC,,, | Performed by: INTERNAL MEDICINE

## 2021-07-29 PROCEDURE — 99214 PR OFFICE/OUTPT VISIT, EST, LEVL IV, 30-39 MIN: ICD-10-PCS | Mod: S$PBB,,, | Performed by: INTERNAL MEDICINE

## 2021-07-29 PROCEDURE — 99214 OFFICE O/P EST MOD 30 MIN: CPT | Mod: PBBFAC,PO | Performed by: INTERNAL MEDICINE

## 2021-07-29 PROCEDURE — 99214 OFFICE O/P EST MOD 30 MIN: CPT | Mod: S$PBB,,, | Performed by: INTERNAL MEDICINE

## 2021-07-29 RX ORDER — HYDROCODONE BITARTRATE AND ACETAMINOPHEN 10; 325 MG/1; MG/1
1 TABLET ORAL
Qty: 30 TABLET | Refills: 0 | Status: SHIPPED | OUTPATIENT
Start: 2021-07-29 | End: 2021-10-28 | Stop reason: SDUPTHER

## 2021-07-29 RX ORDER — HYDROCODONE BITARTRATE AND ACETAMINOPHEN 10; 325 MG/1; MG/1
1 TABLET ORAL
Qty: 30 TABLET | Refills: 0 | Status: SHIPPED | OUTPATIENT
Start: 2021-07-29 | End: 2021-07-29 | Stop reason: SDUPTHER

## 2021-07-29 RX ORDER — HYDROCODONE BITARTRATE AND ACETAMINOPHEN 10; 325 MG/1; MG/1
1 TABLET ORAL
Qty: 30 TABLET | Refills: 0 | Status: SHIPPED | OUTPATIENT
Start: 2021-09-28 | End: 2021-07-29 | Stop reason: SDUPTHER

## 2021-07-29 RX ORDER — HYDROCODONE BITARTRATE AND ACETAMINOPHEN 10; 325 MG/1; MG/1
1 TABLET ORAL
Qty: 30 TABLET | Refills: 0 | Status: SHIPPED | OUTPATIENT
Start: 2021-08-28 | End: 2021-10-28 | Stop reason: SDUPTHER

## 2021-07-29 RX ORDER — HYDROCODONE BITARTRATE AND ACETAMINOPHEN 10; 325 MG/1; MG/1
1 TABLET ORAL
Qty: 30 TABLET | Refills: 0 | Status: SHIPPED | OUTPATIENT
Start: 2021-08-28 | End: 2021-07-29 | Stop reason: SDUPTHER

## 2021-07-29 RX ORDER — HYDROCODONE BITARTRATE AND ACETAMINOPHEN 10; 325 MG/1; MG/1
1 TABLET ORAL
Qty: 30 TABLET | Refills: 0 | Status: SHIPPED | OUTPATIENT
Start: 2021-09-28 | End: 2021-10-28 | Stop reason: SDUPTHER

## 2021-08-02 NOTE — TELEPHONE ENCOUNTER
----- Message from Skylar Garcia sent at 3/18/2019  9:18 AM CDT -----  Contact: Self  Patient called to request medication refill. Please call at [phone]        HYDROcodone-acetaminophen (NORCO)  mg per tablet 30 tablet   carvedilol (COREG) 12.5 MG tablet 180 tablet           [facility] - [place], [place] - [address]  
What Type Of Note Output Would You Prefer (Optional)?: Bullet Format
Hpi Title: Evaluation of Skin Lesions
How Severe Are Your Spot(S)?: mild
Have Your Spot(S) Been Treated In The Past?: has not been treated

## 2021-10-26 LAB
CHOLEST SERPL-MSCNC: 87 MG/DL (ref 0–200)
HBA1C MFR BLD: 7.1 % (ref 4–6)
HDLC SERPL-MCNC: 39 MG/DL
LDLC SERPL CALC-MCNC: 28 MG/DL (ref 0–160)
LDLC SERPL CALC-MCNC: 31 MG/DL
TRIGLYCERIDE (LIPID PAN): 87

## 2021-10-28 DIAGNOSIS — E11.9 TYPE 2 DIABETES MELLITUS WITHOUT COMPLICATION: ICD-10-CM

## 2021-10-29 ENCOUNTER — PATIENT OUTREACH (OUTPATIENT)
Dept: ADMINISTRATIVE | Facility: HOSPITAL | Age: 66
End: 2021-10-29
Payer: MEDICAID

## 2021-10-29 ENCOUNTER — OFFICE VISIT (OUTPATIENT)
Dept: FAMILY MEDICINE | Facility: CLINIC | Age: 66
End: 2021-10-29
Payer: MEDICARE

## 2021-10-29 VITALS
OXYGEN SATURATION: 96 % | WEIGHT: 178.13 LBS | HEART RATE: 78 BPM | SYSTOLIC BLOOD PRESSURE: 138 MMHG | DIASTOLIC BLOOD PRESSURE: 80 MMHG | TEMPERATURE: 98 F | HEIGHT: 74 IN | BODY MASS INDEX: 22.86 KG/M2

## 2021-10-29 DIAGNOSIS — G89.29 CHRONIC MIDLINE LOW BACK PAIN WITHOUT SCIATICA: ICD-10-CM

## 2021-10-29 DIAGNOSIS — F17.200 SMOKER: ICD-10-CM

## 2021-10-29 DIAGNOSIS — M54.50 CHRONIC MIDLINE LOW BACK PAIN WITHOUT SCIATICA: ICD-10-CM

## 2021-10-29 DIAGNOSIS — F11.90 CHRONIC NARCOTIC USE: Primary | ICD-10-CM

## 2021-10-29 PROCEDURE — 99999 PR PBB SHADOW E&M-EST. PATIENT-LVL V: ICD-10-PCS | Mod: PBBFAC,,, | Performed by: INTERNAL MEDICINE

## 2021-10-29 PROCEDURE — 99215 OFFICE O/P EST HI 40 MIN: CPT | Mod: PBBFAC,PO | Performed by: INTERNAL MEDICINE

## 2021-10-29 PROCEDURE — 99999 PR PBB SHADOW E&M-EST. PATIENT-LVL V: CPT | Mod: PBBFAC,,, | Performed by: INTERNAL MEDICINE

## 2021-10-29 PROCEDURE — 99214 PR OFFICE/OUTPT VISIT, EST, LEVL IV, 30-39 MIN: ICD-10-PCS | Mod: S$PBB,,, | Performed by: INTERNAL MEDICINE

## 2021-10-29 PROCEDURE — 99214 OFFICE O/P EST MOD 30 MIN: CPT | Mod: S$PBB,,, | Performed by: INTERNAL MEDICINE

## 2021-10-29 RX ORDER — NALOXONE HYDROCHLORIDE 4 MG/.1ML
1 SPRAY NASAL ONCE
Qty: 1 EACH | Refills: 1 | Status: SHIPPED | OUTPATIENT
Start: 2021-10-29 | End: 2021-10-29

## 2021-10-29 RX ORDER — HYDROCODONE BITARTRATE AND ACETAMINOPHEN 10; 325 MG/1; MG/1
1 TABLET ORAL
Qty: 30 TABLET | Refills: 0 | Status: SHIPPED | OUTPATIENT
Start: 2021-12-28 | End: 2022-01-28 | Stop reason: SDUPTHER

## 2021-10-29 RX ORDER — HYDROCODONE BITARTRATE AND ACETAMINOPHEN 10; 325 MG/1; MG/1
1 TABLET ORAL
Qty: 30 TABLET | Refills: 0 | Status: SHIPPED | OUTPATIENT
Start: 2021-10-29 | End: 2022-01-28 | Stop reason: SDUPTHER

## 2021-10-29 RX ORDER — HYDROCODONE BITARTRATE AND ACETAMINOPHEN 10; 325 MG/1; MG/1
1 TABLET ORAL
Qty: 30 TABLET | Refills: 0 | Status: SHIPPED | OUTPATIENT
Start: 2021-11-28 | End: 2022-01-28 | Stop reason: SDUPTHER

## 2021-12-06 ENCOUNTER — PES CALL (OUTPATIENT)
Dept: ADMINISTRATIVE | Facility: CLINIC | Age: 66
End: 2021-12-06
Payer: MEDICAID

## 2021-12-13 NOTE — TELEPHONE ENCOUNTER
----- Message from Lina Collazo sent at 9/30/2020  9:21 AM CDT -----  Regarding: Self/  [phone]  Type: RX Refill Request    Who Called:  Patient    Refill or New Rx:  Refill    RX Name and Strength:  :HYDROcodone-acetaminophen (NORCO)  mg per tablet    Preferred Pharmacy with phone number:  [facility] - [place], [place] - [address]      [phone] (Phone)  [phone] (Fax)    Local or Mail Order:  Local    Ordering Provider:  MIRNA March    Would the patient rather a call back or a response via [facility]?   Call back    Best Call Back Number:  [phone] (home)              [Home] : at home, [unfilled] , at the time of the visit. [Other Location: e.g. Home (Enter Location, City,State)___] : at [unfilled] [Verbal consent obtained from patient] : the patient, [unfilled] [FreeTextEntry1] : TEB   start time 454 pm end time 500 pm \par follow up abnl TFT now normalizinf \par 20 weeks pregnant \par \par pt ahs been overall  ok  less nausea \par \par No h/o thyroid disorder  \par EDC April 5 2022 \par \par \par 1st pregnancy    all ok overall \par \par \par PMh  \par  no asthma  \par NO HTN \par  No PreDM \par \par \par PSH  Csection  \par  \par \par FH no thryoid  disease \par \par \par \par All NKDA \par \par  Soc Hx  born[place] in [place]     No chem NO XRT \par Non smoking No alcohol

## 2022-01-28 RX ORDER — NALOXONE HYDROCHLORIDE 4 MG/.1ML
SPRAY NASAL
COMMUNITY
Start: 2021-10-29

## 2022-01-28 NOTE — PROGRESS NOTES
This note was created by combination of typed  and M-Modal dictation.  Transcription errors may be present.  If there are any questions, please contact me.    Assessment and Plan:   Chronic narcotic use  Chronic midline low back pain without sciatica; by report, 9/8/2011 MRI lumbar spine schmorl's nodes and ligamentous hypertrophy, root impingment noted  - queried, no aberrancy  Taking prescription narcotic as prescribed  Intensive monitoring of high risk medication.  -     HYDROcodone-acetaminophen (NORCO)  mg per tablet; Take 1 tablet by mouth every 24 hours as needed for Pain.  Dispense: 30 tablet; Refill: 0  -     HYDROcodone-acetaminophen (NORCO)  mg per tablet; Take 1 tablet by mouth every 24 hours as needed for Pain.  Dispense: 30 tablet; Refill: 0  -     HYDROcodone-acetaminophen (NORCO)  mg per tablet; Take 1 tablet by mouth every 24 hours as needed for Pain.  Dispense: 30 tablet; Refill: 0    Controlled type 2 diabetes mellitus with microalbuminuria, without long-term current use of insulin  -[facility]    Chronic obstructive pulmonary disease, unspecified COPD type  Smoker  -[facility]    Coronary artery disease involving native coronary artery of native heart without angina pectoris  -[facility]    Essential hypertension  -[facility].  Borderline control today, he notes he just smoked a cigarette.    Medications Discontinued During This Encounter   Medication Reason    HYDROcodone-acetaminophen (NORCO)  mg per tablet Reorder    HYDROcodone-acetaminophen (NORCO)  mg per tablet Reorder    HYDROcodone-acetaminophen (NORCO)  mg per tablet Reorder       meds sent this encounter:  Medications Ordered This Encounter   Medications    HYDROcodone-acetaminophen (NORCO)  mg per tablet     Sig: Take 1 tablet by mouth every 24 hours as needed for Pain.     Dispense:  30 tablet     Refill:  0     Quantity prescribed more than 7 day supply? Yes,  quantity medically necessary     Order Specific Question:   I have reviewed the Prescription Drug Monitoring Program (PDMP) database for this patient prior to prescribing the above opioid medication     Answer:   Yes    HYDROcodone-acetaminophen (NORCO)  mg per tablet     Sig: Take 1 tablet by mouth every 24 hours as needed for Pain.     Dispense:  30 tablet     Refill:  0     Quantity prescribed more than 7 day supply? Yes, quantity medically necessary     Order Specific Question:   I have reviewed the Prescription Drug Monitoring Program (PDMP) database for this patient prior to prescribing the above opioid medication     Answer:   Yes    HYDROcodone-acetaminophen (NORCO)  mg per tablet     Sig: Take 1 tablet by mouth every 24 hours as needed for Pain.     Dispense:  30 tablet     Refill:  0     Quantity prescribed more than 7 day supply? Yes, quantity medically necessary     Order Specific Question:   I have reviewed the Prescription Drug Monitoring Program (PDMP) database for this patient prior to prescribing the above opioid medication     Answer:   Yes       Follow Up: No follow-ups on file.  Follow-up 3 months chronic narcotic    Subjective:     Chief Complaint   Patient presents with    Medication Refill    Low-back Pain       HPI  Paulino is a 66 y.o. male, last appointment with this clinic was 10/29/2021.  Social History     Tobacco Use    Smoking status: Current Every Day Smoker     Packs/day: 0.25     Types: Cigarettes    Smokeless tobacco: Never Used   Substance Use Topics    Alcohol use: Not Currently      Social History     Occupational History    Occupation: disabled - orthopedic      Social History     Social History Narrative    Not on file     Last visit with me in late October.  Chronic back pain, chronic narcotic use.  Stable.  Smoking declined referral to smoking cessation  [facility]    On allopurinol 100  Aspirin  Coreg  12.5  Albuterol  Plavix  Empagliflozin 12.5  Losartan 100  Metformin ER 1000 b.i.d.  Omeprazole 40  Crestor 20  Spiriva    10/26/2021 A1c 7.1  Lipid TC 87 TG 87 HDL 39 LDL 30  CBC normal  CMP creatinine 1.1     12/29    Here for refill of his chronic pain medications.  Taking regularly as prescribed.  Denies any issues.  Denies issues with mood depression, denies issues with insomnia.  No constipation.  Finds the dose effective.  Keeps him functional.  Hunting, fishing, also working as a volunteer with geriatric people    From what I can see of [facility] care [facility] records, had an echo 1/14. Dr. Quijano.  No results.   Fito bowles eye doctor 7/2021 visit per visit list in [facility] care [facility], no results.  nestor - trae Guerra - PCP    Patient Care Team:  Rogelio March MD as PCP - General (Internal Medicine)  Fermin Lee Jr., MD as Consulting Physician (Family Medicine)  Lashon Garcia MA as Care Coordinator  [facility]  Priyanka Guerra MD (Internal Medicine)  Fito Bowles Sr., MD as Consulting Physician (Ophthalmology)    Patient Active Problem List    Diagnosis Date Noted    Common bile duct dilatation on CT and MRI, tumor markers negative, no further imaging 07/29/2021    Ventral hernia without obstruction or gangrene 06/08/2020    Anemia of chronic disease on labs 9/2019 09/11/2019    GERD (gastroesophageal reflux disease) 05/14/2019    COPD (chronic obstructive pulmonary disease) with reported hx of abn PFTs 05/14/2019    Primary osteoarthritis of both knees hx of injection 05/14/2019    History of hepatitis C s/p treatment per pt.  [facility], reported MRI liver 8/2018 08/29/2018    Idiopathic gout of multiple sites 11/24/2017    Chronic midline low back pain without sciatica; by report, 9/8/2011 MRI lumbar spine schmorl's nodes and ligamentous hypertrophy, root impingment noted 10/24/2017    Chronic narcotic use 10/24/2017    Essential  hypertension 10/24/2017    Controlled type 2 diabetes mellitus with microalbuminuria, without long-term current use of insulin 10/24/2017    Coronary artery disease involving native coronary artery of native heart without angina pectoris 10/24/2017    Benign prostatic hyperplasia without lower urinary tract symptoms 10/24/2017    Smoker 10/24/2017       PAST MEDICAL PROBLEMS, PAST SURGICAL HISTORY: please see relevant portions of the electronic medical record    ALLERGIES AND MEDICATIONS: updated and reviewed.  Review of patient's allergies indicates:   Allergen Reactions    Ace inhibitors     Codeine        Medication List with Changes/Refills   Current Medications    ALBUTEROL (PROVENTIL/VENTOLIN HFA) 90 MCG/ACTUATION INHALER    INHALE 2 PUFFS BY MOUTH EVERY FOUR HOURS AS NEEDED AS A RESCUE INHALER    ALLOPURINOL (ZYLOPRIM) 100 MG TABLET    Take 1 tablet (100 mg total) by mouth once daily. Decreased dose    AMLODIPINE (NORVASC) 2.5 MG TABLET    Take 1 tablet (2.5 mg total) by mouth once daily.    ASPIRIN (ECOTRIN) 81 MG EC TABLET    Take 81 mg by mouth once daily.    CARVEDILOL (COREG) 12.5 MG TABLET    Take 12.5 mg by mouth 2 (two) times a day.    CLOPIDOGREL (PLAVIX) 75 MG TABLET    Take 75 mg by mouth once daily.    EMPAGLIFLOZIN (JARDIANCE) 25 MG TABLET    Take 12.5 mg by mouth once daily.    FLUTICASONE PROPIONATE (FLONASE) 50 MCG/ACTUATION NASAL SPRAY        HYDROCODONE-ACETAMINOPHEN (NORCO)  MG PER TABLET    Take 1 tablet by mouth every 24 hours as needed for Pain.    HYDROCODONE-ACETAMINOPHEN (NORCO)  MG PER TABLET    Take 1 tablet by mouth every 24 hours as needed for Pain.    HYDROCODONE-ACETAMINOPHEN (NORCO)  MG PER TABLET    Take 1 tablet by mouth every 24 hours as needed for Pain.    LIDOCAINE (LIDODERM) 5 %    APPLY 3 PATCHES TOPICALLY EVERY DAY FOR PAIN. WEAR FOR 12 HOURS, THEN REMOVE. DO NOT APPLY NEW PATCH FOR AT LEAST 12 HOURS.    LOSARTAN (COZAAR) 100 MG TABLET    Take 1  "tablet by mouth once daily.    METFORMIN (FORTAMET) 500 MG 24HR TABLET    Take 1,000 mg by mouth 2 (two) times a day.    METHYL SALICYLATE-MENTHOL 15-10% 15-10 % CREA    APPLY MODERATE AMOUNT TOPICALLY ONCE DAILY AS NEEDED    OMEPRAZOLE (PRILOSEC) 20 MG CAPSULE    Take 40 mg by mouth once daily.     ROSUVASTATIN (CRESTOR) 40 MG TAB    Take 20 mg by mouth every evening.    SILDENAFIL (VIAGRA) 50 MG TABLET    TAKE ONE-HALF TABLET BY MOUTH EVERY SEVEN DAYS AS NEEDED 30 TO 60 MINUTES BEFORE INTERCOURSE FOR BEST RESULTS TAKE ON EMPTY STOMACH    TIOTROPIUM BROMIDE (SPIRIVA RESPIMAT) 1.25 MCG/ACTUATION INHALER    INHALE 2 PUFFS BY MOUTH EVERY DAY        Objective:   Physical Exam   Vitals:    01/31/22 1009 01/31/22 1035   BP: (!) 140/78 138/60   BP Location:  Left arm   Patient Position:  Sitting   BP Method:  Medium (Manual)   Pulse: 82    Temp: 98.3 °F (36.8 °C)    TempSrc: Oral    SpO2: 98%    Weight: 80.8 kg (178 lb 3.9 oz)    Height: 6' 2" (1.88 m)     Body mass index is 22.88 kg/m².            Physical Exam  Constitutional:       General: He is not in acute distress.     Appearance: He is well-developed and well-nourished.   Eyes:      Extraocular Movements: EOM normal.   Cardiovascular:      Rate and Rhythm: Normal rate and regular rhythm.      Heart sounds: Normal heart sounds. No murmur heard.      Pulmonary:      Effort: Pulmonary effort is normal.      Breath sounds: Normal breath sounds.   Musculoskeletal:         General: Normal range of motion.      Right lower leg: No edema.      Left lower leg: No edema.      Comments: Seated straight leg raise without limitation of range of motion.   Skin:     General: Skin is warm and dry.   Neurological:      Mental Status: He is alert and oriented to person, place, and time.      Coordination: Coordination normal.   Psychiatric:         Mood and Affect: Mood and affect normal.         Behavior: Behavior normal.         Thought Content: Thought content normal.         "

## 2022-01-31 ENCOUNTER — OFFICE VISIT (OUTPATIENT)
Dept: FAMILY MEDICINE | Facility: CLINIC | Age: 67
End: 2022-01-31
Payer: MEDICARE

## 2022-01-31 VITALS
BODY MASS INDEX: 22.88 KG/M2 | TEMPERATURE: 98 F | WEIGHT: 178.25 LBS | SYSTOLIC BLOOD PRESSURE: 138 MMHG | DIASTOLIC BLOOD PRESSURE: 60 MMHG | HEIGHT: 74 IN | OXYGEN SATURATION: 98 % | HEART RATE: 82 BPM

## 2022-01-31 DIAGNOSIS — G89.29 CHRONIC MIDLINE LOW BACK PAIN WITHOUT SCIATICA: ICD-10-CM

## 2022-01-31 DIAGNOSIS — R80.9 CONTROLLED TYPE 2 DIABETES MELLITUS WITH MICROALBUMINURIA, WITHOUT LONG-TERM CURRENT USE OF INSULIN: ICD-10-CM

## 2022-01-31 DIAGNOSIS — M54.50 CHRONIC MIDLINE LOW BACK PAIN WITHOUT SCIATICA: ICD-10-CM

## 2022-01-31 DIAGNOSIS — I25.10 CORONARY ARTERY DISEASE INVOLVING NATIVE CORONARY ARTERY OF NATIVE HEART WITHOUT ANGINA PECTORIS: ICD-10-CM

## 2022-01-31 DIAGNOSIS — F11.90 CHRONIC NARCOTIC USE: Primary | ICD-10-CM

## 2022-01-31 DIAGNOSIS — F17.200 SMOKER: ICD-10-CM

## 2022-01-31 DIAGNOSIS — J44.9 CHRONIC OBSTRUCTIVE PULMONARY DISEASE, UNSPECIFIED COPD TYPE: ICD-10-CM

## 2022-01-31 DIAGNOSIS — I10 ESSENTIAL HYPERTENSION: ICD-10-CM

## 2022-01-31 DIAGNOSIS — E11.29 CONTROLLED TYPE 2 DIABETES MELLITUS WITH MICROALBUMINURIA, WITHOUT LONG-TERM CURRENT USE OF INSULIN: ICD-10-CM

## 2022-01-31 PROCEDURE — 99214 OFFICE O/P EST MOD 30 MIN: CPT | Mod: S$PBB,,, | Performed by: INTERNAL MEDICINE

## 2022-01-31 PROCEDURE — 99999 PR PBB SHADOW E&M-EST. PATIENT-LVL IV: ICD-10-PCS | Mod: PBBFAC,,, | Performed by: INTERNAL MEDICINE

## 2022-01-31 PROCEDURE — 99214 OFFICE O/P EST MOD 30 MIN: CPT | Mod: PBBFAC,PO | Performed by: INTERNAL MEDICINE

## 2022-01-31 PROCEDURE — 99999 PR PBB SHADOW E&M-EST. PATIENT-LVL IV: CPT | Mod: PBBFAC,,, | Performed by: INTERNAL MEDICINE

## 2022-01-31 PROCEDURE — 99214 PR OFFICE/OUTPT VISIT, EST, LEVL IV, 30-39 MIN: ICD-10-PCS | Mod: S$PBB,,, | Performed by: INTERNAL MEDICINE

## 2022-01-31 RX ORDER — HYDROCODONE BITARTRATE AND ACETAMINOPHEN 10; 325 MG/1; MG/1
1 TABLET ORAL
Qty: 30 TABLET | Refills: 0 | Status: SHIPPED | OUTPATIENT
Start: 2022-02-28 | End: 2022-04-25 | Stop reason: SDUPTHER

## 2022-01-31 RX ORDER — HYDROCODONE BITARTRATE AND ACETAMINOPHEN 10; 325 MG/1; MG/1
1 TABLET ORAL
Qty: 30 TABLET | Refills: 0 | Status: SHIPPED | OUTPATIENT
Start: 2022-03-28 | End: 2022-04-25 | Stop reason: SDUPTHER

## 2022-01-31 RX ORDER — HYDROCODONE BITARTRATE AND ACETAMINOPHEN 10; 325 MG/1; MG/1
1 TABLET ORAL
Qty: 30 TABLET | Refills: 0 | Status: SHIPPED | OUTPATIENT
Start: 2022-01-31 | End: 2022-04-25 | Stop reason: SDUPTHER

## 2022-02-11 ENCOUNTER — PES CALL (OUTPATIENT)
Dept: ADMINISTRATIVE | Facility: CLINIC | Age: 67
End: 2022-02-11
Payer: MEDICAID

## 2022-03-16 DIAGNOSIS — E11.29 CONTROLLED TYPE 2 DIABETES MELLITUS WITH MICROALBUMINURIA, WITHOUT LONG-TERM CURRENT USE OF INSULIN: ICD-10-CM

## 2022-03-16 DIAGNOSIS — R80.9 CONTROLLED TYPE 2 DIABETES MELLITUS WITH MICROALBUMINURIA, WITHOUT LONG-TERM CURRENT USE OF INSULIN: ICD-10-CM

## 2022-04-05 DIAGNOSIS — Z71.89 COMPLEX CARE COORDINATION: ICD-10-CM

## 2022-04-21 ENCOUNTER — PATIENT OUTREACH (OUTPATIENT)
Dept: ADMINISTRATIVE | Facility: HOSPITAL | Age: 67
End: 2022-04-21
Payer: MEDICAID

## 2022-04-21 NOTE — LETTER
AUTHORIZATION FOR RELEASE OF   CONFIDENTIAL INFORMATION        We are seeing Paulino Meadows, date of birth 1955, in the clinic at [facility] FAMILY [facility]/ INTERNAL MED/ PEDS. Rogelio March MD is the patient's PCP. Paulino Meadows has an outstanding lab/procedure at the time we reviewed his chart. In order to help keep his health information updated, he has authorized us to request the following medical record(s):        (  )  MAMMOGRAM                                      (  )  COLONOSCOPY      (  )  PAP SMEAR                                          (  )  OUTSIDE LAB RESULTS     (  )  DEXA SCAN                                          ( X ) DIABETIC EYE EXAM           (  )  FOOT EXAM                                          (  )  ENTIRE RECORD     (  )  OUTSIDE IMMUNIZATIONS                 (  )  _______________         Please fax records to [facility], Marvin P Dair, MD,  FAX [phone] [address]. [place]. [id number]       If you have any questions, please contact Lashon Garcia MA,[facility] at [phone].             Patient Name: Paulino Meadows  : 1955  Patient Phone #: [phone]

## 2022-04-25 NOTE — PROGRESS NOTES
This note was created by combination of typed  and M-Modal dictation.  Transcription errors may be present.  If there are any questions, please contact me.    Assessment and Plan:   Chronic narcotic use  Chronic midline low back pain without sciatica; by report, 9/8/2011 MRI lumbar spine schmorl's nodes and ligamentous hypertrophy, root impingment noted  - queried, no aberrancy  Taking prescription narcotic as prescribed  Intensive monitoring of high risk medication.  Issue with e-prescribing - unable to send - printed this month's rx.  Anticipate he/pharmacy will need to contact me for future refills.  -     HYDROcodone-acetaminophen (NORCO)  mg per tablet; Take 1 tablet by mouth every 24 hours as needed for Pain.  Dispense: 30 tablet; Refill: 0    Unable to exempt him from jury duty.    Medications Discontinued During This Encounter   Medication Reason    HYDROcodone-acetaminophen (NORCO)  mg per tablet Reorder    HYDROcodone-acetaminophen (NORCO)  mg per tablet Reorder    HYDROcodone-acetaminophen (NORCO)  mg per tablet Reorder    HYDROcodone-acetaminophen (NORCO)  mg per tablet Reorder    HYDROcodone-acetaminophen (NORCO)  mg per tablet Reorder    HYDROcodone-acetaminophen (NORCO)  mg per tablet Reorder    HYDROcodone-acetaminophen (NORCO)  mg per tablet Reorder       meds sent this encounter:  Medications Ordered This Encounter   Medications    HYDROcodone-acetaminophen (NORCO)  mg per tablet     Sig: Take 1 tablet by mouth every 24 hours as needed for Pain.     Dispense:  30 tablet     Refill:  0     Quantity prescribed more than 7 day supply? Yes, quantity medically necessary     Order Specific Question:   I have reviewed the Prescription Drug Monitoring Program (PDMP) database for this patient prior to prescribing the above opioid medication     Answer:   Yes    HYDROcodone-acetaminophen (NORCO)  mg per tablet     Sig: Take 1  tablet by mouth every 24 hours as needed for Pain.     Dispense:  30 tablet     Refill:  0     Quantity prescribed more than 7 day supply? Yes, quantity medically necessary     Order Specific Question:   I have reviewed the Prescription Drug Monitoring Program (PDMP) database for this patient prior to prescribing the above opioid medication     Answer:   Yes    HYDROcodone-acetaminophen (NORCO)  mg per tablet     Sig: Take 1 tablet by mouth every 24 hours as needed for Pain.     Dispense:  30 tablet     Refill:  0     Quantity prescribed more than 7 day supply? Yes, quantity medically necessary     Order Specific Question:   I have reviewed the Prescription Drug Monitoring Program (PDMP) database for this patient prior to prescribing the above opioid medication     Answer:   Yes       Follow Up: No follow-ups on file. OV 3 months.    Subjective:     Chief Complaint   Patient presents with    Chronic Pain     F/u        ALEKSANDER Bob is a 66 y.o. male, last appointment with this clinic was 1/31/2022.  Social History     Tobacco Use    Smoking status: Current Every Day Smoker     Packs/day: 0.25     Types: Cigarettes    Smokeless tobacco: Never Used   Substance Use Topics    Alcohol use: Not Currently      Social History     Occupational History    Occupation: disabled - orthopedic      Social History     Social History Narrative    Not on file       Last visit me in late January  Chronic back pain with chronic narcotic use.  Stable.  Smoker, COPD, diabetes, coronary artery disease, [facility]    Last labs 10/2021:  10/26/2021 A1c 7.1  Lipid TC 87 TG 87 HDL 39 LDL 30  CBC normal  CMP creatinine 1.1     3/28    [employer] in [place] [employer].  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    Takes pain medication daily.  In the AM.  Denies SE. Feels like lasts most of the day.  Keeps him functional.  No issues no constipation, no nausea, no dysphoria, no  insomnia.    He is asking for jury duty excuse.  Unable to provide.  Given that the medication allows him to stay functional (which is appropriate) I cannot exempt him on the basis of chronic pain.    Patient Care Team:  Rogelio March MD as PCP - General (Internal Medicine)  Fermin Lee Jr., MD as Consulting Physician (Family Medicine)  Lashon Garcia MA as Care Coordinator  [facility]  Priyanka Guerra MD (Internal Medicine)  Fito Bowles Sr., MD as Consulting Physician (Ophthalmology)  Rock Quijano MD (Cardiology)    Patient Active Problem List    Diagnosis Date Noted    Common bile duct dilatation on CT and MRI, tumor markers negative, no further imaging 07/29/2021    Ventral hernia without obstruction or gangrene 06/08/2020    Anemia of chronic disease on labs 9/2019 09/11/2019    GERD (gastroesophageal reflux disease) 05/14/2019    COPD (chronic obstructive pulmonary disease) with reported hx of abn PFTs 05/14/2019    Primary osteoarthritis of both knees hx of injection 05/14/2019    History of hepatitis C s/p treatment per pt.  [facility], reported MRI liver 8/2018 08/29/2018    Idiopathic gout of multiple sites 11/24/2017    Chronic midline low back pain without sciatica; by report, 9/8/2011 MRI lumbar spine schmorl's nodes and ligamentous hypertrophy, root impingment noted 10/24/2017    Chronic narcotic use 10/24/2017    Essential hypertension 10/24/2017    Controlled type 2 diabetes mellitus with microalbuminuria, without long-term current use of insulin 10/24/2017    Coronary artery disease involving native coronary artery of native heart without angina pectoris 10/24/2017    Benign prostatic hyperplasia without lower urinary tract symptoms 10/24/2017    Smoker 10/24/2017       PAST MEDICAL PROBLEMS, PAST SURGICAL HISTORY: please see relevant portions of the electronic medical record    ALLERGIES AND MEDICATIONS: updated and reviewed.  Review of  patient's allergies indicates:   Allergen Reactions    Ace inhibitors     Codeine        Medication List with Changes/Refills   Current Medications    ALBUTEROL (PROVENTIL/VENTOLIN HFA) 90 MCG/ACTUATION INHALER    INHALE 2 PUFFS BY MOUTH EVERY FOUR HOURS AS NEEDED AS A RESCUE INHALER    ALLOPURINOL (ZYLOPRIM) 100 MG TABLET    Take 1 tablet (100 mg total) by mouth once daily. Decreased dose    AMLODIPINE (NORVASC) 2.5 MG TABLET    Take 1 tablet (2.5 mg total) by mouth once daily.    ASPIRIN (ECOTRIN) 81 MG EC TABLET    Take 81 mg by mouth once daily.    CARVEDILOL (COREG) 12.5 MG TABLET    Take 12.5 mg by mouth 2 (two) times a day.    CLOPIDOGREL (PLAVIX) 75 MG TABLET    Take 75 mg by mouth once daily.    EMPAGLIFLOZIN (JARDIANCE) 25 MG TABLET    Take 12.5 mg by mouth once daily.    FLUTICASONE PROPIONATE (FLONASE) 50 MCG/ACTUATION NASAL SPRAY        HYDROCODONE-ACETAMINOPHEN (NORCO)  MG PER TABLET    Take 1 tablet by mouth every 24 hours as needed for Pain.    HYDROCODONE-ACETAMINOPHEN (NORCO)  MG PER TABLET    Take 1 tablet by mouth every 24 hours as needed for Pain.    HYDROCODONE-ACETAMINOPHEN (NORCO)  MG PER TABLET    Take 1 tablet by mouth every 24 hours as needed for Pain.    LIDOCAINE (LIDODERM) 5 %    APPLY 3 PATCHES TOPICALLY EVERY DAY FOR PAIN. WEAR FOR 12 HOURS, THEN REMOVE. DO NOT APPLY NEW PATCH FOR AT LEAST 12 HOURS.    LOSARTAN (COZAAR) 100 MG TABLET    Take 1 tablet by mouth once daily.    METFORMIN (FORTAMET) 500 MG 24HR TABLET    Take 1,000 mg by mouth 2 (two) times a day.    METHYL SALICYLATE-MENTHOL 15-10% 15-10 % CREA    APPLY MODERATE AMOUNT TOPICALLY ONCE DAILY AS NEEDED    NARCAN 4 MG/ACTUATION SPRY        OMEPRAZOLE (PRILOSEC) 20 MG CAPSULE    Take 40 mg by mouth once daily.     ROSUVASTATIN (CRESTOR) 40 MG TAB    Take 20 mg by mouth every evening.    SILDENAFIL (VIAGRA) 50 MG TABLET    TAKE ONE-HALF TABLET BY MOUTH EVERY SEVEN DAYS AS NEEDED 30 TO 60 MINUTES BEFORE  "INTERCOURSE FOR BEST RESULTS TAKE ON EMPTY STOMACH    TIOTROPIUM BROMIDE (SPIRIVA RESPIMAT) 1.25 MCG/ACTUATION INHALER    INHALE 2 PUFFS BY MOUTH EVERY DAY        Objective:   Physical Exam   Vitals:    04/26/22 1002   BP: 132/74   Pulse: 67   Temp: 98 °F (36.7 °C)   TempSrc: Oral   SpO2: 96%   Weight: 82.1 kg (181 lb)   Height: 6' 2" (1.88 m)    Body mass index is 23.24 kg/m².            Physical Exam  Constitutional:       General: He is not in acute distress.     Appearance: He is well-developed.   Cardiovascular:      Rate and Rhythm: Normal rate and regular rhythm.      Heart sounds: Normal heart sounds. No murmur heard.  Pulmonary:      Effort: Pulmonary effort is normal.      Breath sounds: Normal breath sounds.   Musculoskeletal:         General: Normal range of motion.      Right lower leg: No edema.      Left lower leg: No edema.   Skin:     General: Skin is warm and dry.   Neurological:      Mental Status: He is alert and oriented to person, place, and time.      Coordination: Coordination normal.   Psychiatric:         Behavior: Behavior normal.         Thought Content: Thought content normal.         "

## 2022-04-26 ENCOUNTER — OFFICE VISIT (OUTPATIENT)
Dept: FAMILY MEDICINE | Facility: CLINIC | Age: 67
End: 2022-04-26
Payer: MEDICARE

## 2022-04-26 VITALS
BODY MASS INDEX: 23.23 KG/M2 | HEIGHT: 74 IN | WEIGHT: 181 LBS | DIASTOLIC BLOOD PRESSURE: 74 MMHG | TEMPERATURE: 98 F | SYSTOLIC BLOOD PRESSURE: 132 MMHG | HEART RATE: 67 BPM | OXYGEN SATURATION: 96 %

## 2022-04-26 DIAGNOSIS — M54.50 CHRONIC MIDLINE LOW BACK PAIN WITHOUT SCIATICA: ICD-10-CM

## 2022-04-26 DIAGNOSIS — G89.29 CHRONIC MIDLINE LOW BACK PAIN WITHOUT SCIATICA: ICD-10-CM

## 2022-04-26 DIAGNOSIS — F11.90 CHRONIC NARCOTIC USE: ICD-10-CM

## 2022-04-26 PROCEDURE — 99214 OFFICE O/P EST MOD 30 MIN: CPT | Mod: S$PBB,,, | Performed by: INTERNAL MEDICINE

## 2022-04-26 PROCEDURE — 99214 PR OFFICE/OUTPT VISIT, EST, LEVL IV, 30-39 MIN: ICD-10-PCS | Mod: S$PBB,,, | Performed by: INTERNAL MEDICINE

## 2022-04-26 PROCEDURE — 99999 PR PBB SHADOW E&M-EST. PATIENT-LVL IV: ICD-10-PCS | Mod: PBBFAC,,, | Performed by: INTERNAL MEDICINE

## 2022-04-26 PROCEDURE — 99999 PR PBB SHADOW E&M-EST. PATIENT-LVL IV: CPT | Mod: PBBFAC,,, | Performed by: INTERNAL MEDICINE

## 2022-04-26 PROCEDURE — 99214 OFFICE O/P EST MOD 30 MIN: CPT | Mod: PBBFAC,PO | Performed by: INTERNAL MEDICINE

## 2022-04-26 RX ORDER — HYDROCODONE BITARTRATE AND ACETAMINOPHEN 10; 325 MG/1; MG/1
1 TABLET ORAL
Qty: 30 TABLET | Refills: 0 | Status: SHIPPED | OUTPATIENT
Start: 2022-05-25 | End: 2022-05-25 | Stop reason: SDUPTHER

## 2022-04-26 RX ORDER — HYDROCODONE BITARTRATE AND ACETAMINOPHEN 10; 325 MG/1; MG/1
1 TABLET ORAL
Qty: 30 TABLET | Refills: 0 | Status: SHIPPED | OUTPATIENT
Start: 2022-04-26 | End: 2022-04-26 | Stop reason: SDUPTHER

## 2022-04-26 RX ORDER — HYDROCODONE BITARTRATE AND ACETAMINOPHEN 10; 325 MG/1; MG/1
1 TABLET ORAL
Qty: 30 TABLET | Refills: 0 | Status: SHIPPED | OUTPATIENT
Start: 2022-06-25 | End: 2022-07-25 | Stop reason: SDUPTHER

## 2022-04-26 RX ORDER — HYDROCODONE BITARTRATE AND ACETAMINOPHEN 10; 325 MG/1; MG/1
1 TABLET ORAL
Qty: 30 TABLET | Refills: 0 | Status: SHIPPED | OUTPATIENT
Start: 2022-06-25 | End: 2022-04-26 | Stop reason: SDUPTHER

## 2022-04-26 RX ORDER — HYDROCODONE BITARTRATE AND ACETAMINOPHEN 10; 325 MG/1; MG/1
1 TABLET ORAL
Qty: 30 TABLET | Refills: 0 | Status: SHIPPED | OUTPATIENT
Start: 2022-04-26 | End: 2022-05-25 | Stop reason: SDUPTHER

## 2022-04-26 RX ORDER — HYDROCODONE BITARTRATE AND ACETAMINOPHEN 10; 325 MG/1; MG/1
1 TABLET ORAL
Qty: 30 TABLET | Refills: 0 | Status: SHIPPED | OUTPATIENT
Start: 2022-05-25 | End: 2022-04-26 | Stop reason: SDUPTHER

## 2022-04-28 ENCOUNTER — TELEPHONE (OUTPATIENT)
Dept: FAMILY MEDICINE | Facility: CLINIC | Age: 67
End: 2022-04-28
Payer: MEDICAID

## 2022-04-28 NOTE — TELEPHONE ENCOUNTER
Left a voicemail message for the Patient to call the office back @ [phone] to be schedule for an Enhanced Annual Wellness Visit (EAWV)

## 2022-05-25 DIAGNOSIS — M54.50 CHRONIC MIDLINE LOW BACK PAIN WITHOUT SCIATICA: ICD-10-CM

## 2022-05-25 DIAGNOSIS — F11.90 CHRONIC NARCOTIC USE: ICD-10-CM

## 2022-05-25 DIAGNOSIS — G89.29 CHRONIC MIDLINE LOW BACK PAIN WITHOUT SCIATICA: ICD-10-CM

## 2022-05-25 RX ORDER — HYDROCODONE BITARTRATE AND ACETAMINOPHEN 10; 325 MG/1; MG/1
1 TABLET ORAL
Qty: 30 TABLET | Refills: 0 | Status: CANCELLED | OUTPATIENT
Start: 2022-05-25

## 2022-05-25 NOTE — TELEPHONE ENCOUNTER
No new care gaps identified.  [facility] Embedded Care Gaps. Reference number: [id number]. 5/25/2022   9:38:04 AM CHENGT

## 2022-05-25 NOTE — TELEPHONE ENCOUNTER
I called [facility] and was told that the patient hydrocodone wasn't received for a refill. I was told that the patient last prescription for April was written. They would like to know can it be sent electronically for this month and June. Please advise!

## 2022-05-25 NOTE — TELEPHONE ENCOUNTER
----- Message from Bar Del Rio MA sent at 5/25/2022  2:26 PM CDT -----  Type: Patient Call Back    Who called:Self    What is the request in detail:pt. States his medication was supposed to be sent to the pharmacy and it has not ... HYDROcodone-acetaminophen (NORCO)  mg per tablet 30 tablet     Can the clinic reply by [facility]?no    Would the patient rather a call back or a response via [facility]? yes    Best call back number:[phone]

## 2022-05-25 NOTE — TELEPHONE ENCOUNTER
Your message have been received, will inform physician and get back to you with more information. thanks

## 2022-05-25 NOTE — TELEPHONE ENCOUNTER
----- Message from Lina Collazo sent at 5/25/2022  9:30 AM CDT -----  Regarding: Self/  [phone]  Type: RX Refill Request    Who Called:  Patient    Refill or New Rx:  Refill    RX Name and Strength:  HYDROcodone-acetaminophen (NORCO)  mg per tablet    Preferred Pharmacy with phone number:  [facility] - [place], [place] - [address]    Local or Mail Order:  Local    Ordering Provider:  MIRNA March    Would the patient rather a call back or a response via [facility]?  Call back    Best Call Back Number:  [phone] (home)                  Chief complaint:   No chief complaint on file.      Vitals:  There were no vitals taken for this visit.    HISTORY OF PRESENT ILLNESS     HPI   Patient is here for 6 month follow up of AF, s/p DCCV 4/23/18. He continues on amiodarone 100mg daily and eliquis.         Other significant problems:  Patient Active Problem List    Diagnosis Date Noted   • Junctional bradycardia 10/17/2018     Priority: Low   • Acute renal failure superimposed on stage 3 chronic kidney disease (CMS/HCC) 10/17/2018     Priority: Low   • Hyperkalemia 10/17/2018     Priority: Low   • Chronic kidney disease (CKD), stage III (moderate) (CMS/[facility]) 10/15/2018     Priority: Low   • Lower urinary tract symptoms (LUTS) 07/24/2018     Priority: Low   • Snoring 07/12/2018     Priority: Low   • Encounter for monitoring amiodarone therapy 05/27/2018     Priority: Low   • Anticoagulant long-term use - Eliquis 04/03/2018     Priority: Low   • Atrial fibrillation (CMS/HCC) 03/30/2018     Priority: Low     Echocardiogram: 3/13/18  LVEF: 55%  LA size:59 ml/m2  IVS: 1.2 cm  LVPW:1.0 cm  CHADs score: 5 (AGE, HTN, DM, CAD)  Anticoagulation: Eliquis  Antiarrhythmic:        • Angina pectoris (CMS/HCC) 04/13/2017     Priority: Low     PO HEART CATH 4/13/2017 RONNI TORRES MD        1999 CABG GRAFTS OPEN     LIMA TO D 1 + LAD           VEIN TO PD + M 1        • Type 2 diabetes mellitus with diabetic polyneuropathy, with long-term current use of insulin (CMS/HCC) 08/02/2016     Priority: Low   • Spinal stenosis of lumbar region with neurogenic claudication 04/27/2016     Priority: Low   • Osteoarthritis of spine with radiculopathy, lumbar region 04/27/2016     Priority: Low   • Postprocedural membranous urethral stricture 04/04/2016     Priority: Low   • Intermittent claudication (CMS/HCC) 11/09/2015     Priority: Low   • Achilles bursitis or tendinitis 12/17/2014     Priority: Low   • Acute cholecystitis 10/05/2014     Priority: Low   • Other hammer toe (acquired)  02/04/2014     Priority: Low   • Dermatophytosis of nail 09/23/2013     Priority: Low   • Pain in limb 09/23/2013     Priority: Low   • Spinal stenosis, lumbar region, without neurogenic claudication 10/18/2012     Priority: Low     With neurogenic claudication     • Diabetic neuropathy (CMS/HCC) 08/03/2012     Priority: Low   • Thrombocytopenia, unspecified 11/12/2010     Priority: Low   • Benign localized hyperplasia of prostate without urinary obstruction and other lower urinary tract symptoms (LUTS) 11/12/2009     Priority: Low   • Gout, unspecified 11/12/2009     Priority: Low   • Essential hypertension 09/30/2008     Priority: Low   • Other and unspecified hyperlipidemia 09/30/2008     Priority: Low   • Coronary atherosclerosis of unspecified type of vessel, native or graft 09/30/2008     Priority: Low     PO HEART CATH 4/13/2017 RONNI TORRES MD        1999 CABG GRAFTS OPEN     LIMA TO D 1 + LAD           VEIN TO PD + M 1          PAST MEDICAL, FAMILY AND SOCIAL HISTORY     Medications:  Current Outpatient Medications   Medication   • DIAZepam (VALIUM) 5 MG tablet   • gabapentin (NEURONTIN) 300 MG capsule   • metformin (GLUCOPHAGE) 1000 MG tablet   • allopurinol (ZYLOPRIM) 300 MG tablet   • blood glucose (ONETOUCH VERIO) test strip   • amiodarone (PACERONE,CORDARONE) 100 MG tablet   • HYDROcodone-acetaminophen (NORCO) 5-325 MG per tablet   • pantoprazole (PROTONIX) 40 MG tablet   • Insulin Pen Needle (PEN NEEDLES 5/16\") 31G X 8 MM Misc   • amLODIPine (NORVASC) 5 MG tablet   • atorvastatin (LIPITOR) 40 MG tablet   • BASAGLAR KWIKPEN 100 UNIT/ML pen-injector   • clopidogrel (PLAVIX) 75 MG tablet   • losartan (COZAAR) 50 MG tablet   • metFORMIN (GLUCOPHAGE) 500 MG tablet   • apixaban (ELIQUIS) 5 MG Tab   • isosorbide mononitrate (IMDUR) 30 MG 24 hr tablet   • Ranolazine (RANEXA) 1000 MG TABLET SR 12 HR   • ascorbic acid (VITAMIN C) 1000 MG tablet   • fish oil-omega-3 fatty acids 1000 MG CAPS   • Coenzyme Q10  (CO Q 10 PO)     No current facility-administered medications for this visit.        Allergies:  ALLERGIES:  No Known Allergies    Past Medical  History/Surgeries:  Past Medical History:   Diagnosis Date   • Anxiety state, unspecified    • Arthritis     gouty arthritis   • Bilateral cataracts    • BPH (benign prostatic hypertrophy)    • CAD (coronary artery disease) 1999    hx of CABG, stents 2001 and 2007   • Chronic pain     back   • Diabetic retinopathy (CMS/HCC)     diabetic macular edema   • ED (erectile dysfunction)    • Fracture    • Gout    • HLD (hyperlipidemia)    • HTN (hypertension)    • Malignant neoplasm (CMS/HCC)     SKIN BACK- REMOVED   • PONV (postoperative nausea and vomiting)    • Type II or unspecified type diabetes mellitus without mention of complication, not stated as uncontrolled     IDDM   • Urinary tract infection    • Vitreous hemorrhage of right eye (CMS/HCC) 8/2015       Past Surgical History:   Procedure Laterality Date   • Abdomen surgery     • Cardiac surgery      CABG 1999 quadruple bypass   • Cardioversion  04/23/2018   • Colonoscopy w biopsy  6/22/2011    Screen, 3 rectal polyps, hyperplastic, recall in 10 yrs per Dr Cavanaugh   • Cystourethroscopy  04/18/2018    with urethral dilitatiom   • Eye surgery  2004    BILATERAL CATARACT SURGERY   • Eye surgery  2004    VITRECTOMY, L   • Foot/toes surgery proc unlisted  9/3/2003    Toe- Titanium implant   • Laminectomy,lumbar  10/24/1995    L4-S1   • Nose surgery      submucous resection   • Ptca      PTCA/ STENT 2000; 2007   • Removal gallbladder  2015   • Skin biopsy     • Sternal debridement  8/26/1999    AFTER CABG; FLAP   • Tonsillectomy     • Transurethral resec bladder neck     • Ulnar nerve transposition  12/19/1997    Lunar nerve transposition on right elbow       Family History:  Family History   Problem Relation Age of Onset   • Other Father         liver   • Heart Mother         MI   • Diabetes Mother    • Hypertension Brother    •  Heart Brother         PPM/ICD       Social History:  Social History     Tobacco Use   • Smoking status: Never Smoker   • Smokeless tobacco: Never Used   Substance Use Topics   • Alcohol use: No     Alcohol/week: 0.0 oz     Comment: Occasional       REVIEW OF SYSTEMS     Review of Systems    PHYSICAL EXAM     Physical Exam    ASSESSMENT/PLAN     ***

## 2022-05-25 NOTE — TELEPHONE ENCOUNTER
----- Message from Uday Fairchild sent at 5/25/2022  1:29 PM CDT -----  Regarding: status of a refill  Type: Patient Call Back    Who called: Paulino     What is the request in detail: the patient is calling to check on the status of a refill for: hydrocodone/acetaminophen. Once sent to the pharmacy, patient would like a call back to notify him that it is there.     Can the clinic reply by [facility]?no    Would the patient rather a call back or a response via [facility]? Call back    Best call back number: [phone]

## 2022-05-25 NOTE — TELEPHONE ENCOUNTER
----- Message from Roselyn Cadena sent at 5/25/2022  3:27 PM CDT -----  Type: RX Refill Request    Who Called:  [facility] [phone]    Have you contacted your pharmacy:    Refill or New Rx:HYDROcodone-acetaminophen (NORCO)  mg per tablet    RX Name and Strength:    How is the patient currently taking it? (ex. 1XDay):    Is this a 30 day or 90 day RX:    Preferred Pharmacy with phone number:    Local or Mail Order:    Ordering Provider:    Would the patient rather a call back or a response via [facility]?     Best Call Back Number:    Additional Information:

## 2022-05-25 NOTE — TELEPHONE ENCOUNTER
----- Message from Lina Collazo sent at 5/25/2022  9:30 AM CDT -----  Regarding: Self/  [phone]  Type: RX Refill Request    Who Called:  Patient    Refill or New Rx:  Refill    RX Name and Strength:  HYDROcodone-acetaminophen (NORCO)  mg per tablet    Preferred Pharmacy with phone number:  [facility] - [place], [place] - [address]    Local or Mail Order:  Local    Ordering Provider:  MIRNA March    Would the patient rather a call back or a response via [facility]?  Call back    Best Call Back Number:  [phone] (home)

## 2022-05-25 NOTE — TELEPHONE ENCOUNTER
No new care gaps identified.  [facility] Embedded Care Gaps. Reference number: [id number]. 5/25/2022   2:36:42 PM CDT

## 2022-05-26 RX ORDER — HYDROCODONE BITARTRATE AND ACETAMINOPHEN 10; 325 MG/1; MG/1
1 TABLET ORAL
Qty: 30 TABLET | Refills: 0 | Status: SHIPPED | OUTPATIENT
Start: 2022-06-26 | End: 2022-07-26 | Stop reason: SDUPTHER

## 2022-05-26 RX ORDER — HYDROCODONE BITARTRATE AND ACETAMINOPHEN 10; 325 MG/1; MG/1
1 TABLET ORAL
Qty: 30 TABLET | Refills: 0 | Status: SHIPPED | OUTPATIENT
Start: 2022-05-26 | End: 2022-07-26 | Stop reason: SDUPTHER

## 2022-06-23 ENCOUNTER — TELEPHONE (OUTPATIENT)
Dept: FAMILY MEDICINE | Facility: CLINIC | Age: 67
End: 2022-06-23
Payer: MEDICARE

## 2022-06-23 NOTE — TELEPHONE ENCOUNTER
----- Message from Roselyn Cadena sent at 6/23/2022  3:45 PM CDT -----  Type: RX Refill Request    Who Called: pt    Have you contacted your pharmacy:    Refill or New Rx:HYDROcodone-acetaminophen (NORCO)  mg per tablet    RX Name and Strength:    How is the patient currently taking it? (ex. 1XDay):    Is this a 30 day or 90 day RX:    Preferred Pharmacy with phone number:  [facility] - [place] [address]  [address] [place] [address]  Phone: [phone] Fax: [phone]        Local or Mail Order:    Ordering Provider:    Would the patient rather a call back or a response via [facility]? call    Best Call Back Number:[phone] (home)       Additional Information:

## 2022-06-24 ENCOUNTER — TELEPHONE (OUTPATIENT)
Dept: FAMILY MEDICINE | Facility: CLINIC | Age: 67
End: 2022-06-24
Payer: MEDICARE

## 2022-06-24 NOTE — TELEPHONE ENCOUNTER
----- Message from Merly Gabriel sent at 6/24/2022  2:35 PM CDT -----  Type: Patient Call Back    Who called:self    What is the request in detail:Pt's prescription for HYDROcodone-acetaminophen (NORCO)  mg per tablet for tomorrow did to go through to pharmacy.    Can the clinic reply by [facility]?no    Would the patient rather a call back or a response via [facility]? call    Best call back number:[phone] (home)

## 2022-07-13 DIAGNOSIS — E11.9 TYPE 2 DIABETES MELLITUS WITHOUT COMPLICATION, UNSPECIFIED WHETHER LONG TERM INSULIN USE: ICD-10-CM

## 2022-07-13 DIAGNOSIS — E11.9 TYPE 2 DIABETES MELLITUS WITHOUT COMPLICATION: ICD-10-CM

## 2022-07-25 NOTE — PROGRESS NOTES
This note was created by combination of typed  and M-Modal dictation.  Transcription errors may be present.  If there are any questions, please contact me.    Assessment and Plan:   Chronic narcotic use  Chronic midline low back pain without sciatica; by report, 9/8/2011 MRI lumbar spine schmorl's nodes and ligamentous hypertrophy, root impingment noted  Long term (current) use of opiate analgesic   - queried, no aberrancy  Taking prescription narcotic as prescribed  Intensive monitoring of high risk medication.  -     HYDROcodone-acetaminophen (NORCO)  mg per tablet; Take 1 tablet by mouth every 24 hours as needed for Pain.  Dispense: 30 tablet; Refill: 0  -     Toxicology Screen, Urine; Future; Expected date: 07/26/2022  -     HYDROcodone-acetaminophen (NORCO)  mg per tablet; Take 1 tablet by mouth every 24 hours as needed for Pain.  Dispense: 30 tablet; Refill: 0  -     HYDROcodone-acetaminophen (NORCO)  mg per tablet; Take 1 tablet by mouth every 24 hours as needed for Pain.  Dispense: 30 tablet; Refill: 0  -     Toxicology Screen, Urine; Future; Expected date: 07/26/2022          Medications Discontinued During This Encounter   Medication Reason    glipiZIDE (GLUCOTROL) 5 MG tablet     empagliflozin (JARDIANCE) 25 mg tablet Therapy completed    amLODIPine (NORVASC) 2.5 MG tablet     budesonide-formoterol 160-4.5 mcg (SYMBICORT) 160-4.5 mcg/actuation HFAA     cyclobenzaprine (FLEXERIL) 10 MG tablet     fluticasone propionate (FLONASE) 50 mcg/actuation nasal spray     ibuprofen (ADVIL,MOTRIN) 600 MG tablet     irbesartan (AVAPRO) 300 MG tablet     naproxen (NAPROSYN) 500 MG tablet     penicillin v potassium (VEETID) 500 MG tablet     HYDROcodone-acetaminophen (NORCO)  mg per tablet Reorder    HYDROcodone-acetaminophen (NORCO)  mg per tablet Reorder    HYDROcodone-acetaminophen (NORCO)  mg per tablet Reorder       meds sent this encounter:  Medications  Ordered This Encounter   Medications    HYDROcodone-acetaminophen (NORCO)  mg per tablet     Sig: Take 1 tablet by mouth every 24 hours as needed for Pain.     Dispense:  30 tablet     Refill:  0     Quantity prescribed more than 7 day supply? Yes, quantity medically necessary     Order Specific Question:   I have reviewed the Prescription Drug Monitoring Program (PDMP) database for this patient prior to prescribing the above opioid medication     Answer:   Yes    HYDROcodone-acetaminophen (NORCO)  mg per tablet     Sig: Take 1 tablet by mouth every 24 hours as needed for Pain.     Dispense:  30 tablet     Refill:  0     Quantity prescribed more than 7 day supply? Yes, quantity medically necessary     Order Specific Question:   I have reviewed the Prescription Drug Monitoring Program (PDMP) database for this patient prior to prescribing the above opioid medication     Answer:   Yes    HYDROcodone-acetaminophen (NORCO)  mg per tablet     Sig: Take 1 tablet by mouth every 24 hours as needed for Pain.     Dispense:  30 tablet     Refill:  0     Quantity prescribed more than 7 day supply? Yes, quantity medically necessary     Order Specific Question:   I have reviewed the Prescription Drug Monitoring Program (PDMP) database for this patient prior to prescribing the above opioid medication     Answer:   Yes       Follow Up: No follow-ups on file. OV 3 months.     Subjective:     Chief Complaint   Patient presents with    Medication Refill       HPI  Paulino is a 66 y.o. male, last appointment with this clinic was 4/26/2022.  Social History     Tobacco Use    Smoking status: Current Every Day Smoker     Packs/day: 0.25     Types: Cigarettes    Smokeless tobacco: Never Used   Substance Use Topics    Alcohol use: Not Currently      Social History     Occupational History    Occupation: disabled - orthopedic      Social History     Social History Narrative    Not on file       Last visit with me in  April  Chronic back pain with chronic narcotic use.  Smoker, COPD, diabetes, coronary artery disease, [facility]    No recent labs since last visit  Last labs 10/2021:  10/26/2021 A1c 7.1  Lipid TC 87 TG 87 HDL 39 LDL 30  CBC normal  CMP creatinine 1.1     6/27, 5/26, 4/26, 3/28    Taking medication regularly.   Albuterol  Allopurinol 100  No longer taking amlodipine  ASA  Coreg 12.5 bid  plavix  Not taking jardiance  Losartan 100   Metformin 500 bid  Omeprazole 20  crestor 20  viagra 50  spiriva    Denies obvious side effects of the medication.  No constipation, no mood disorder, no insomnia.  finds the dose effective.  Has been taking longstanding.  Did not take medications this morning.  Did take it yesterday.  Is agreeable for UDS.  Denies illicits.    Patient Care Team:  Rogelio March MD as PCP - General (Internal Medicine)  Fermin Lee Jr., MD as Consulting Physician (Family Medicine)  Lashon Garcia MA as Care Coordinator  [facility]  Priyanka Guerra MD (Internal Medicine)  Fito Bowles Sr., MD as Consulting Physician (Ophthalmology)  Rock Quijano MD (Cardiology)    Patient Active Problem List    Diagnosis Date Noted    Common bile duct dilatation on CT and MRI, tumor markers negative, no further imaging 07/29/2021    Ventral hernia without obstruction or gangrene 06/08/2020    Anemia of chronic disease on labs 9/2019 09/11/2019    GERD (gastroesophageal reflux disease) 05/14/2019    COPD (chronic obstructive pulmonary disease) with reported hx of abn PFTs 05/14/2019    Primary osteoarthritis of both knees hx of injection 05/14/2019    History of hepatitis C s/p treatment per pt.  [facility], reported [facility] liver 8/2018 08/29/2018    Idiopathic gout of multiple sites 11/24/2017    Chronic midline low back pain without sciatica; by report, 9/8/2011 MRI lumbar spine schmorl's nodes and ligamentous hypertrophy, root impingment noted  10/24/2017    Chronic narcotic use 10/24/2017    Essential hypertension 10/24/2017    Controlled type 2 diabetes mellitus with microalbuminuria, without long-term current use of insulin 10/24/2017    Coronary artery disease involving native coronary artery of native heart without angina pectoris 10/24/2017    Benign prostatic hyperplasia without lower urinary tract symptoms 10/24/2017    Smoker 10/24/2017       PAST MEDICAL PROBLEMS, PAST SURGICAL HISTORY: please see relevant portions of the electronic medical record    ALLERGIES AND MEDICATIONS: updated and reviewed.  Review of patient's allergies indicates:   Allergen Reactions    Ace inhibitors     Codeine        Medication List with Changes/Refills   Current Medications    ALBUTEROL (PROVENTIL/VENTOLIN HFA) 90 MCG/ACTUATION INHALER    INHALE 2 PUFFS BY MOUTH EVERY FOUR HOURS AS NEEDED AS A RESCUE INHALER    ALLOPURINOL (ZYLOPRIM) 100 MG TABLET    Take 1 tablet (100 mg total) by mouth once daily. Decreased dose    AMLODIPINE (NORVASC) 2.5 MG TABLET    Take 1 tablet (2.5 mg total) by mouth once daily.    ASPIRIN (ECOTRIN) 81 MG EC TABLET    Take 81 mg by mouth once daily.    BUDESONIDE-FORMOTEROL 160-4.5 MCG (SYMBICORT) 160-4.5 MCG/ACTUATION HFAA    Symbicort 160 mcg-4.5 mcg/actuation HFA aerosol inhaler   Inhale 2 puffs twice a day by inhalation route for 90 days.    CARVEDILOL (COREG) 12.5 MG TABLET    Take 12.5 mg by mouth 2 (two) times a day.    CETIRIZINE (ZYRTEC) 10 MG TABLET    cetirizine 10 mg tablet   Take 1 tablet every day by oral route as needed for 30 days.    CLOPIDOGREL (PLAVIX) 75 MG TABLET    Take 75 mg by mouth once daily.    CYCLOBENZAPRINE (FLEXERIL) 10 MG TABLET    cyclobenzaprine 10 mg tablet    DICLOFENAC (CATAFLAM) 50 MG TABLET    diclofenac potassium 50 mg tablet   Take 1 tablet 3 times a day by oral route with meals for 10 days.    EMPAGLIFLOZIN (JARDIANCE) 25 MG TABLET    Take 12.5 mg by mouth once daily.    FLUTICASONE  "PROPIONATE (FLONASE) 50 MCG/ACTUATION NASAL SPRAY        GLIPIZIDE (GLUCOTROL) 5 MG TABLET    glipizide 5 mg tablet    HYDROCODONE-ACETAMINOPHEN (NORCO)  MG PER TABLET    Take 1 tablet by mouth every 24 hours as needed for Pain.    HYDROCODONE-ACETAMINOPHEN (NORCO)  MG PER TABLET    Take 1 tablet by mouth every 24 hours as needed for Pain.    HYDROCODONE-ACETAMINOPHEN (NORCO)  MG PER TABLET    Take 1 tablet by mouth every 24 hours as needed for Pain.    IBUPROFEN (ADVIL,MOTRIN) 600 MG TABLET    ibuprofen 600 mg tablet    IRBESARTAN (AVAPRO) 300 MG TABLET    irbesartan 300 mg tablet    LIDOCAINE (LIDODERM) 5 %    APPLY 3 PATCHES TOPICALLY EVERY DAY FOR PAIN. WEAR FOR 12 HOURS, THEN REMOVE. DO NOT APPLY NEW PATCH FOR AT LEAST 12 HOURS.    LOSARTAN (COZAAR) 100 MG TABLET    Take 1 tablet by mouth once daily.    METFORMIN (FORTAMET) 500 MG 24HR TABLET    Take 1,000 mg by mouth 2 (two) times a day.    METHYL SALICYLATE-MENTHOL 15-10% 15-10 % CREA    APPLY MODERATE AMOUNT TOPICALLY ONCE DAILY AS NEEDED    NAPROXEN (NAPROSYN) 500 MG TABLET    naproxen 500 mg tablet    NARCAN 4 MG/ACTUATION SPRY        OMEPRAZOLE (PRILOSEC) 20 MG CAPSULE    Take 40 mg by mouth once daily.     PENICILLIN V POTASSIUM (VEETID) 500 MG TABLET    penicillin V potassium 500 mg tablet    ROSUVASTATIN (CRESTOR) 40 MG TAB    Take 20 mg by mouth every evening.    SILDENAFIL (VIAGRA) 50 MG TABLET    TAKE ONE-HALF TABLET BY MOUTH EVERY SEVEN DAYS AS NEEDED 30 TO 60 MINUTES BEFORE INTERCOURSE FOR BEST RESULTS TAKE ON EMPTY STOMACH    TIOTROPIUM BROMIDE (SPIRIVA RESPIMAT) 1.25 MCG/ACTUATION INHALER    INHALE 2 PUFFS BY MOUTH EVERY DAY        Objective:   Physical Exam   Vitals:    07/26/22 0927   BP: 130/72   Pulse: 86   Temp: 98.5 °F (36.9 °C)   TempSrc: Oral   SpO2: 96%   Weight: 84.4 kg (186 lb 1.1 oz)   Height: 6' 2" (1.88 m)    Body mass index is 23.89 kg/m².  Weight: 84.4 kg (186 lb 1.1 oz)   Height: 6' 2" (188 cm)     Physical " Exam  Constitutional:       General: He is not in acute distress.     Appearance: He is well-developed.   Cardiovascular:      Rate and Rhythm: Normal rate and regular rhythm.      Heart sounds: Normal heart sounds. No murmur heard.  Pulmonary:      Effort: Pulmonary effort is normal.      Breath sounds: Normal breath sounds.   Musculoskeletal:         General: Normal range of motion.   Skin:     General: Skin is warm and dry.   Neurological:      Mental Status: He is alert and oriented to person, place, and time.      Coordination: Coordination normal.   Psychiatric:         Behavior: Behavior normal.         Thought Content: Thought content normal.

## 2022-07-26 ENCOUNTER — LAB VISIT (OUTPATIENT)
Dept: LAB | Facility: HOSPITAL | Age: 67
End: 2022-07-26
Attending: INTERNAL MEDICINE
Payer: MEDICARE

## 2022-07-26 ENCOUNTER — OFFICE VISIT (OUTPATIENT)
Dept: FAMILY MEDICINE | Facility: CLINIC | Age: 67
End: 2022-07-26
Payer: MEDICARE

## 2022-07-26 VITALS
OXYGEN SATURATION: 96 % | SYSTOLIC BLOOD PRESSURE: 130 MMHG | WEIGHT: 186.06 LBS | DIASTOLIC BLOOD PRESSURE: 72 MMHG | HEIGHT: 74 IN | BODY MASS INDEX: 23.88 KG/M2 | HEART RATE: 86 BPM | TEMPERATURE: 99 F

## 2022-07-26 DIAGNOSIS — F11.90 CHRONIC NARCOTIC USE: ICD-10-CM

## 2022-07-26 DIAGNOSIS — M54.50 CHRONIC MIDLINE LOW BACK PAIN WITHOUT SCIATICA: ICD-10-CM

## 2022-07-26 DIAGNOSIS — Z79.891 LONG TERM (CURRENT) USE OF OPIATE ANALGESIC: ICD-10-CM

## 2022-07-26 DIAGNOSIS — G89.29 CHRONIC MIDLINE LOW BACK PAIN WITHOUT SCIATICA: ICD-10-CM

## 2022-07-26 LAB
AMPHET+METHAMPHET UR QL: NEGATIVE
BARBITURATES UR QL SCN>200 NG/ML: NEGATIVE
BENZODIAZ UR QL SCN>200 NG/ML: NEGATIVE
BZE UR QL SCN: NEGATIVE
CANNABINOIDS UR QL SCN: NEGATIVE
CREAT UR-MCNC: 149 MG/DL (ref 23–375)
ETHANOL UR-MCNC: <10 MG/DL
METHADONE UR QL SCN>300 NG/ML: NEGATIVE
OPIATES UR QL SCN: ABNORMAL
PCP UR QL SCN>25 NG/ML: NEGATIVE
TOXICOLOGY INFORMATION: ABNORMAL

## 2022-07-26 PROCEDURE — 99999 PR PBB SHADOW E&M-EST. PATIENT-LVL IV: ICD-10-PCS | Mod: PBBFAC,,, | Performed by: INTERNAL MEDICINE

## 2022-07-26 PROCEDURE — 99999 PR PBB SHADOW E&M-EST. PATIENT-LVL IV: CPT | Mod: PBBFAC,,, | Performed by: INTERNAL MEDICINE

## 2022-07-26 PROCEDURE — 99214 OFFICE O/P EST MOD 30 MIN: CPT | Mod: S$PBB,,, | Performed by: INTERNAL MEDICINE

## 2022-07-26 PROCEDURE — 99214 PR OFFICE/OUTPT VISIT, EST, LEVL IV, 30-39 MIN: ICD-10-PCS | Mod: S$PBB,,, | Performed by: INTERNAL MEDICINE

## 2022-07-26 PROCEDURE — 80307 DRUG TEST PRSMV CHEM ANLYZR: CPT | Performed by: INTERNAL MEDICINE

## 2022-07-26 PROCEDURE — 99214 OFFICE O/P EST MOD 30 MIN: CPT | Mod: PBBFAC,PO | Performed by: INTERNAL MEDICINE

## 2022-07-26 RX ORDER — HYDROCODONE BITARTRATE AND ACETAMINOPHEN 10; 325 MG/1; MG/1
1 TABLET ORAL
Qty: 30 TABLET | Refills: 0 | Status: SHIPPED | OUTPATIENT
Start: 2022-09-26 | End: 2022-10-27 | Stop reason: SDUPTHER

## 2022-07-26 RX ORDER — IBUPROFEN 600 MG/1
TABLET ORAL
COMMUNITY
End: 2022-07-26

## 2022-07-26 RX ORDER — NAPROXEN 500 MG/1
TABLET ORAL
COMMUNITY
End: 2022-07-26

## 2022-07-26 RX ORDER — DICLOFENAC POTASSIUM 50 MG/1
TABLET, FILM COATED ORAL
Status: ON HOLD | COMMUNITY
End: 2022-11-09 | Stop reason: HOSPADM

## 2022-07-26 RX ORDER — CETIRIZINE HYDROCHLORIDE 10 MG/1
TABLET ORAL
COMMUNITY

## 2022-07-26 RX ORDER — IRBESARTAN 300 MG/1
TABLET ORAL
COMMUNITY
End: 2022-07-26

## 2022-07-26 RX ORDER — HYDROCODONE BITARTRATE AND ACETAMINOPHEN 10; 325 MG/1; MG/1
1 TABLET ORAL
Qty: 30 TABLET | Refills: 0 | Status: SHIPPED | OUTPATIENT
Start: 2022-08-26 | End: 2022-10-27 | Stop reason: SDUPTHER

## 2022-07-26 RX ORDER — GLIPIZIDE 5 MG/1
TABLET ORAL
COMMUNITY
End: 2022-07-26

## 2022-07-26 RX ORDER — PENICILLIN V POTASSIUM 500 MG/1
TABLET, FILM COATED ORAL
COMMUNITY
End: 2022-07-26

## 2022-07-26 RX ORDER — CYCLOBENZAPRINE HCL 10 MG
TABLET ORAL
COMMUNITY
End: 2022-07-26

## 2022-07-26 RX ORDER — BUDESONIDE AND FORMOTEROL FUMARATE DIHYDRATE 160; 4.5 UG/1; UG/1
AEROSOL RESPIRATORY (INHALATION)
COMMUNITY
End: 2022-07-26

## 2022-07-26 RX ORDER — HYDROCODONE BITARTRATE AND ACETAMINOPHEN 10; 325 MG/1; MG/1
1 TABLET ORAL
Qty: 30 TABLET | Refills: 0 | Status: SHIPPED | OUTPATIENT
Start: 2022-07-26 | End: 2022-10-27 | Stop reason: SDUPTHER

## 2022-10-24 ENCOUNTER — TELEPHONE (OUTPATIENT)
Dept: FAMILY MEDICINE | Facility: CLINIC | Age: 67
End: 2022-10-24
Payer: MEDICARE

## 2022-10-24 NOTE — TELEPHONE ENCOUNTER
----- Message from Mitali Engel sent at 10/24/2022  8:22 AM CDT -----  Contact: TRIPP CISNEROS [[id number]]  Type: Call Back      Who called: TRIPP CISNEROS [[id number]]      What is the request in detail: Patient is requesting a call back. Pt states that his prescriptions run out tomorrow and he would like to know if he can be seen sooner. Please advise.       Can the clinic reply by [facility]? No      Would the patient rather a call back or a response via [facility]? Call back       Best call back number: [phone] (home)       Additional Information:

## 2022-10-27 NOTE — PROGRESS NOTES
This note was created by combination of typed  and M-Modal dictation.  Transcription errors may be present.  If there are any questions, please contact me.    Assessment and Plan:   Chronic narcotic use  Chronic midline low back pain without sciatica; by report, 9/8/2011 MRI lumbar spine schmorl's nodes and ligamentous hypertrophy, root impingment noted  - queried, no aberrancy  Taking prescription narcotic as prescribed  Intensive monitoring of high risk medication.  -     HYDROcodone-acetaminophen (NORCO)  mg per tablet; Take 1 tablet by mouth every 24 hours as needed for Pain.  Dispense: 30 tablet; Refill: 0  -     HYDROcodone-acetaminophen (NORCO)  mg per tablet; Take 1 tablet by mouth every 24 hours as needed for Pain.  Dispense: 30 tablet; Refill: 0  -     HYDROcodone-acetaminophen (NORCO)  mg per tablet; Take 1 tablet by mouth every 24 hours as needed for Pain.  Dispense: 30 tablet; Refill: 0    Diabetes   -[facility]  Reports he had labs done a couple of weeks ago.  I am unable to pull up the results of that up in care everywhere   Reports that he [facility].      Medications Discontinued During This Encounter   Medication Reason    HYDROcodone-acetaminophen (NORCO)  mg per tablet Reorder    HYDROcodone-acetaminophen (NORCO)  mg per tablet Reorder    HYDROcodone-acetaminophen (NORCO)  mg per tablet Reorder       meds sent this encounter:  Medications Ordered This Encounter   Medications    HYDROcodone-acetaminophen (NORCO)  mg per tablet     Sig: Take 1 tablet by mouth every 24 hours as needed for Pain.     Dispense:  30 tablet     Refill:  0     Quantity prescribed more than 7 day supply? Yes, quantity medically necessary     Order Specific Question:   I have reviewed the Prescription Drug Monitoring Program (PDMP) database for this patient prior to prescribing the above opioid medication     Answer:   Yes     HYDROcodone-acetaminophen (NORCO)  mg per tablet     Sig: Take 1 tablet by mouth every 24 hours as needed for Pain.     Dispense:  30 tablet     Refill:  0     Quantity prescribed more than 7 day supply? Yes, quantity medically necessary     Order Specific Question:   I have reviewed the Prescription Drug Monitoring Program (PDMP) database for this patient prior to prescribing the above opioid medication     Answer:   Yes    HYDROcodone-acetaminophen (NORCO)  mg per tablet     Sig: Take 1 tablet by mouth every 24 hours as needed for Pain.     Dispense:  30 tablet     Refill:  0     Quantity prescribed more than 7 day supply? Yes, quantity medically necessary     Order Specific Question:   I have reviewed the Prescription Drug Monitoring Program (PDMP) database for this patient prior to prescribing the above opioid medication     Answer:   Yes       Follow Up: No follow-ups on file. OV 3 months.    Subjective:     Chief Complaint   Patient presents with    medication mangement       HPI  Paulino is a 66 y.o. male, last appointment with this clinic was 7/26/2022.  Social History     Tobacco Use    Smoking status: Every Day     Packs/day: 0.25     Types: Cigarettes    Smokeless tobacco: Never   Substance Use Topics    Alcohol use: Not Currently      Social History     Occupational History    Occupation: disabled - orthopedic      Social History     Social History Narrative    Not on file       Last visit me in late July   Chronic back pain with chronic narcotic use.  Stable  Smoker, COPD, diabetes, coronary artery disease, hypertension [facility]    ED visit 09/07 for dizziness.  Head CT negative.  Metabolic panel with hyperglycemia.  Spontaneous improvement.    On albuterol   Allopurinol 100  Aspirin 81   Carvedilol 12.5 b.i.d.  Plavix  Losartan 100  Omeprazole 40 once daily  Rosuvastatin 20   Sildenafil 25    [facility] that I can find, 10/2021.  A1c at that time 7.1     9/26       He has been out of his medications for a couple of days.  Denies constipation  Denies depression  No insomnia  Finds narcotic dose effective  Keeps him functional.    Spiriva uses PRN    Recalls [facility] about 3 weeks ago.  And [facility] as [facility].     I am unable to pull up those results.      Patient Care Team:  Rogelio March MD as PCP - General (Internal Medicine)  Fermin Lee Jr., MD as Consulting Physician (Family Medicine)  Lashon Garcia MA as Care Coordinator  [facility]  Priyanka Guerra MD (Internal Medicine)  Fito Bowles Sr., MD as Consulting Physician (Ophthalmology)  Rock Quijano MD (Cardiology)    Patient Active Problem List    Diagnosis Date Noted    Common bile duct dilatation on CT and MRI, tumor markers negative, no further imaging 07/29/2021    Ventral hernia without obstruction or gangrene 06/08/2020    Anemia of chronic disease on labs 9/2019 09/11/2019    GERD (gastroesophageal reflux disease) 05/14/2019    COPD (chronic obstructive pulmonary disease) with reported hx of abn PFTs 05/14/2019    Primary osteoarthritis of both knees hx of injection 05/14/2019    History of hepatitis C s/p treatment per pt.  [facility], reported MRI liver 8/2018 08/29/2018    Idiopathic gout of multiple sites 11/24/2017    Chronic midline low back pain without sciatica; by report, 9/8/2011 MRI lumbar spine schmorl's nodes and ligamentous hypertrophy, root impingment noted 10/24/2017    Chronic narcotic use 10/24/2017    Essential hypertension 10/24/2017    Controlled type 2 diabetes mellitus with microalbuminuria, without long-term current use of insulin 10/24/2017    Coronary artery disease involving native coronary artery of native heart without angina pectoris 10/24/2017    Benign prostatic hyperplasia without lower urinary tract symptoms 10/24/2017    Smoker 10/24/2017       PAST MEDICAL PROBLEMS, PAST SURGICAL HISTORY: please see relevant  portions of the electronic medical record    ALLERGIES AND MEDICATIONS: updated and reviewed.  Review of patient's allergies indicates:   Allergen Reactions    Ace inhibitors     Codeine        Medication List with Changes/Refills   Current Medications    ALBUTEROL (PROVENTIL/VENTOLIN HFA) 90 MCG/ACTUATION INHALER    INHALE 2 PUFFS BY MOUTH EVERY FOUR HOURS AS NEEDED AS A RESCUE INHALER    ALLOPURINOL (ZYLOPRIM) 100 MG TABLET    Take 1 tablet (100 mg total) by mouth once daily. Decreased dose    ASPIRIN (ECOTRIN) 81 MG EC TABLET    Take 81 mg by mouth once daily.    CARVEDILOL (COREG) 12.5 MG TABLET    Take 12.5 mg by mouth 2 (two) times a day.    CETIRIZINE (ZYRTEC) 10 MG TABLET    cetirizine 10 mg tablet   Take 1 tablet every day by oral route as needed for 30 days.    CLOPIDOGREL (PLAVIX) 75 MG TABLET    Take 75 mg by mouth once daily.    DICLOFENAC (CATAFLAM) 50 MG TABLET    diclofenac potassium 50 mg tablet   Take 1 tablet 3 times a day by oral route with meals for 10 days.    HYDROCODONE-ACETAMINOPHEN (NORCO)  MG PER TABLET    Take 1 tablet by mouth every 24 hours as needed for Pain.    HYDROCODONE-ACETAMINOPHEN (NORCO)  MG PER TABLET    Take 1 tablet by mouth every 24 hours as needed for Pain.    HYDROCODONE-ACETAMINOPHEN (NORCO)  MG PER TABLET    Take 1 tablet by mouth every 24 hours as needed for Pain.    LIDOCAINE (LIDODERM) 5 %    APPLY 3 PATCHES TOPICALLY EVERY DAY FOR PAIN. WEAR FOR 12 HOURS, THEN REMOVE. DO NOT APPLY NEW PATCH FOR AT LEAST 12 HOURS.    LOSARTAN (COZAAR) 100 MG TABLET    Take 1 tablet by mouth once daily.    METFORMIN (FORTAMET) 500 MG 24HR TABLET    Take 500 mg by mouth 2 (two) times daily with meals.    METHYL SALICYLATE-MENTHOL 15-10% 15-10 % CREA    APPLY MODERATE AMOUNT TOPICALLY ONCE DAILY AS NEEDED    NARCAN 4 MG/ACTUATION SPRY        OMEPRAZOLE (PRILOSEC) 20 MG CAPSULE    Take 40 mg by mouth once daily.     ROSUVASTATIN (CRESTOR) 40 MG TAB    Take 20 mg by  mouth every evening.    SILDENAFIL (VIAGRA) 50 MG TABLET    TAKE ONE-HALF TABLET BY MOUTH EVERY SEVEN DAYS AS NEEDED 30 TO 60 MINUTES BEFORE INTERCOURSE FOR BEST RESULTS TAKE ON EMPTY STOMACH    TIOTROPIUM BROMIDE (SPIRIVA RESPIMAT) 1.25 MCG/ACTUATION INHALER    INHALE 2 PUFFS BY MOUTH EVERY DAY        Objective:   Physical Exam   Vitals:    10/28/22 0937   BP: (!) 126/58   BP Location: Left arm   Patient Position: Sitting   BP Method: Large (Manual)   Pulse: 73   Temp: 98 °F (36.7 °C)   TempSrc: Oral   SpO2: 95%   Weight: 81.2 kg (179 lb)    Body mass index is 22.98 kg/m².            Physical Exam  Constitutional:       General: He is not in acute distress.     Appearance: He is well-developed.   Eyes:      Extraocular Movements: Extraocular movements intact.   Cardiovascular:      Rate and Rhythm: Normal rate and regular rhythm.      Heart sounds: Normal heart sounds. No murmur heard.  Pulmonary:      Effort: Pulmonary effort is normal.      Breath sounds: Normal breath sounds.   Musculoskeletal:         General: Normal range of motion.      Right lower leg: No edema.      Left lower leg: No edema.   Skin:     General: Skin is warm and dry.   Neurological:      Mental Status: He is alert and oriented to person, place, and time.      Coordination: Coordination normal.   Psychiatric:         Behavior: Behavior normal.         Thought Content: Thought content normal.

## 2022-10-28 ENCOUNTER — OFFICE VISIT (OUTPATIENT)
Dept: FAMILY MEDICINE | Facility: CLINIC | Age: 67
End: 2022-10-28
Payer: MEDICARE

## 2022-10-28 VITALS
SYSTOLIC BLOOD PRESSURE: 126 MMHG | HEART RATE: 73 BPM | BODY MASS INDEX: 22.98 KG/M2 | DIASTOLIC BLOOD PRESSURE: 58 MMHG | TEMPERATURE: 98 F | OXYGEN SATURATION: 95 % | WEIGHT: 179 LBS

## 2022-10-28 DIAGNOSIS — F11.90 CHRONIC NARCOTIC USE: ICD-10-CM

## 2022-10-28 DIAGNOSIS — M54.50 CHRONIC MIDLINE LOW BACK PAIN WITHOUT SCIATICA: ICD-10-CM

## 2022-10-28 DIAGNOSIS — G89.29 CHRONIC MIDLINE LOW BACK PAIN WITHOUT SCIATICA: ICD-10-CM

## 2022-10-28 PROCEDURE — 99999 PR PBB SHADOW E&M-EST. PATIENT-LVL IV: CPT | Mod: PBBFAC,,, | Performed by: INTERNAL MEDICINE

## 2022-10-28 PROCEDURE — 99214 OFFICE O/P EST MOD 30 MIN: CPT | Mod: S$PBB,,, | Performed by: INTERNAL MEDICINE

## 2022-10-28 PROCEDURE — 99214 OFFICE O/P EST MOD 30 MIN: CPT | Mod: PBBFAC,PO | Performed by: INTERNAL MEDICINE

## 2022-10-28 PROCEDURE — 99214 PR OFFICE/OUTPT VISIT, EST, LEVL IV, 30-39 MIN: ICD-10-PCS | Mod: S$PBB,,, | Performed by: INTERNAL MEDICINE

## 2022-10-28 PROCEDURE — 99999 PR PBB SHADOW E&M-EST. PATIENT-LVL IV: ICD-10-PCS | Mod: PBBFAC,,, | Performed by: INTERNAL MEDICINE

## 2022-10-28 RX ORDER — HYDROCODONE BITARTRATE AND ACETAMINOPHEN 10; 325 MG/1; MG/1
1 TABLET ORAL
Qty: 30 TABLET | Refills: 0 | Status: SHIPPED | OUTPATIENT
Start: 2022-12-27 | End: 2023-01-18 | Stop reason: SDUPTHER

## 2022-10-28 RX ORDER — HYDROCODONE BITARTRATE AND ACETAMINOPHEN 10; 325 MG/1; MG/1
1 TABLET ORAL
Qty: 30 TABLET | Refills: 0 | Status: SHIPPED | OUTPATIENT
Start: 2022-11-27 | End: 2023-02-02 | Stop reason: SDUPTHER

## 2022-10-28 RX ORDER — HYDROCODONE BITARTRATE AND ACETAMINOPHEN 10; 325 MG/1; MG/1
1 TABLET ORAL
Qty: 30 TABLET | Refills: 0 | Status: SHIPPED | OUTPATIENT
Start: 2022-10-28 | End: 2023-02-02 | Stop reason: SDUPTHER

## 2022-10-30 PROBLEM — A41.9 SEPTIC SHOCK: Status: ACTIVE | Noted: 2022-10-30

## 2022-10-30 PROBLEM — J69.0 ASPIRATION PNEUMONIA OF LEFT LUNG: Status: ACTIVE | Noted: 2022-10-30

## 2022-10-30 PROBLEM — E87.20 LACTIC ACIDOSIS: Status: ACTIVE | Noted: 2022-10-30

## 2022-10-30 PROBLEM — R65.21 SEPTIC SHOCK: Status: ACTIVE | Noted: 2022-10-30

## 2022-10-30 PROBLEM — N17.9 AKI (ACUTE KIDNEY INJURY): Status: ACTIVE | Noted: 2022-10-30

## 2022-10-30 PROBLEM — Z71.6 TOBACCO ABUSE COUNSELING: Status: ACTIVE | Noted: 2022-10-30

## 2022-10-31 PROBLEM — R04.2 HEMOPTYSIS: Status: ACTIVE | Noted: 2022-10-31

## 2022-11-02 PROBLEM — I47.29 NSVT (NONSUSTAINED VENTRICULAR TACHYCARDIA): Status: ACTIVE | Noted: 2022-11-02

## 2022-11-02 PROBLEM — A41.9 SEPTIC SHOCK: Status: RESOLVED | Noted: 2022-10-30 | Resolved: 2022-11-02

## 2022-11-02 PROBLEM — E87.20 LACTIC ACIDOSIS: Status: RESOLVED | Noted: 2022-10-30 | Resolved: 2022-11-02

## 2022-11-02 PROBLEM — N17.9 AKI (ACUTE KIDNEY INJURY): Status: RESOLVED | Noted: 2022-10-30 | Resolved: 2022-11-02

## 2022-11-02 PROBLEM — J18.9 LEFT LOWER LOBE PNEUMONIA: Status: ACTIVE | Noted: 2022-11-02

## 2022-11-02 PROBLEM — I48.91 NEW ONSET ATRIAL FIBRILLATION: Status: ACTIVE | Noted: 2022-11-02

## 2022-11-02 PROBLEM — J69.0 ASPIRATION PNEUMONIA OF LEFT LUNG: Status: RESOLVED | Noted: 2022-10-30 | Resolved: 2022-11-02

## 2022-11-02 PROBLEM — R65.21 SEPTIC SHOCK: Status: RESOLVED | Noted: 2022-10-30 | Resolved: 2022-11-02

## 2022-11-02 PROBLEM — R04.2 HEMOPTYSIS: Status: RESOLVED | Noted: 2022-10-31 | Resolved: 2022-11-02

## 2022-11-03 DIAGNOSIS — Z71.89 COMPLEX CARE COORDINATION: ICD-10-CM

## 2022-11-04 PROBLEM — I42.9 CARDIOMYOPATHY: Status: ACTIVE | Noted: 2022-11-04

## 2022-11-08 PROBLEM — I47.20 VT (VENTRICULAR TACHYCARDIA): Status: ACTIVE | Noted: 2022-11-02

## 2022-11-10 ENCOUNTER — OUTPATIENT CASE MANAGEMENT (OUTPATIENT)
Dept: ADMINISTRATIVE | Facility: OTHER | Age: 67
End: 2022-11-10
Payer: MEDICARE

## 2022-11-10 ENCOUNTER — PATIENT OUTREACH (OUTPATIENT)
Dept: ADMINISTRATIVE | Facility: CLINIC | Age: 67
End: 2022-11-10
Payer: MEDICARE

## 2022-11-10 NOTE — PROGRESS NOTES
C3 nurse attempted to contact Paulino Meadows for a TCC post hospital discharge follow up call. No answer. Left voicemail with callback information. The patient has a scheduled HOSFU appointment with Rogelio March MD on 11/15/2022 @ 0900.

## 2022-11-10 NOTE — PROGRESS NOTES
Outpatient Care Management  Patient Not Qualified    Patient: Paulino Meadows  MRN:  [id number]  Date of Service:  11/30/2022  Completed by:  Katarzyna Lewis RN    Chief Complaint   Patient presents with    OPCM Chart Review     11/10/22    OPCM RN First Assessment Attempt     11/10/22    OPCM RN Second Assessment Attempt     11/11/22    OPCM RN Third Assessment Attempt     11/16/22, 11/22/22    Case Closure     11/30/22       Patient Summary     Program:  OPCM High Risk  Qualification Status:  No  Reason Not Qualified:  Unable to reach      11/30/22 No response from patient. Closing case.    11/22/22 4th attempt to complete initial assessment for Outpatient Care Management: Left message for patient requesting a return call. Letter has been sent to patient with [facility] contact information. Will close case if patient does not respond.     11/16/22 3rd attempt to complete initial assessment for Outpatient Care Management: Left message for patient requesting a return call. Letter has been sent to patient with [facility] contact information. Will close case if patient does not respond.     11/11/22 2nd attempt to complete initial assessment for Outpatient Care Management: Left message for patient requesting a return call. Will attempt to contact patient again at a later date.     11/10/22 1st attempt to complete initial assessment for [facility]: Left message for patient requesting a return call. Letter has been sent to patient with [facility] contact information. Will attempt to contact patient again at a later date.

## 2022-11-10 NOTE — LETTER
November 10, 2022    Paulino Meadows  [address]  [place] [address]             [facility]  [address]  [facility] [address] Dear  Paulino Dori,    I work with [facility] Outpatient Case Management Department. We received a referral to call you to discuss your medical history. These services are free of charge and are offered to [facility] patients who have recently been discharged from any of our facilities or who have complex medical conditions that may require the skill of a nurse to assist with management.    I am a Registered Nurse who specializes in connecting patients with available medical and financial resources as well as addressing any educational needs that may be indicated.    I attempted to reach you by telephone, but I was unsuccessful. Please call our department so that we can go over some questions with you regarding your health.    The Outpatient Case Management Department can be reached at [phone] from 8:00 AM to 4:30 PM on Monday through Friday. [facility] also has a program where a nurse is available 24/7 to answer questions or provide medical advice. Their number is [phone].      Kind Regards,    Katarzyna Lewis RN  Outpatient Case Management  [phone]

## 2023-01-18 ENCOUNTER — TELEPHONE (OUTPATIENT)
Dept: FAMILY MEDICINE | Facility: CLINIC | Age: 68
End: 2023-01-18
Payer: MEDICARE

## 2023-01-18 DIAGNOSIS — G89.29 CHRONIC MIDLINE LOW BACK PAIN WITHOUT SCIATICA: ICD-10-CM

## 2023-01-18 DIAGNOSIS — M54.50 CHRONIC MIDLINE LOW BACK PAIN WITHOUT SCIATICA: ICD-10-CM

## 2023-01-18 DIAGNOSIS — F11.90 CHRONIC NARCOTIC USE: ICD-10-CM

## 2023-01-18 RX ORDER — HYDROCODONE BITARTRATE AND ACETAMINOPHEN 10; 325 MG/1; MG/1
1 TABLET ORAL
Qty: 30 TABLET | Refills: 0 | Status: SHIPPED | OUTPATIENT
Start: 2023-01-26 | End: 2023-02-02 | Stop reason: SDUPTHER

## 2023-01-18 NOTE — TELEPHONE ENCOUNTER
Called patient and left a message that a prescription was called into his pharmacy and he needs to keep his appointment in February.

## 2023-01-18 NOTE — TELEPHONE ENCOUNTER
Patient stated that he sees his pcp for chronic pain medication every 3 months. He is scheduled on 2/2/23 however will run out on medication almost a week before his scheduled appt. There is no sooner appointments before his scheduled date. I add patient to waitlist.   stable

## 2023-01-18 NOTE — TELEPHONE ENCOUNTER
1227, 11/28   So he will be due 1/26  I will send in that prescription.  Keep the appointment in February

## 2023-01-18 NOTE — TELEPHONE ENCOUNTER
----- Message from Bar Del Rio MA sent at 1/18/2023 11:30 AM CST -----  Type: Patient Call Back    Who called:Self    What is the request in detail: pt. Is asking to be scheduled sooner, stating his medication will run out ..     Can the clinic reply by [facility]?No    Would the patient rather a call back or a response via [facility]? yes    Best call back number: [phone] (home)

## 2023-02-01 PROBLEM — I85.00 ESOPHAGEAL VARICES WITHOUT BLEEDING, UNSPECIFIED ESOPHAGEAL VARICES TYPE: Status: ACTIVE | Noted: 2023-02-01

## 2023-02-01 PROBLEM — H25.812 COMBINED FORMS OF AGE-RELATED CATARACT, LEFT EYE: Status: ACTIVE | Noted: 2023-02-01

## 2023-02-01 PROBLEM — I70.0 AORTIC ATHEROSCLEROSIS: Status: ACTIVE | Noted: 2023-02-01

## 2023-02-01 PROBLEM — N52.9 ERECTILE DYSFUNCTION: Status: ACTIVE | Noted: 2023-02-01

## 2023-02-02 ENCOUNTER — OFFICE VISIT (OUTPATIENT)
Dept: FAMILY MEDICINE | Facility: CLINIC | Age: 68
End: 2023-02-02
Payer: MEDICARE

## 2023-02-02 ENCOUNTER — PATIENT OUTREACH (OUTPATIENT)
Dept: ADMINISTRATIVE | Facility: HOSPITAL | Age: 68
End: 2023-02-02
Payer: MEDICARE

## 2023-02-02 VITALS
DIASTOLIC BLOOD PRESSURE: 74 MMHG | SYSTOLIC BLOOD PRESSURE: 140 MMHG | TEMPERATURE: 99 F | WEIGHT: 182.13 LBS | BODY MASS INDEX: 23.37 KG/M2 | HEIGHT: 74 IN | OXYGEN SATURATION: 94 % | HEART RATE: 72 BPM

## 2023-02-02 DIAGNOSIS — M54.50 CHRONIC MIDLINE LOW BACK PAIN WITHOUT SCIATICA: ICD-10-CM

## 2023-02-02 DIAGNOSIS — I70.0 AORTIC ATHEROSCLEROSIS: ICD-10-CM

## 2023-02-02 DIAGNOSIS — I85.00 ESOPHAGEAL VARICES WITHOUT BLEEDING, UNSPECIFIED ESOPHAGEAL VARICES TYPE: ICD-10-CM

## 2023-02-02 DIAGNOSIS — K21.9 GASTROESOPHAGEAL REFLUX DISEASE, UNSPECIFIED WHETHER ESOPHAGITIS PRESENT: ICD-10-CM

## 2023-02-02 DIAGNOSIS — G89.29 CHRONIC MIDLINE LOW BACK PAIN WITHOUT SCIATICA: ICD-10-CM

## 2023-02-02 DIAGNOSIS — I48.91 ATRIAL FIBRILLATION, UNSPECIFIED TYPE: ICD-10-CM

## 2023-02-02 DIAGNOSIS — J44.9 CHRONIC OBSTRUCTIVE PULMONARY DISEASE, UNSPECIFIED COPD TYPE: ICD-10-CM

## 2023-02-02 DIAGNOSIS — R80.9 CONTROLLED TYPE 2 DIABETES MELLITUS WITH MICROALBUMINURIA, WITHOUT LONG-TERM CURRENT USE OF INSULIN: ICD-10-CM

## 2023-02-02 DIAGNOSIS — I42.9 CARDIOMYOPATHY, UNSPECIFIED TYPE: ICD-10-CM

## 2023-02-02 DIAGNOSIS — F11.90 CHRONIC NARCOTIC USE: Primary | ICD-10-CM

## 2023-02-02 DIAGNOSIS — Z87.01 HISTORY OF BACTERIAL PNEUMONIA: ICD-10-CM

## 2023-02-02 DIAGNOSIS — E11.29 CONTROLLED TYPE 2 DIABETES MELLITUS WITH MICROALBUMINURIA, WITHOUT LONG-TERM CURRENT USE OF INSULIN: ICD-10-CM

## 2023-02-02 PROCEDURE — 99214 OFFICE O/P EST MOD 30 MIN: CPT | Mod: S$PBB,,, | Performed by: INTERNAL MEDICINE

## 2023-02-02 PROCEDURE — 99214 PR OFFICE/OUTPT VISIT, EST, LEVL IV, 30-39 MIN: ICD-10-PCS | Mod: S$PBB,,, | Performed by: INTERNAL MEDICINE

## 2023-02-02 PROCEDURE — 99999 PR PBB SHADOW E&M-EST. PATIENT-LVL IV: CPT | Mod: PBBFAC,,, | Performed by: INTERNAL MEDICINE

## 2023-02-02 PROCEDURE — 99214 OFFICE O/P EST MOD 30 MIN: CPT | Mod: PBBFAC,PO | Performed by: INTERNAL MEDICINE

## 2023-02-02 PROCEDURE — 99999 PR PBB SHADOW E&M-EST. PATIENT-LVL IV: ICD-10-PCS | Mod: PBBFAC,,, | Performed by: INTERNAL MEDICINE

## 2023-02-02 RX ORDER — AMIODARONE HYDROCHLORIDE 200 MG/1
200 TABLET ORAL DAILY
COMMUNITY
End: 2023-05-19 | Stop reason: ALTCHOICE

## 2023-02-02 RX ORDER — HYDROCODONE BITARTRATE AND ACETAMINOPHEN 10; 325 MG/1; MG/1
1 TABLET ORAL
Qty: 30 TABLET | Refills: 0 | Status: SHIPPED | OUTPATIENT
Start: 2023-03-25 | End: 2023-05-19 | Stop reason: SDUPTHER

## 2023-02-02 RX ORDER — HYDROCODONE BITARTRATE AND ACETAMINOPHEN 10; 325 MG/1; MG/1
1 TABLET ORAL
Qty: 30 TABLET | Refills: 0 | Status: SHIPPED | OUTPATIENT
Start: 2023-04-25 | End: 2023-05-18 | Stop reason: SDUPTHER

## 2023-02-02 RX ORDER — HYDROCODONE BITARTRATE AND ACETAMINOPHEN 10; 325 MG/1; MG/1
1 TABLET ORAL
Qty: 30 TABLET | Refills: 0 | Status: SHIPPED | OUTPATIENT
Start: 2023-02-25 | End: 2023-05-19 | Stop reason: SDUPTHER

## 2023-02-02 RX ORDER — OMEPRAZOLE 40 MG/1
40 CAPSULE, DELAYED RELEASE ORAL DAILY
COMMUNITY
End: 2024-01-02 | Stop reason: ALTCHOICE

## 2023-02-02 RX ORDER — CARVEDILOL 12.5 MG/1
12.5 TABLET ORAL 2 TIMES DAILY WITH MEALS
COMMUNITY
End: 2024-01-02 | Stop reason: ALTCHOICE

## 2023-02-02 RX ORDER — LOSARTAN POTASSIUM 100 MG/1
100 TABLET ORAL DAILY
COMMUNITY
End: 2024-01-02

## 2023-02-02 NOTE — PROGRESS NOTES
This note was created by combination of typed  and M-Modal dictation.  Transcription errors may be present.  If there are any questions, please contact me.    Assessment and Plan:   Chronic narcotic use  Chronic midline low back pain without sciatica; by report, 9/8/2011 MRI lumbar spine schmorl's nodes and ligamentous hypertrophy, root impingment noted  - queried, no aberrancy  Taking prescription narcotic as prescribed  Intensive monitoring of high risk medication.  -     HYDROcodone-acetaminophen (NORCO)  mg per tablet; Take 1 tablet by mouth every 24 hours as needed for Pain.  Dispense: 30 tablet; Refill: 0  -     HYDROcodone-acetaminophen (NORCO)  mg per tablet; Take 1 tablet by mouth every 24 hours as needed for Pain.  Dispense: 30 tablet; Refill: 0  -     HYDROcodone-acetaminophen (NORCO)  mg per tablet; Take 1 tablet by mouth every 24 hours as needed for Pain.  Dispense: 30 tablet; Refill: 0    History of bacterial pneumonia  -tells me that [facility]  I do not have access to any imaging results.  Discussed the need to make sure there is complete resolution.  He remains adamant that he has had follow-up imaging done    Cardiomyopathy, unspecified type  Atrial fibrillation, unspecified type  -[facility].  On amiodarone, Eliquis, beta-blocker, ARB.  Recently had 30 day event monitor he reports.  Does not know the results.  Will [facility] to [facility].    Controlled type 2 diabetes mellitus with microalbuminuria, without long-term current use of insulin  -[facility]. on metformin.  Max dose.  On ARB.  On statin.  Last A1c done in December high 7.7.  Reports that he had more recent labs done.  Reports he is due for an eye exam.  Reports [facility]    Aortic atherosclerosis  -on statin.  [facility].    Chronic obstructive pulmonary disease, unspecified COPD type  -[facility].  Spiriva.    Gastroesophageal reflux  disease, unspecified whether esophagitis present  Esophageal varices without bleeding, unspecified esophageal varices type  -history of hepatitis-C status post SVR 2014 per [facility] notation on problem list in Care everywhere.  History of esophageal varices.    On PPI.    [facility]      Medications Discontinued During This Encounter   Medication Reason    omeprazole (PRILOSEC) 20 MG capsule Duplicate Order    losartan (COZAAR) 100 MG tablet Duplicate Order    carvediloL (COREG) 12.5 MG tablet Duplicate Order    amiodarone (PACERONE) 200 MG Tab Duplicate Order    HYDROcodone-acetaminophen (NORCO)  mg per tablet Reorder    HYDROcodone-acetaminophen (NORCO)  mg per tablet Reorder    HYDROcodone-acetaminophen (NORCO)  mg per tablet Reorder       meds sent this encounter:  Medications Ordered This Encounter   Medications    HYDROcodone-acetaminophen (NORCO)  mg per tablet     Sig: Take 1 tablet by mouth every 24 hours as needed for Pain.     Dispense:  30 tablet     Refill:  0     Quantity prescribed more than 7 day supply? Yes, quantity medically necessary     Order Specific Question:   I have reviewed the Prescription Drug Monitoring Program (PDMP) database for this patient prior to prescribing the above opioid medication     Answer:   Yes    HYDROcodone-acetaminophen (NORCO)  mg per tablet     Sig: Take 1 tablet by mouth every 24 hours as needed for Pain.     Dispense:  30 tablet     Refill:  0     Quantity prescribed more than 7 day supply? Yes, quantity medically necessary     Order Specific Question:   I have reviewed the Prescription Drug Monitoring Program (PDMP) database for this patient prior to prescribing the above opioid medication     Answer:   Yes    HYDROcodone-acetaminophen (NORCO)  mg per tablet     Sig: Take 1 tablet by mouth every 24 hours as needed for Pain.     Dispense:  30 tablet     Refill:  0     Quantity prescribed more than 7 day supply? Yes, quantity  medically necessary     Order Specific Question:   I have reviewed the Prescription Drug Monitoring Program (PDMP) database for this patient prior to prescribing the above opioid medication     Answer:   Yes       Follow Up: No follow-ups on file. F/u around May.     Subjective:     Chief Complaint   Patient presents with    Chronic Pain       HPI  Paulino is a 67 y.o. male.     Social History     Tobacco Use    Smoking status: Every Day     Packs/day: 0.25     Types: Cigarettes    Smokeless tobacco: Never   Substance Use Topics    Alcohol use: Not Currently      Social History     Occupational History    Occupation: disabled - orthopedic      Social History     Social History Narrative    Not on file       Last appointment with this clinic was 10/28/2022. Last visit with me 10/28/2022   To summarize last visit and events leading up to today:   chronic back pain, chronic narcotic dependence, stable   [facility]  COPD, smoker   Coronary artery disease  Hypertension    11/9/22 admit [facility] pna with sepsis, NIKOLAS, new AFib, new cardiomyopathy    Per outside claims data, seen in [facility].    On amiodarone 200  Apixaban  Carvedilol 12.5 b.i.d.  Plavix   losartan 100   Metformin XR 1000 b.i.d.  Omeprazole 40  Rosuvastatin 20  Tiotropium inhaler    Outside labs  Order Name Results Value Reference Range Date Interpretation   MICROALBUMIN/CREATININE RATIO PANEL MICROALBUMIN/CREATININE [MASS RATIO] IN URINE 23.3  0.0 - 30.0 12/20/2022     GLYCOLATED HEMOGLOBIN A1C HEMOGLOBIN A1C/HEMOGLOBIN.TOTAL IN BLOOD BY HPLC 7.7  4.2 - 5.8 12/20/2022 H   CHEM 7 CREATININE [MASS/VOLUME] IN SERUM OR PLASMA 1.4  0.6 - 1.3 12/20/2022 H   CHEM 7 UREA NITROGEN [MASS/VOLUME] IN SERUM OR PLASMA 13  7 - 20 12/20/2022     CHEM 7 GLUCOSE [MASS/VOLUME] IN SERUM OR PLASMA 164  70 - 110 12/20/2022 H   CHEM 7 SODIUM [MOLES/VOLUME] IN SERUM OR PLASMA 137  136 - 144 12/20/2022     CHEM 7 POTASSIUM [MOLES/VOLUME] IN SERUM OR PLASMA  4.9  3.6 - 5.1 12/20/2022     CHEM 7 CHLORIDE [MOLES/VOLUME] IN SERUM OR PLASMA 103  101 - 111 12/20/2022     CHEM 7 CARBON DIOXIDE, TOTAL [MOLES/VOLUME] IN SERUM OR PLASMA 27  22 - 32 12/20/2022     CHEM 7 CALCIUM [MASS/VOLUME] IN SERUM OR PLASMA 9.3  8.9 - 10.3 12/20/2022     CHEM 7 GLOMERULAR FILTRATION RATE/1.73 SQ M.PREDICTED [VOLUME RATE/AREA] IN SERUM OR PLASMA BY CREATININE-BASED FORMULA (MDRD) 55    12/20/2022     CBC WITH PLATELET COUNT LEUKOCYTES [#/VOLUME] IN BLOOD BY AUTOMATED COUNT 8.1  4.8 - 10.8 12/20/2022     CBC WITH PLATELET COUNT ERYTHROCYTES [#/VOLUME] IN BLOOD BY AUTOMATED COUNT 3.81  4.50 - 6.10 12/20/2022 L   CBC WITH PLATELET COUNT HEMOGLOBIN [MASS/VOLUME] IN BLOOD 11.1  14.0 - 18.0 12/20/2022 L   CBC WITH PLATELET COUNT HEMATOCRIT [VOLUME FRACTION] OF BLOOD BY AUTOMATED COUNT 33.8  42.0 - 52.0 12/20/2022 L   CBC WITH PLATELET COUNT MCV [ENTITIC VOLUME] BY AUTOMATED COUNT 88.9  81.0 - 98.0 12/20/2022     CBC WITH PLATELET COUNT MCH [ENTITIC MASS] BY AUTOMATED COUNT 29.2  27.0 - 32.6 12/20/2022     CBC WITH PLATELET COUNT MCHC [MASS/VOLUME] BY AUTOMATED COUNT 32.9  32.2 - 34.8 12/20/2022     CBC WITH PLATELET COUNT PLATELETS [#/VOLUME] IN BLOOD BY AUTOMATED COUNT 324  140 - 420 12/20/2022     CBC WITH PLATELET COUNT PLATELET MEAN VOLUME [ENTITIC VOLUME] IN BLOOD BY AUTOMATED COUNT 7.8  7.4 - 10.8 12/20/2022     CBC WITH PLATELET COUNT ERYTHROCYTE DISTRIBUTION WIDTH [RATIO] BY AUTOMATED COUNT 15.6  11.8 - 14.9 12/20/2022 H   LIPID PROFILE TRIGLYCERIDE [MASS/VOLUME] IN SERUM OR PLASMA 37  0 - 200 12/20/2022     LIPID PROFILE CHOLESTEROL IN LDL [MASS/VOLUME] IN SERUM OR PLASMA BY CALCULATION 23.8  0 - 100 12/20/2022     LIPID PROFILE CHOLESTEROL [MASS/VOLUME] IN SERUM OR PLASMA 61  0 - 240 12/20/2022     LIPID PROFILE CHOLESTEROL IN LDL [MASS/VOLUME] IN SERUM OR PLASMA BY DIRECT ASSAY 20    12/20/2022     LIPID PROFILE CHOLESTEROL IN HDL [MASS/VOLUME] IN SERUM OR PLASMA 29.8  40 12/20/2022 L        Today's visit:  Did a 30 day heart monitor  Has f/u later this month to review results  Is taking DOAC.  And beta-blocker.  And losartan.  Was on carvedilol and losartan even prior to the episode of AFib.    Tells me [facility]  And had labs done 1/14.  I don't have access to those results.  Last labs that I can see in Care everywhere was 12/2022    Has not been back fishing  Has been going to the center for activities  No chest pain no palpitations  Energy levels he thinks is okay.    [facility]    Taking the prescription narcotic as prescribed.  Denies issues with medication.  Once daily    Patient Care Team:  Rogelio March MD as PCP - General (Internal Medicine)  Fermin Lee Jr., MD as Consulting Physician (Family Medicine)  Lashon Garcia MA as Care Coordinator  [facility]  Priyanka Guerra MD (Internal Medicine)  Fito Bowles Sr., MD as Consulting Physician (Ophthalmology)  Rock Quijano MD (Cardiology)    Patient Active Problem List    Diagnosis Date Noted    Cardiomyopathy 11/04/2022 11/02/2022 TTE LV mildly enlarged with eccentric hypertrophy moderately decreased systolic function.  LVEF 35%.  Grade 2 diastolic dysfunction.  Segmental left ventricular wall motion abnormalities.  Normal RV size and RV systolic function.      New onset atrial fibrillation in the setting of left lower lobe pneumonia 11/02/2022    VT (ventricular tachycardia) 11/02/2022    Left lower lobe pneumonia 11/02/2022 11/9/22 CT: Large amount of left lower lobe consolidative change consistent with infection and/or aspiration. Centrilobular emphysema.      Tobacco abuse counseling 10/30/2022    Common bile duct dilatation on CT and MRI, tumor markers negative, no further imaging 07/29/2021    Ventral hernia without obstruction or gangrene 06/08/2020    Anemia of chronic disease on labs 9/2019 09/11/2019    GERD (gastroesophageal reflux disease) 05/14/2019    COPD (chronic  obstructive pulmonary disease) with reported hx of abn PFTs 05/14/2019 11/9/22 CT chest: Centrilobular emphysema.      Primary osteoarthritis of both knees hx of injection 05/14/2019    History of hepatitis C s/p treatment per pt.  [facility], reported MRI liver 8/2018 08/29/2018    Idiopathic gout of multiple sites 11/24/2017    Chronic midline low back pain without sciatica; by report, 9/8/2011 MRI lumbar spine schmorl's nodes and ligamentous hypertrophy, root impingment noted 10/24/2017    Chronic narcotic use 10/24/2017    Essential hypertension 10/24/2017    Controlled type 2 diabetes mellitus with microalbuminuria, without long-term current use of insulin 10/24/2017    Coronary artery disease involving native coronary artery of native heart without angina pectoris 10/24/2017    Benign prostatic hyperplasia without lower urinary tract symptoms 10/24/2017    Smoker 10/24/2017       PAST MEDICAL PROBLEMS, PAST SURGICAL HISTORY: please see relevant portions of the electronic medical record    ALLERGIES AND MEDICATIONS: updated and reviewed.  Review of patient's allergies indicates:   Allergen Reactions    Ace inhibitors     Codeine        Medication List with Changes/Refills   Current Medications    ALBUTEROL (PROVENTIL/VENTOLIN HFA) 90 MCG/ACTUATION INHALER    INHALE 2 PUFFS BY MOUTH EVERY FOUR HOURS AS NEEDED AS A RESCUE INHALER    ALLOPURINOL (ZYLOPRIM) 100 MG TABLET    Take 1 tablet (100 mg total) by mouth once daily. Decreased dose    AMIODARONE (PACERONE) 200 MG TAB    Take 1 tablet (200 mg total) by mouth 2 (two) times daily for 7 days, THEN 1 tablet (200 mg total) once daily for 23 days.    APIXABAN (ELIQUIS) 5 MG TAB    Take 1 tablet (5 mg total) by mouth 2 (two) times daily.    ASPIRIN (ECOTRIN) 81 MG EC TABLET    Take 81 mg by mouth once daily.    AZELASTINE (ASTELIN) 137 MCG (0.1 %) NASAL SPRAY    1 spray (137 mcg total) by Nasal route 2 (two) times daily.    CARVEDILOL (COREG) 12.5  "MG TABLET    Take 12.5 mg by mouth 2 (two) times a day.    CETIRIZINE (ZYRTEC) 10 MG TABLET    cetirizine 10 mg tablet   Take 1 tablet every day by oral route as needed for 30 days.    HYDROCODONE-ACETAMINOPHEN (NORCO)  MG PER TABLET    Take 1 tablet by mouth every 24 hours as needed for Pain.    HYDROCODONE-ACETAMINOPHEN (NORCO)  MG PER TABLET    Take 1 tablet by mouth every 24 hours as needed for Pain.    HYDROCODONE-ACETAMINOPHEN (NORCO)  MG PER TABLET    Take 1 tablet by mouth every 24 hours as needed for Pain.    LIDOCAINE (LIDODERM) 5 %    APPLY 3 PATCHES TOPICALLY EVERY DAY FOR PAIN. WEAR FOR 12 HOURS, THEN REMOVE. DO NOT APPLY NEW PATCH FOR AT LEAST 12 HOURS.    LOSARTAN (COZAAR) 100 MG TABLET    Take 1 tablet by mouth once daily.    METFORMIN (FORTAMET) 500 MG 24HR TABLET    Take 500 mg by mouth 2 (two) times daily with meals.    METHYL SALICYLATE-MENTHOL 15-10% 15-10 % CREA    APPLY MODERATE AMOUNT TOPICALLY ONCE DAILY AS NEEDED    NARCAN 4 MG/ACTUATION SPRY        OMEPRAZOLE (PRILOSEC) 20 MG CAPSULE    Take 40 mg by mouth once daily.     ROSUVASTATIN (CRESTOR) 40 MG TAB    Take 20 mg by mouth every evening.    SILDENAFIL (VIAGRA) 50 MG TABLET    TAKE ONE-HALF TABLET BY MOUTH EVERY SEVEN DAYS AS NEEDED 30 TO 60 MINUTES BEFORE INTERCOURSE FOR BEST RESULTS TAKE ON EMPTY STOMACH    TIOTROPIUM BROMIDE (SPIRIVA RESPIMAT) 1.25 MCG/ACTUATION INHALER    INHALE 2 PUFFS BY MOUTH EVERY DAY        Objective:   Physical Exam   Vitals:    02/02/23 0941   BP: (!) 140/74   BP Location: Left arm   Patient Position: Sitting   BP Method: Large (Manual)   Pulse: 72   Temp: 98.7 °F (37.1 °C)   TempSrc: Oral   SpO2: (!) 94%   Weight: 82.6 kg (182 lb 1.6 oz)   Height: 6' 2" (1.88 m)    Body mass index is 23.38 kg/m².            Physical Exam  Constitutional:       General: He is not in acute distress.     Appearance: He is well-developed.   Eyes:      Extraocular Movements: Extraocular movements intact. "   Cardiovascular:      Rate and Rhythm: Normal rate and regular rhythm.      Heart sounds: Normal heart sounds. No murmur heard.  Pulmonary:      Effort: Pulmonary effort is normal.      Breath sounds: Normal breath sounds.   Musculoskeletal:         General: Normal range of motion.      Right lower leg: No edema.      Left lower leg: No edema.   Skin:     General: Skin is warm and dry.   Neurological:      Mental Status: He is alert and oriented to person, place, and time.      Coordination: Coordination normal.   Psychiatric:         Behavior: Behavior normal.         Thought Content: Thought content normal.

## 2023-02-13 PROBLEM — J18.9 LEFT LOWER LOBE PNEUMONIA: Status: RESOLVED | Noted: 2022-11-02 | Resolved: 2023-02-13

## 2023-02-23 ENCOUNTER — TELEPHONE (OUTPATIENT)
Dept: FAMILY MEDICINE | Facility: CLINIC | Age: 68
End: 2023-02-23
Payer: MEDICARE

## 2023-02-23 ENCOUNTER — PATIENT OUTREACH (OUTPATIENT)
Dept: ADMINISTRATIVE | Facility: HOSPITAL | Age: 68
End: 2023-02-23
Payer: MEDICARE

## 2023-02-23 NOTE — TELEPHONE ENCOUNTER
----- Message from Alcides Moore sent at 2/23/2023 10:09 AM CST -----   Name of Who is Calling:     What is the request in detail: patient request call back in reference to verify refill date for medication  HYDROcodone-acetaminophen (NORCO)  mg per tablet Please contact to further discuss and advise      Can the clinic reply by [facility]:     What Number to Call Back if not in [facility]:  [phone]

## 2023-04-04 ENCOUNTER — PATIENT OUTREACH (OUTPATIENT)
Dept: ADMINISTRATIVE | Facility: HOSPITAL | Age: 68
End: 2023-04-04
Payer: MEDICARE

## 2023-04-04 DIAGNOSIS — E11.9 TYPE 2 DIABETES MELLITUS WITHOUT COMPLICATION, WITHOUT LONG-TERM CURRENT USE OF INSULIN: Primary | ICD-10-CM

## 2023-04-21 ENCOUNTER — TELEPHONE (OUTPATIENT)
Dept: FAMILY MEDICINE | Facility: CLINIC | Age: 68
End: 2023-04-21
Payer: MEDICARE

## 2023-04-21 NOTE — TELEPHONE ENCOUNTER
----- Message from Winnie Wooten sent at 4/21/2023 11:39 AM CDT -----  Regarding: Refill request  .Type: RX Refill Request    Who Called:self    Have you contacted your pharmacy:no     Refill or New Rx: Refill    RX Name and Strength:HYDROcodone-acetaminophen (NORCO)  mg per tablet    Preferred Pharmacy with phone number:.  [facility] [place] - [address]  [address] [place] [address]  Phone: [phone] Fax: [phone]    Local or Mail Order:local     Ordering Provider:MIRNA March    Would the patient rather a call back or a response via [facility]? Call     Best Call Back Number:.[phone]      Additional Information:

## 2023-05-18 RX ORDER — HYDROCODONE BITARTRATE AND ACETAMINOPHEN 10; 325 MG/1; MG/1
1 TABLET ORAL
Qty: 30 TABLET | Refills: 0 | Status: CANCELLED | OUTPATIENT
Start: 2023-05-25

## 2023-05-18 RX ORDER — HYDROCODONE BITARTRATE AND ACETAMINOPHEN 10; 325 MG/1; MG/1
1 TABLET ORAL
Qty: 30 TABLET | Refills: 0 | Status: CANCELLED | OUTPATIENT
Start: 2023-06-25

## 2023-05-18 RX ORDER — SPIRONOLACTONE 25 MG/1
12.5 TABLET ORAL DAILY
COMMUNITY
Start: 2023-02-08

## 2023-05-19 ENCOUNTER — OFFICE VISIT (OUTPATIENT)
Dept: FAMILY MEDICINE | Facility: CLINIC | Age: 68
End: 2023-05-19
Payer: MEDICARE

## 2023-05-19 VITALS
TEMPERATURE: 98 F | OXYGEN SATURATION: 98 % | DIASTOLIC BLOOD PRESSURE: 78 MMHG | SYSTOLIC BLOOD PRESSURE: 138 MMHG | WEIGHT: 177.13 LBS | HEIGHT: 74 IN | RESPIRATION RATE: 18 BRPM | BODY MASS INDEX: 22.73 KG/M2 | HEART RATE: 68 BPM

## 2023-05-19 DIAGNOSIS — I42.9 CARDIOMYOPATHY, UNSPECIFIED TYPE: ICD-10-CM

## 2023-05-19 DIAGNOSIS — E11.29 CONTROLLED TYPE 2 DIABETES MELLITUS WITH MICROALBUMINURIA, WITHOUT LONG-TERM CURRENT USE OF INSULIN: ICD-10-CM

## 2023-05-19 DIAGNOSIS — R80.9 CONTROLLED TYPE 2 DIABETES MELLITUS WITH MICROALBUMINURIA, WITHOUT LONG-TERM CURRENT USE OF INSULIN: ICD-10-CM

## 2023-05-19 DIAGNOSIS — G89.29 CHRONIC MIDLINE LOW BACK PAIN WITHOUT SCIATICA: ICD-10-CM

## 2023-05-19 DIAGNOSIS — M54.50 CHRONIC MIDLINE LOW BACK PAIN WITHOUT SCIATICA: ICD-10-CM

## 2023-05-19 DIAGNOSIS — F11.90 CHRONIC NARCOTIC USE: Primary | ICD-10-CM

## 2023-05-19 DIAGNOSIS — I48.91 NEW ONSET ATRIAL FIBRILLATION: ICD-10-CM

## 2023-05-19 PROCEDURE — 99214 OFFICE O/P EST MOD 30 MIN: CPT | Mod: S$PBB,,, | Performed by: INTERNAL MEDICINE

## 2023-05-19 PROCEDURE — 99214 PR OFFICE/OUTPT VISIT, EST, LEVL IV, 30-39 MIN: ICD-10-PCS | Mod: S$PBB,,, | Performed by: INTERNAL MEDICINE

## 2023-05-19 PROCEDURE — 99215 OFFICE O/P EST HI 40 MIN: CPT | Mod: PBBFAC,PO | Performed by: INTERNAL MEDICINE

## 2023-05-19 PROCEDURE — 99999 PR PBB SHADOW E&M-EST. PATIENT-LVL V: ICD-10-PCS | Mod: PBBFAC,,, | Performed by: INTERNAL MEDICINE

## 2023-05-19 PROCEDURE — 99999 PR PBB SHADOW E&M-EST. PATIENT-LVL V: CPT | Mod: PBBFAC,,, | Performed by: INTERNAL MEDICINE

## 2023-05-19 RX ORDER — HYDROCODONE BITARTRATE AND ACETAMINOPHEN 10; 325 MG/1; MG/1
1 TABLET ORAL
Qty: 30 TABLET | Refills: 0 | Status: SHIPPED | OUTPATIENT
Start: 2023-06-19 | End: 2023-08-24 | Stop reason: SDUPTHER

## 2023-05-19 RX ORDER — HYDROCODONE BITARTRATE AND ACETAMINOPHEN 10; 325 MG/1; MG/1
1 TABLET ORAL
Qty: 30 TABLET | Refills: 0 | Status: SHIPPED | OUTPATIENT
Start: 2023-07-19 | End: 2023-08-21 | Stop reason: SDUPTHER

## 2023-05-19 RX ORDER — HYDROCODONE BITARTRATE AND ACETAMINOPHEN 10; 325 MG/1; MG/1
1 TABLET ORAL
Qty: 30 TABLET | Refills: 0 | Status: SHIPPED | OUTPATIENT
Start: 2023-05-19 | End: 2023-08-24 | Stop reason: SDUPTHER

## 2023-05-19 NOTE — PROGRESS NOTES
This note was created by combination of typed  and M-Modal dictation.  Transcription errors may be present.  If there are any questions, please contact me.    Assessment and Plan:   Assessment and Plan   Chronic narcotic use  Chronic midline low back pain without sciatica; by report, 9/8/2011 MRI lumbar spine schmorl's nodes and ligamentous hypertrophy, root impingment noted  - queried, no aberrancy  Taking prescription narcotic as prescribed  Intensive monitoring of high risk medication.  Discussed that if he were to develop tolerance in the future and pain levels go up, would explore other treatment options rather than increasing the dose.  -     HYDROcodone-acetaminophen (NORCO)  mg per tablet; Take 1 tablet by mouth every 24 hours as needed for Pain.  Dispense: 30 tablet; Refill: 0  -     HYDROcodone-acetaminophen (NORCO)  mg per tablet; Take 1 tablet by mouth every 24 hours as needed for Pain.  Dispense: 30 tablet; Refill: 0  -     HYDROcodone-acetaminophen (NORCO)  mg per tablet; Take 1 tablet by mouth every 24 hours as needed for Pain.  Dispense: 30 tablet; Refill: 0    Cardiomyopathy, unspecified type  -[facility].    New onset atrial fibrillation in the setting of left lower lobe pneumonia  -reports off of amiodarone. [facility]    Controlled type 2 diabetes mellitus with microalbuminuria, without long-term current use of insulin  -[facility] XR 1000 BID, he reports he is taking 500 BID.  [facility].    Medications Discontinued During This Encounter   Medication Reason    amiodarone (PACERONE) 200 MG Tab Therapy completed    HYDROcodone-acetaminophen (NORCO)  mg per tablet Reorder    HYDROcodone-acetaminophen (NORCO)  mg per tablet Reorder    HYDROcodone-acetaminophen (NORCO)  mg per tablet Reorder       meds sent this encounter:     Medications Ordered This Encounter   Medications     HYDROcodone-acetaminophen (NORCO)  mg per tablet     Sig: Take 1 tablet by mouth every 24 hours as needed for Pain.     Dispense:  30 tablet     Refill:  0     Quantity prescribed more than 7 day supply? Yes, quantity medically necessary     Order Specific Question:   I have reviewed the Prescription Drug Monitoring Program (PDMP) database for this patient prior to prescribing the above opioid medication     Answer:   Yes    HYDROcodone-acetaminophen (NORCO)  mg per tablet     Sig: Take 1 tablet by mouth every 24 hours as needed for Pain.     Dispense:  30 tablet     Refill:  0     Quantity prescribed more than 7 day supply? Yes, quantity medically necessary     Order Specific Question:   I have reviewed the Prescription Drug Monitoring Program (PDMP) database for this patient prior to prescribing the above opioid medication     Answer:   Yes    HYDROcodone-acetaminophen (NORCO)  mg per tablet     Sig: Take 1 tablet by mouth every 24 hours as needed for Pain.     Dispense:  30 tablet     Refill:  0     Quantity prescribed more than 7 day supply? Yes, quantity medically necessary     Order Specific Question:   I have reviewed the Prescription Drug Monitoring Program (PDMP) database for this patient prior to prescribing the above opioid medication     Answer:   Yes        Follow Up:  3 months routine.  Future Appointments   Date Time Provider Department Center   5/19/2023 10:20 AM Rogelio March MD [facility] [place]   8/25/2023 10:40 AM Rogelio March MD [facility] [place]          Subjective:   Subjective   Chief Complaint   Patient presents with    Follow-up    Diabetes    Medication Refill       HPI  Paulino is a 67 y.o. male.     Social History     Tobacco Use    Smoking status: Every Day     Packs/day: 0.25     Types: Cigarettes    Smokeless tobacco: Never   Substance Use Topics    Alcohol use: Not Currently      Social History     Occupational History    Occupation: disabled - orthopedic       Social History     Social History Narrative    Not on file       Last appointment with this clinic was 2/2/2023. Last visit with me 2/2/2023   To summarize last visit and events leading up to today:   chronic back pain, chronic narcotic dependence, stable   [facility]  COPD, smoker   Coronary artery disease  Hypertension  11/9/22 admit [facility] pna with sepsis, NIKOLAS, new AFib, new cardiomyopathy. On DOAC, BB, losartan. [facility].     Order Name Results Value Reference Range Date Interpretation   RETICULOCYTES (AUTO) RETICULOCYTES [#/VOLUME] IN BLOOD 41.00  49.00 - 166.00 02/23/2023 L   RETICULOCYTES (AUTO) RETICULOCYTES/100 ERYTHROCYTES IN BLOOD BY AUTOMATED COUNT 0.97  0.80 - 3.10 02/23/2023     FERRITIN FERRITIN [MASS/VOLUME] IN SERUM OR PLASMA 49  24 - 336 02/23/2023     B12/FOLATE PANEL COBALAMIN (VITAMIN B12) [MASS/VOLUME] IN SERUM OR PLASMA 652  180 - 914 02/23/2023     B12/FOLATE PANEL FOLATE [MASS/VOLUME] IN SERUM OR PLASMA >20  5.9 02/23/2023     IRON PANEL IRON [MASS/VOLUME] IN SERUM OR PLASMA 68  50 - 170 02/23/2023     IRON PANEL TRANSFERRIN [MASS/VOLUME] IN SERUM OR PLASMA 233.7  180.0 - 329.0 02/23/2023     IRON PANEL IRON SATURATION [MASS FRACTION] IN SERUM OR PLASMA 23    02/23/2023     DIFF COUNT (BLOOD) NEUTROPHILS/100 LEUKOCYTES IN BLOOD BY AUTOMATED COUNT 53.7  44.0 - 77.0 02/23/2023     DIFF COUNT (BLOOD) NEUTROPHILS [#/VOLUME] IN BLOOD 4.1  1.70 - 7.20 02/23/2023     DIFF COUNT (BLOOD) LYMPHOCYTES/100 LEUKOCYTES IN BLOOD BY AUTOMATED COUNT 37.3  16.0 - 46.0 02/23/2023     DIFF COUNT (BLOOD) LYMPHOCYTES [#/VOLUME] IN BLOOD BY AUTOMATED COUNT 2.8  0.90 - 3.40 02/23/2023     DIFF COUNT (BLOOD) MONOCYTES/100 LEUKOCYTES IN BLOOD BY AUTOMATED COUNT 5.3  1.0 - 10.0 02/23/2023     DIFF COUNT (BLOOD) MONOCYTES [#/VOLUME] IN BLOOD BY AUTOMATED COUNT 0.4  0.00 - 0.90 02/23/2023     DIFF COUNT (BLOOD) EOSINOPHILS/100 LEUKOCYTES IN BLOOD BY AUTOMATED COUNT 2.1  0.0 -  6.0 02/23/2023     DIFF COUNT (BLOOD) EOSINOPHILS [#/VOLUME] IN BLOOD BY AUTOMATED COUNT 0.2  0.00 - 0.40 02/23/2023     DIFF COUNT (BLOOD) BASOPHILS/100 LEUKOCYTES IN BLOOD BY AUTOMATED COUNT 1.6  0.0 - 2.0 02/23/2023     DIFF COUNT (BLOOD) BASOPHILS [#/VOLUME] IN BLOOD BY AUTOMATED COUNT 0.1  0.00 - 0.10 02/23/2023     CBC WITH PLATELET COUNT LEUKOCYTES [#/VOLUME] IN BLOOD BY AUTOMATED COUNT 7.5  4.8 - 10.8 02/23/2023     CBC WITH PLATELET COUNT ERYTHROCYTES [#/VOLUME] IN BLOOD BY AUTOMATED COUNT 4.23  4.50 - 6.10 02/23/2023 L   CBC WITH PLATELET COUNT HEMOGLOBIN [MASS/VOLUME] IN BLOOD 12.1  14.0 - 18.0 02/23/2023 L   CBC WITH PLATELET COUNT HEMATOCRIT [VOLUME FRACTION] OF BLOOD BY AUTOMATED COUNT 37.6  42.0 - 52.0 02/23/2023 L   CBC WITH PLATELET COUNT MCV [ENTITIC VOLUME] BY AUTOMATED COUNT 88.9  81.0 - 98.0 02/23/2023     CBC WITH PLATELET COUNT MCH [ENTITIC MASS] BY AUTOMATED COUNT 28.5  27.0 - 32.6 02/23/2023     CBC WITH PLATELET COUNT MCHC [MASS/VOLUME] BY AUTOMATED COUNT 32.1  32.2 - 34.8 02/23/2023 L   CBC WITH PLATELET COUNT PLATELETS [#/VOLUME] IN BLOOD BY AUTOMATED COUNT 195  140 - 420 02/23/2023     CBC WITH PLATELET COUNT PLATELET MEAN VOLUME [ENTITIC VOLUME] IN BLOOD BY AUTOMATED COUNT 8.9  7.4 - 10.8 02/23/2023     CBC WITH PLATELET COUNT ERYTHROCYTE DISTRIBUTION WIDTH [RATIO] BY AUTOMATED COUNT 17.9  11.8 - 14.9 02/23/2023 H   CREATININE EGFR PANEL CREATININE [MASS/VOLUME] IN SERUM OR PLASMA 1.3  0.6 - 1.3 02/23/2023     CREATININE EGFR PANEL GLOMERULAR FILTRATION RATE/1.73 SQ M.PREDICTED [VOLUME RATE/AREA] IN SERUM OR PLASMA BY CREATININE-BASED FORMULA (MDRD) 60    02/23/2023     RENAL FUNCTION PANEL CREATININE [MASS/VOLUME] IN SERUM OR PLASMA 1.3  0.6 - 1.3 02/23/2023     RENAL FUNCTION PANEL UREA NITROGEN [MASS/VOLUME] IN SERUM OR PLASMA 19  7 - 20 02/23/2023     RENAL FUNCTION PANEL GLUCOSE [MASS/VOLUME] IN SERUM OR PLASMA 170  70 - 110 02/23/2023 H   RENAL FUNCTION PANEL SODIUM [MOLES/VOLUME]  IN SERUM OR PLASMA 133  136 - 144 02/23/2023 L   RENAL FUNCTION PANEL POTASSIUM [MOLES/VOLUME] IN SERUM OR PLASMA 4.1  3.6 - 5.1 02/23/2023     RENAL FUNCTION PANEL CHLORIDE [MOLES/VOLUME] IN SERUM OR PLASMA 101  101 - 111 02/23/2023     RENAL FUNCTION PANEL CARBON DIOXIDE, TOTAL [MOLES/VOLUME] IN SERUM OR PLASMA 25  22 - 32 02/23/2023     RENAL FUNCTION PANEL CALCIUM [MASS/VOLUME] IN SERUM OR PLASMA 8.9  8.9 - 10.3 02/23/2023     RENAL FUNCTION PANEL PHOSPHATE [MASS/VOLUME] IN SERUM OR PLASMA 3.2  2.4 - 4.7 02/23/2023     RENAL FUNCTION PANEL ALBUMIN [MASS/VOLUME] IN SERUM OR PLASMA 4.0  3.5 - 5.0 02/23/2023     RENAL FUNCTION PANEL GLOMERULAR FILTRATION RATE/1.73 SQ M.PREDICTED [VOLUME RATE/AREA] IN SERUM OR PLASMA BY CREATININE-BASED FORMULA (MDRD) 60    02/23/2023       Eliquis  Coreg 12.5   Taking amiodarone?  Losartan 100   Metformin extended release 1000 b.i.d.  Prilosec 40  Crestor 20  Spironolactone 12.5     4/25, 3/25    Today's visit:  Please note: Chronic medical problems that are stable but are being addressed at this visit may not be listed/documented here, but may be addressed in more detail in the Assessment and Plan section.   Below are salient features of chronic medical conditions, or new/acute conditions and their details.    [facility]  Self directed PT  No issues with the meds  Finds it effective  Denies SE no insomnia no depression no constipation  Functional on current regimen    [facility] - [facility] and most recent labs. Not able to access OV notes.  Off of amio  Taking metformin 500 bid.  [facility] as 1000 BID.     Patient Care Team:  Rogelio March MD as PCP - General (Internal Medicine)  Fermin Lee Jr., MD as Consulting Physician (Family Medicine)  [facility]  Priyanka Guerra MD (Internal Medicine)  Fito Bowles Sr., MD as Consulting Physician (Ophthalmology)  Rock Quiajno MD (Cardiology)  [facility]  Administration  Melissa Dominguez MA as Care Coordinator    Patient Active Problem List    Diagnosis Date Noted    Esophageal varices without bleeding, unspecified esophageal varices type 02/01/2023    Aortic atherosclerosis 02/01/2023 4/13/20 CT abd/pelvis There are calcifications in the abdominal aorta and its branch vessels        Combined forms of age-related cataract, left eye 02/01/2023    Erectile dysfunction 02/01/2023    Cardiomyopathy 11/04/2022 11/02/2022 TTE LV mildly enlarged with eccentric hypertrophy moderately decreased systolic function.  LVEF 35%.  Grade 2 diastolic dysfunction.  Segmental left ventricular wall motion abnormalities.  Normal RV size and RV systolic function.        New onset atrial fibrillation in the setting of left lower lobe pneumonia 11/02/2022    VT (ventricular tachycardia) 11/02/2022    Tobacco abuse counseling 10/30/2022    Common bile duct dilatation on CT and MRI, tumor markers negative, no further imaging 07/29/2021    Ventral hernia without obstruction or gangrene 06/08/2020    Anemia of chronic disease on labs 9/2019 09/11/2019    GERD (gastroesophageal reflux disease) 05/14/2019    COPD (chronic obstructive pulmonary disease) with reported hx of abn PFTs 05/14/2019 11/9/22 CT chest: Centrilobular emphysema.        Primary osteoarthritis of both knees hx of injection 05/14/2019    History of hepatitis C s/p SVR 2014 [facility].  [facility] with [facility], reported MRI liver 8/2018 08/29/2018    Idiopathic gout of multiple sites 11/24/2017    Chronic midline low back pain without sciatica; by report, 9/8/2011 MRI lumbar spine schmorl's nodes and ligamentous hypertrophy, root impingment noted 10/24/2017    Chronic narcotic use 10/24/2017    Essential hypertension 10/24/2017    Controlled type 2 diabetes mellitus with microalbuminuria, without long-term current use of insulin 10/24/2017    Coronary artery disease involving native coronary artery of  native heart without angina pectoris 10/24/2017    Benign prostatic hyperplasia without lower urinary tract symptoms 10/24/2017    Smoker 10/24/2017       PAST MEDICAL PROBLEMS, PAST SURGICAL HISTORY: please see relevant portions of the electronic medical record    ALLERGIES AND MEDICATIONS: updated and reviewed.  Medication List with Changes/Refills   Current Medications    ALBUTEROL (PROVENTIL/VENTOLIN HFA) 90 MCG/ACTUATION INHALER    INHALE 2 PUFFS BY MOUTH EVERY FOUR HOURS AS NEEDED AS A RESCUE INHALER    ALLOPURINOL (ZYLOPRIM) 100 MG TABLET    Take 1 tablet (100 mg total) by mouth once daily. Decreased dose    AMIODARONE (PACERONE) 200 MG TAB    Take 200 mg by mouth once daily.    ASPIRIN (ECOTRIN) 81 MG EC TABLET    Take 81 mg by mouth once daily.    AZELASTINE (ASTELIN) 137 MCG (0.1 %) NASAL SPRAY    1 spray (137 mcg total) by Nasal route 2 (two) times daily.    CARVEDILOL (COREG) 12.5 MG TABLET    Take 12.5 mg by mouth 2 (two) times daily with meals.    CETIRIZINE (ZYRTEC) 10 MG TABLET    cetirizine 10 mg tablet   Take 1 tablet every day by oral route as needed for 30 days.    HYDROCODONE-ACETAMINOPHEN (NORCO)  MG PER TABLET    Take 1 tablet by mouth every 24 hours as needed for Pain.    HYDROCODONE-ACETAMINOPHEN (NORCO)  MG PER TABLET    Take 1 tablet by mouth every 24 hours as needed for Pain.    HYDROCODONE-ACETAMINOPHEN (NORCO)  MG PER TABLET    Take 1 tablet by mouth every 24 hours as needed for Pain.    LIDOCAINE (LIDODERM) 5 %    APPLY 3 PATCHES TOPICALLY EVERY DAY FOR PAIN. WEAR FOR 12 HOURS, THEN REMOVE. DO NOT APPLY NEW PATCH FOR AT LEAST 12 HOURS.    LOSARTAN (COZAAR) 100 MG TABLET    Take 100 mg by mouth once daily.    METFORMIN (FORTAMET) 500 MG 24HR TABLET    Take 500 mg by mouth 2 (two) times daily with meals.    METHYL SALICYLATE-MENTHOL 15-10% 15-10 % CREA    APPLY MODERATE AMOUNT TOPICALLY ONCE DAILY AS NEEDED    NARCAN 4 MG/ACTUATION SPRY        OMEPRAZOLE (PRILOSEC) 40  "MG CAPSULE    Take 40 mg by mouth once daily.    ROSUVASTATIN (CRESTOR) 40 MG TAB    Take 20 mg by mouth every evening.    SILDENAFIL (VIAGRA) 50 MG TABLET    TAKE ONE-HALF TABLET BY MOUTH EVERY SEVEN DAYS AS NEEDED 30 TO 60 MINUTES BEFORE INTERCOURSE FOR BEST RESULTS TAKE ON EMPTY STOMACH    TIOTROPIUM BROMIDE (SPIRIVA RESPIMAT) 1.25 MCG/ACTUATION INHALER    INHALE 2 PUFFS BY MOUTH EVERY DAY         Objective:   Objective   Physical Exam   Vitals:    05/19/23 0940   BP: 138/78   Pulse: 68   Resp: 18   Temp: 97.8 °F (36.6 °C)   TempSrc: Oral   SpO2: 98%   Weight: 80.3 kg (177 lb 2.2 oz)   Height: 6' 2" (1.88 m)    Body mass index is 22.74 kg/m².            Physical Exam  Constitutional:       General: He is not in acute distress.     Appearance: He is well-developed.   Eyes:      Extraocular Movements: Extraocular movements intact.   Cardiovascular:      Rate and Rhythm: Normal rate and regular rhythm.      Heart sounds: Normal heart sounds. No murmur heard.  Pulmonary:      Effort: Pulmonary effort is normal.      Breath sounds: Normal breath sounds.   Musculoskeletal:         General: Normal range of motion.      Right lower leg: No edema.      Left lower leg: No edema.   Skin:     General: Skin is warm and dry.   Neurological:      Mental Status: He is alert and oriented to person, place, and time.      Coordination: Coordination normal.   Psychiatric:         Behavior: Behavior normal.         Thought Content: Thought content normal.              "

## 2023-06-03 DIAGNOSIS — Z71.89 COMPLEX CARE COORDINATION: ICD-10-CM

## 2023-06-13 ENCOUNTER — PES CALL (OUTPATIENT)
Dept: ADMINISTRATIVE | Facility: CLINIC | Age: 68
End: 2023-06-13
Payer: MEDICARE

## 2023-07-18 ENCOUNTER — PATIENT OUTREACH (OUTPATIENT)
Dept: ADMINISTRATIVE | Facility: HOSPITAL | Age: 68
End: 2023-07-18
Payer: MEDICARE

## 2023-07-18 NOTE — PROGRESS NOTES
Health Maintenance and immunizations reviewed/ updated.   CALLED PT PERTAINING TO [facility] REFERRAL. (PT DECLINED)

## 2023-08-04 ENCOUNTER — PATIENT OUTREACH (OUTPATIENT)
Dept: FAMILY MEDICINE | Facility: CLINIC | Age: 68
End: 2023-08-04
Payer: MEDICARE

## 2023-08-04 DIAGNOSIS — E11.29 CONTROLLED TYPE 2 DIABETES MELLITUS WITH MICROALBUMINURIA, WITHOUT LONG-TERM CURRENT USE OF INSULIN: ICD-10-CM

## 2023-08-04 DIAGNOSIS — R80.9 CONTROLLED TYPE 2 DIABETES MELLITUS WITH MICROALBUMINURIA, WITHOUT LONG-TERM CURRENT USE OF INSULIN: ICD-10-CM

## 2023-08-04 DIAGNOSIS — J41.0 SIMPLE CHRONIC BRONCHITIS: ICD-10-CM

## 2023-08-04 DIAGNOSIS — F11.90 CHRONIC NARCOTIC USE: Primary | ICD-10-CM

## 2023-08-04 PROCEDURE — 99999 PR PBB SHADOW E&M-EST. PATIENT-LVL I: CPT | Mod: PBBFAC,,, | Performed by: INTERNAL MEDICINE

## 2023-08-04 PROCEDURE — 99999 PR PBB SHADOW E&M-EST. PATIENT-LVL I: ICD-10-PCS | Mod: PBBFAC,,, | Performed by: INTERNAL MEDICINE

## 2023-08-04 NOTE — LETTER
AUTHORIZATION FOR RELEASE OF   CONFIDENTIAL INFORMATION    Dear [facility],    We are seeing Paulino Meadows, date of birth 1955, in the clinic at [facility] FAMILY MED/ INTERNAL MED/ PEDS. Rogelio March MD is the patient's PCP. Paulino Meadows has an outstanding lab/procedure at the time we reviewed his chart. In order to help keep his health information updated, he has authorized us to request the following medical record(s):                                          (X)  OUTSIDE LAB RESULTS                                       (X)  DM EYE EXAM               Please fax records to [facility],  [phone]       If you have any questions, please contact , Melissa Dominguez at [phone].           Patient Name: Paulino Meadows  : 1955  Patient Phone #: [phone]

## 2023-08-21 DIAGNOSIS — M54.50 CHRONIC MIDLINE LOW BACK PAIN WITHOUT SCIATICA: ICD-10-CM

## 2023-08-21 DIAGNOSIS — F11.90 CHRONIC NARCOTIC USE: ICD-10-CM

## 2023-08-21 DIAGNOSIS — G89.29 CHRONIC MIDLINE LOW BACK PAIN WITHOUT SCIATICA: ICD-10-CM

## 2023-08-21 RX ORDER — HYDROCODONE BITARTRATE AND ACETAMINOPHEN 10; 325 MG/1; MG/1
1 TABLET ORAL
Qty: 30 TABLET | Refills: 0 | Status: SHIPPED | OUTPATIENT
Start: 2023-08-21 | End: 2023-08-24 | Stop reason: SDUPTHER

## 2023-08-21 NOTE — TELEPHONE ENCOUNTER
----- Message from Ashley Adkins sent at 8/21/2023 12:52 PM CDT -----  Regarding: Self [phone]   Type: RX Refill Request    Who Called: Self     Have you contacted your pharmacy: no     Refill    RX Name and Strength:HYDROcodone-acetaminophen (NORCO)  mg per tablet    Preferred Pharmacy with phone number: .  [facility] [place] - [address]  [address] [place] [address]  Phone: [phone] Fax: [phone]        Local or Mail Order: local     Would the patient rather a call back or a response via [facility]? Call back     Best Call Back Number: .[phone]      Additional Information:  Pt stated he only has 1 pill left and needs medication for the days until his appt on 8/25.     Thank you.

## 2023-08-21 NOTE — TELEPHONE ENCOUNTER
No care due was identified.  [facility] Embedded Care Due Messages. Reference number: [id number].   8/21/2023 12:59:08 PM CDT

## 2023-08-22 ENCOUNTER — TELEPHONE (OUTPATIENT)
Dept: FAMILY MEDICINE | Facility: CLINIC | Age: 68
End: 2023-08-22
Payer: MEDICARE

## 2023-08-22 NOTE — TELEPHONE ENCOUNTER
----- Message from Ines Rodriguez sent at 8/22/2023 11:16 AM CDT -----  Regarding: self [phone]  Type: RX Refill Request       Who Called: self        Have you contacted your pharmacy: no        Refill or New Rx: refill       RX Name and Strength: HYDROcodone-acetaminophen (NORCO)  mg per tablet      Preferred Pharmacy with phone number:  [facility] [place] - [address]  [address] [place] [address]  Phone: [phone] Fax: [phone]      Local or Mail Order:       More info: pending auth, pt stated he would like a call back in regards to med         Would the patient rather a call back or a response via [facility]? Calk back [phone]

## 2023-08-24 NOTE — PROGRESS NOTES
This note was created by combination of typed  and M-Modal dictation.  Transcription errors may be present.  If there are any questions, please contact me.    Assessment and Plan:   Assessment and Plan   Chronic narcotic use  Chronic midline low back pain without sciatica; by report, 9/8/2011 MRI lumbar spine schmorl's nodes and ligamentous hypertrophy, root impingment noted  - queried, no aberrancy  Taking prescription narcotic as prescribed  Intensive monitoring of high risk medication.  -     HYDROcodone-acetaminophen (NORCO)  mg per tablet; Take 1 tablet by mouth every 24 hours as needed for Pain.  Dispense: 30 tablet; Refill: 0  -     HYDROcodone-acetaminophen (NORCO)  mg per tablet; Take 1 tablet by mouth every 24 hours as needed for Pain.  Dispense: 30 tablet; Refill: 0  -     HYDROcodone-acetaminophen (NORCO)  mg per tablet; Take 1 tablet by mouth every 24 hours as needed for Pain.  Dispense: 30 tablet; Refill: 0    Hypertension, hyperlipidemia, atrial fibrillation, COPD, [facility]      Medications Discontinued During This Encounter   Medication Reason    HYDROcodone-acetaminophen (NORCO)  mg per tablet Reorder    HYDROcodone-acetaminophen (NORCO)  mg per tablet Reorder    HYDROcodone-acetaminophen (NORCO)  mg per tablet Reorder       meds sent this encounter:  Medications Ordered This Encounter   Medications    HYDROcodone-acetaminophen (NORCO)  mg per tablet     Sig: Take 1 tablet by mouth every 24 hours as needed for Pain.     Dispense:  30 tablet     Refill:  0     Quantity prescribed more than 7 day supply? Yes, quantity medically necessary     Order Specific Question:   I have reviewed the Prescription Drug Monitoring Program (PDMP) database for this patient prior to prescribing the above opioid medication     Answer:   Yes    HYDROcodone-acetaminophen (NORCO)  mg per tablet     Sig: Take 1 tablet by mouth every 24 hours  as needed for Pain.     Dispense:  30 tablet     Refill:  0     Quantity prescribed more than 7 day supply? Yes, quantity medically necessary     Order Specific Question:   I have reviewed the Prescription Drug Monitoring Program (PDMP) database for this patient prior to prescribing the above opioid medication     Answer:   Yes    HYDROcodone-acetaminophen (NORCO)  mg per tablet     Sig: Take 1 tablet by mouth every 24 hours as needed for Pain.     Dispense:  30 tablet     Refill:  0     Quantity prescribed more than 7 day supply? Yes, quantity medically necessary     Order Specific Question:   I have reviewed the Prescription Drug Monitoring Program (PDMP) database for this patient prior to prescribing the above opioid medication     Answer:   Yes         Follow Up:  Follow-up 3 months routine  Future Appointments   Date Time Provider Department Center   11/28/2023 11:00 AM Rogelio March MD [facility]          Subjective:   Subjective   Chief Complaint   Patient presents with    Chronic Pain     Follow up        HPI  Paulino is a 67 y.o. male.     Social History     Tobacco Use    Smoking status: Every Day     Current packs/day: 0.25     Types: Cigarettes     Passive exposure: Current    Smokeless tobacco: Never    Tobacco comments:     Not ready to stop smoking vs    Substance Use Topics    Alcohol use: Not Currently      Social History     Occupational History    Occupation: disabled - orthopedic      Social History     Social History Narrative    Not on file       Last appointment with this clinic was 5/19/2023. Last visit with me 5/19/2023   To summarize last visit and events leading up to today:   chronic back pain, chronic narcotic dependence, stable   [facility]  COPD, smoker   Coronary artery disease  Hypertension  11/9/22 admit [facility] pna with sepsis, NIKOLAS, new AFib, new cardiomyopathy. On DOAC, BB, losartan. Reportedly [facility]. [facility] spironolactone.   And stopped his amiodarone     8/21, 7/22    Today's visit:  Please note: Chronic medical problems that are stable but are being addressed at this visit may not be listed/documented here, but may be addressed in more detail in the Assessment and Plan section.   Below are salient features of chronic medical conditions, or new/acute conditions and their details.    Still going to [facility] center  Restarted exercises last week  Treadmill, stationary cycle, some weight resistance  Had been feeling down  - daughter lost her child    [facility] and [facility] with [facility]    Denies issues with the narcotics.  Taking as prescribed.  Finds it helps keep him functional.  Denies obvious side effects.  Denies insomnia, nausea, constipation, depression      Patient Care Team:  Rogelio March MD as PCP - General (Internal Medicine)  Fermin Lee Jr., MD as Consulting Physician (Family Medicine)  [facility]  Priyanka Guerra MD (Internal Medicine)  Fito Bowles Sr., MD as Consulting Physician (Ophthalmology)  Rock Quijano MD (Cardiology)  Administration, [facility]  Melissa Dominguez MA as Care Coordinator    Patient Active Problem List    Diagnosis Date Noted    Esophageal varices without bleeding, unspecified esophageal varices type 02/01/2023    Aortic atherosclerosis 02/01/2023 4/13/20 CT abd/pelvis There are calcifications in the abdominal aorta and its branch vessels      Combined forms of age-related cataract, left eye 02/01/2023    Erectile dysfunction 02/01/2023    Cardiomyopathy 11/04/2022 11/02/2022 TTE LV mildly enlarged with eccentric hypertrophy moderately decreased systolic function.  LVEF 35%.  Grade 2 diastolic dysfunction.  Segmental left ventricular wall motion abnormalities.  Normal RV size and RV systolic function.      New onset atrial fibrillation in the setting of left lower lobe pneumonia 11/02/2022    VT (ventricular tachycardia) 11/02/2022    Tobacco abuse counseling  10/30/2022    Common bile duct dilatation on CT and MRI, tumor markers negative, no further imaging 07/29/2021    Ventral hernia without obstruction or gangrene 06/08/2020    Anemia of chronic disease on labs 9/2019 09/11/2019    GERD (gastroesophageal reflux disease) 05/14/2019    COPD (chronic obstructive pulmonary disease) with reported hx of abn PFTs 05/14/2019 11/9/22 CT chest: Centrilobular emphysema.      Primary osteoarthritis of both knees hx of injection 05/14/2019    History of hepatitis C s/p SVR 2014 [facility].  [facility] with [facility], reported MRI liver 8/2018 08/29/2018    Idiopathic gout of multiple sites 11/24/2017    Chronic midline low back pain without sciatica; by report, 9/8/2011 MRI lumbar spine schmorl's nodes and ligamentous hypertrophy, root impingment noted 10/24/2017    Chronic narcotic use 10/24/2017    Essential hypertension 10/24/2017    Controlled type 2 diabetes mellitus with microalbuminuria, without long-term current use of insulin 10/24/2017    Coronary artery disease involving native coronary artery of native heart without angina pectoris 10/24/2017    Benign prostatic hyperplasia without lower urinary tract symptoms 10/24/2017    Smoker 10/24/2017       PAST MEDICAL PROBLEMS, PAST SURGICAL HISTORY: please see relevant portions of the electronic medical record    ALLERGIES AND MEDICATIONS: updated and reviewed.  Medication List with Changes/Refills   Current Medications    ALBUTEROL (PROVENTIL/VENTOLIN HFA) 90 MCG/ACTUATION INHALER    INHALE 2 PUFFS BY MOUTH EVERY FOUR HOURS AS NEEDED AS A RESCUE INHALER    ALLOPURINOL (ZYLOPRIM) 100 MG TABLET    Take 1 tablet (100 mg total) by mouth once daily. Decreased dose    ASPIRIN (ECOTRIN) 81 MG EC TABLET    Take 81 mg by mouth once daily.    AZELASTINE (ASTELIN) 137 MCG (0.1 %) NASAL SPRAY    1 spray (137 mcg total) by Nasal route 2 (two) times daily.    CARVEDILOL (COREG) 12.5 MG TABLET    Take 12.5 mg by mouth 2  "(two) times daily with meals.    CETIRIZINE (ZYRTEC) 10 MG TABLET    cetirizine 10 mg tablet   Take 1 tablet every day by oral route as needed for 30 days.    HYDROCODONE-ACETAMINOPHEN (NORCO)  MG PER TABLET    Take 1 tablet by mouth every 24 hours as needed for Pain.    HYDROCODONE-ACETAMINOPHEN (NORCO)  MG PER TABLET    Take 1 tablet by mouth every 24 hours as needed for Pain.    HYDROCODONE-ACETAMINOPHEN (NORCO)  MG PER TABLET    Take 1 tablet by mouth every 24 hours as needed for Pain.    LIDOCAINE (LIDODERM) 5 %    APPLY 3 PATCHES TOPICALLY EVERY DAY FOR PAIN. WEAR FOR 12 HOURS, THEN REMOVE. DO NOT APPLY NEW PATCH FOR AT LEAST 12 HOURS.    LOSARTAN (COZAAR) 100 MG TABLET    Take 100 mg by mouth once daily.    METFORMIN (FORTAMET) 500 MG 24HR TABLET    Take 500 mg by mouth 2 (two) times daily with meals.    METHYL SALICYLATE-MENTHOL 15-10% 15-10 % CREA    APPLY MODERATE AMOUNT TOPICALLY ONCE DAILY AS NEEDED    NARCAN 4 MG/ACTUATION SPRY        OMEPRAZOLE (PRILOSEC) 40 MG CAPSULE    Take 40 mg by mouth once daily.    ROSUVASTATIN (CRESTOR) 40 MG TAB    Take 20 mg by mouth every evening.    SILDENAFIL (VIAGRA) 50 MG TABLET    TAKE ONE-HALF TABLET BY MOUTH EVERY SEVEN DAYS AS NEEDED 30 TO 60 MINUTES BEFORE INTERCOURSE FOR BEST RESULTS TAKE ON EMPTY STOMACH    SPIRONOLACTONE (ALDACTONE) 25 MG TABLET    Take 12.5 mg by mouth once daily.    TIOTROPIUM BROMIDE (SPIRIVA RESPIMAT) 1.25 MCG/ACTUATION INHALER    INHALE 2 PUFFS BY MOUTH EVERY DAY         Objective:   Objective   Physical Exam   Vitals:    08/25/23 0944   BP: 112/60   Pulse: 82   Temp: 98 °F (36.7 °C)   TempSrc: Oral   SpO2: 95%   Weight: 76 kg (167 lb 7 oz)   Height: 6' 2" (1.88 m)    Body mass index is 21.5 kg/m².  Weight: 76 kg (167 lb 7 oz)   Height: 6' 2" (188 cm)     Physical Exam  Constitutional:       General: He is not in acute distress.     Appearance: He is well-developed.   Eyes:      Extraocular Movements: Extraocular movements " intact.   Cardiovascular:      Rate and Rhythm: Normal rate and regular rhythm.      Heart sounds: Normal heart sounds. No murmur heard.  Pulmonary:      Effort: Pulmonary effort is normal.      Breath sounds: Normal breath sounds.   Musculoskeletal:         General: Normal range of motion.      Right lower leg: No edema.      Left lower leg: No edema.   Skin:     General: Skin is warm and dry.   Neurological:      Mental Status: He is alert and oriented to person, place, and time.      Coordination: Coordination normal.   Psychiatric:         Behavior: Behavior normal.         Thought Content: Thought content normal.

## 2023-08-25 ENCOUNTER — OFFICE VISIT (OUTPATIENT)
Dept: FAMILY MEDICINE | Facility: CLINIC | Age: 68
End: 2023-08-25
Payer: MEDICARE

## 2023-08-25 VITALS
HEART RATE: 82 BPM | HEIGHT: 74 IN | WEIGHT: 167.44 LBS | OXYGEN SATURATION: 95 % | DIASTOLIC BLOOD PRESSURE: 60 MMHG | BODY MASS INDEX: 21.49 KG/M2 | SYSTOLIC BLOOD PRESSURE: 112 MMHG | TEMPERATURE: 98 F

## 2023-08-25 DIAGNOSIS — G89.29 CHRONIC MIDLINE LOW BACK PAIN WITHOUT SCIATICA: ICD-10-CM

## 2023-08-25 DIAGNOSIS — M54.50 CHRONIC MIDLINE LOW BACK PAIN WITHOUT SCIATICA: ICD-10-CM

## 2023-08-25 DIAGNOSIS — F11.90 CHRONIC NARCOTIC USE: ICD-10-CM

## 2023-08-25 PROCEDURE — 99214 OFFICE O/P EST MOD 30 MIN: CPT | Mod: S$PBB,,, | Performed by: INTERNAL MEDICINE

## 2023-08-25 PROCEDURE — 99999 PR PBB SHADOW E&M-EST. PATIENT-LVL V: ICD-10-PCS | Mod: PBBFAC,,, | Performed by: INTERNAL MEDICINE

## 2023-08-25 PROCEDURE — 99215 OFFICE O/P EST HI 40 MIN: CPT | Mod: PBBFAC,PO | Performed by: INTERNAL MEDICINE

## 2023-08-25 PROCEDURE — 99999 PR PBB SHADOW E&M-EST. PATIENT-LVL V: CPT | Mod: PBBFAC,,, | Performed by: INTERNAL MEDICINE

## 2023-08-25 PROCEDURE — 99214 PR OFFICE/OUTPT VISIT, EST, LEVL IV, 30-39 MIN: ICD-10-PCS | Mod: S$PBB,,, | Performed by: INTERNAL MEDICINE

## 2023-08-25 RX ORDER — HYDROCODONE BITARTRATE AND ACETAMINOPHEN 10; 325 MG/1; MG/1
1 TABLET ORAL
Qty: 30 TABLET | Refills: 0 | Status: SHIPPED | OUTPATIENT
Start: 2023-10-20 | End: 2023-12-22 | Stop reason: SDUPTHER

## 2023-08-25 RX ORDER — HYDROCODONE BITARTRATE AND ACETAMINOPHEN 10; 325 MG/1; MG/1
1 TABLET ORAL
Qty: 30 TABLET | Refills: 0 | Status: SHIPPED | OUTPATIENT
Start: 2023-11-19 | End: 2023-11-14 | Stop reason: SDUPTHER

## 2023-08-25 RX ORDER — HYDROCODONE BITARTRATE AND ACETAMINOPHEN 10; 325 MG/1; MG/1
1 TABLET ORAL
Qty: 30 TABLET | Refills: 0 | Status: SHIPPED | OUTPATIENT
Start: 2023-09-20 | End: 2023-12-22 | Stop reason: SDUPTHER

## 2023-09-20 ENCOUNTER — OUTPATIENT CASE MANAGEMENT (OUTPATIENT)
Dept: ADMINISTRATIVE | Facility: OTHER | Age: 68
End: 2023-09-20
Payer: MEDICARE

## 2023-11-14 DIAGNOSIS — M54.50 CHRONIC MIDLINE LOW BACK PAIN WITHOUT SCIATICA: ICD-10-CM

## 2023-11-14 DIAGNOSIS — F11.90 CHRONIC NARCOTIC USE: ICD-10-CM

## 2023-11-14 DIAGNOSIS — G89.29 CHRONIC MIDLINE LOW BACK PAIN WITHOUT SCIATICA: ICD-10-CM

## 2023-11-14 NOTE — TELEPHONE ENCOUNTER
No care due was identified.  Health [facility] Embedded Care Due Messages. Reference number: [id number].   11/14/2023 4:01:33 PM CST

## 2023-11-14 NOTE — TELEPHONE ENCOUNTER
----- Message from Karina Hernandez sent at 11/14/2023  3:50 PM CST -----  Regarding: pt called  Can the clinic reply in [facility]: no           Please refill the medication(s) listed below. Please call the patient when the prescription(s) is ready for  at this phone number    Telephone Information:  Mobile          [phone]    PT WOULD LIKE TO USE PHARMACY [facility]OW INSTEAD OF [facility]           Medication #1 HYDROcodone-acetaminophen (NORCO)  mg per tablet                    Preferred Pharmacy:   [facility] - [address] [address]  Phone: [phone] Fax: [phone]

## 2023-11-15 ENCOUNTER — TELEPHONE (OUTPATIENT)
Dept: FAMILY MEDICINE | Facility: CLINIC | Age: 68
End: 2023-11-15
Payer: MEDICARE

## 2023-11-15 DIAGNOSIS — G89.29 CHRONIC MIDLINE LOW BACK PAIN WITHOUT SCIATICA: ICD-10-CM

## 2023-11-15 DIAGNOSIS — M54.50 CHRONIC MIDLINE LOW BACK PAIN WITHOUT SCIATICA: ICD-10-CM

## 2023-11-15 DIAGNOSIS — F11.90 CHRONIC NARCOTIC USE: ICD-10-CM

## 2023-11-15 RX ORDER — HYDROCODONE BITARTRATE AND ACETAMINOPHEN 10; 325 MG/1; MG/1
1 TABLET ORAL
Qty: 30 TABLET | Refills: 0 | Status: SHIPPED | OUTPATIENT
Start: 2023-11-19 | End: 2023-12-18 | Stop reason: SDUPTHER

## 2023-11-15 RX ORDER — HYDROCODONE BITARTRATE AND ACETAMINOPHEN 10; 325 MG/1; MG/1
1 TABLET ORAL
Qty: 30 TABLET | Refills: 0 | Status: SHIPPED | OUTPATIENT
Start: 2023-11-19 | End: 2023-11-15 | Stop reason: SDUPTHER

## 2023-11-15 NOTE — TELEPHONE ENCOUNTER
Spoke with patient to inform him that his pain medications will be sent over to the pharmacy he request, but it won't be released until probably Monday 11/20/23 if his pharmacy is closed on Sundays. Patient also requested that his medication profile be sent over to the pharmacy. Patient instructed to notify pharmacist to contact the clinic with request.

## 2023-11-15 NOTE — TELEPHONE ENCOUNTER
No care due was identified.  Health [facility] Embedded Care Due Messages. Reference number: [id number].   11/15/2023 4:17:52 PM CST

## 2023-11-15 NOTE — TELEPHONE ENCOUNTER
----- Message from Michelle Fung sent at 11/15/2023  3:32 PM CST -----  Regarding: patient call back  Type: Patient Call Back    Who called: Self     What is the request in detail: Asked for a call back to discuss his Rx for HYDROcodone-acetaminophen (NORCO)  mg per tablet. He needs it to go back the his original pharmacy     Can the clinic reply by [facility]? No     Would the patient rather a call back or a response via [facility]? Call     Best call back number: .[phone]

## 2023-11-15 NOTE — TELEPHONE ENCOUNTER
----- Message from Mary Carlito sent at 11/15/2023  3:59 PM CST -----  Regarding: [id number]  Type: RX Refill Request    Who Called: self     Have you contacted your pharmacy:yes    Refill or New Rx:New Rx    RX Name and Strength:HYDROcodone-acetaminophen (NORCO)  mg per tablet      Preferred Pharmacy with phone number:  [facility] [place] [place] - [address]  [address] [place] [address]  Phone: [phone] Fax: [phone]         Local or Mail Order:local     Ordering Provider:Dair     Would the patient rather a call back or a response via [facility]? Call back     Best Call Back Number:[phone]      Additional Information: Pt states to please send prescription to the above pharmacy because [facility] told him he has to have multiple prescriptions being filled by them not just one., that that's their policy

## 2023-11-16 DIAGNOSIS — R80.9 CONTROLLED TYPE 2 DIABETES MELLITUS WITH MICROALBUMINURIA, WITHOUT LONG-TERM CURRENT USE OF INSULIN: ICD-10-CM

## 2023-11-16 DIAGNOSIS — E11.29 CONTROLLED TYPE 2 DIABETES MELLITUS WITH MICROALBUMINURIA, WITHOUT LONG-TERM CURRENT USE OF INSULIN: ICD-10-CM

## 2023-12-18 DIAGNOSIS — M54.50 CHRONIC MIDLINE LOW BACK PAIN WITHOUT SCIATICA: ICD-10-CM

## 2023-12-18 DIAGNOSIS — G89.29 CHRONIC MIDLINE LOW BACK PAIN WITHOUT SCIATICA: ICD-10-CM

## 2023-12-18 DIAGNOSIS — F11.90 CHRONIC NARCOTIC USE: ICD-10-CM

## 2023-12-18 RX ORDER — HYDROCODONE BITARTRATE AND ACETAMINOPHEN 10; 325 MG/1; MG/1
1 TABLET ORAL
Qty: 30 TABLET | Refills: 0 | Status: SHIPPED | OUTPATIENT
Start: 2023-12-18 | End: 2023-12-22 | Stop reason: SDUPTHER

## 2023-12-18 NOTE — TELEPHONE ENCOUNTER
Patient states he need his norco refilled. Next available appointment with PCP not until May. Patient states he need an appointment sooner with PCP.  Please advise

## 2023-12-18 NOTE — TELEPHONE ENCOUNTER
----- Message from Winnie Wooten sent at 12/18/2023 10:58 AM CST -----  Regarding: Sooner appointment request  .Type:  Sooner Appointment Request    Patient is requesting a sooner appointment.  Patient declined first available appointment listed as well as another facility and provider .  Patient will not accept being placed on the waitlist and is requesting a message be sent to doctor.    Name of Caller: self     When is the first available appointment? May 2024    Symptoms: medication refill     Would the patient rather a call back or a response via [facility]? Call     Best Call Back Number: .[phone]      Additional Information: asking for medication HYDROcodone-acetaminophen (NORCO)  mg per tablet to be sent to pharmacy until he can see the provider     .  [facility] [place] [address]  [address] [place] [address]  Phone: [phone] Fax: [phone]

## 2023-12-22 NOTE — PROGRESS NOTES
This note was created by combination of typed  and M-Modal dictation.  Transcription errors may be present.  If there are any questions, please contact me.    Assessment and Plan:   Assessment and Plan   Chronic narcotic use  Opioid dependence, uncomplicated  Chronic midline low back pain without sciatica; by report, 9/8/2011 MRI lumbar spine schmorl's nodes and ligamentous hypertrophy, root impingment noted  - queried, no aberrancy  Taking prescription narcotic as prescribed  Intensive monitoring of high risk medication.  -     HYDROcodone-acetaminophen (NORCO)  mg per tablet; Take 1 tablet by mouth every 24 hours as needed for Pain.  Dispense: 30 tablet; Refill: 0  -     HYDROcodone-acetaminophen (NORCO)  mg per tablet; Take 1 tablet by mouth every 24 hours as needed for Pain.  Dispense: 30 tablet; Refill: 0  -     HYDROcodone-acetaminophen (NORCO)  mg per tablet; Take 1 tablet by mouth every 24 hours as needed for Pain.  Dispense: 30 tablet; Refill: 0  -     Toxicology Screen, Urine; Future; Expected date: 04/02/2024    Muscle tightness  -flexeril for prn use.  Cautioned re: SE profile especially sedation  -     cyclobenzaprine (FLEXERIL) 10 MG tablet; Take 1 tablet (10 mg total) by mouth every evening.  Dispense: 30 tablet; Refill: 2    Nonallergic rhinitis  -     azelastine (ASTELIN) 137 mcg (0.1 %) nasal spray; 1 spray (137 mcg total) by Nasal route 2 (two) times daily.  Dispense: 30 mL; Refill: 11    Essential hypertension  Cardiomyopathy, unspecified type  -[facility]. Off of BB. On ARB, on spironolactone  -losartan, BP good, managed by [facility]    Controlled type 2 diabetes mellitus with microalbuminuria, without long-term current use of insulin  -managed by [facility], metformin  BID, glipizide per pt    Chronic obstructive pulmonary disease, unspecified COPD type  -spiriva per [facility], lung monitoring per [facility]    Gastroesophageal reflux disease, unspecified whether esophagitis  present  -pepcid per [facility]    Atrial fibrillation, unspecified type  -in setting of acute illness no longer on BB or DOAC, managed by [facility]    Esophageal varices without bleeding, unspecified esophageal varices type  -on H2 blocker, managed by [facility]      Aortic atherosclerosis  -on statin, managed by [facility]       Medications Discontinued During This Encounter   Medication Reason    albuterol (PROVENTIL/VENTOLIN HFA) 90 mcg/actuation inhaler Patient no longer taking    losartan (COZAAR) 100 MG tablet Patient no longer taking    carvediloL (COREG) 12.5 MG tablet Therapy completed    omeprazole (PRILOSEC) 40 MG capsule Alternate therapy    amLODIPine (NORVASC) 2.5 MG tablet Therapy completed    fluticasone-salmeterol diskus inhaler 500-50 mcg Therapy completed    irbesartan (AVAPRO) 300 MG tablet Alternate therapy    valsartan (DIOVAN) 320 MG tablet Alternate therapy    azelastine (ASTELIN) 137 mcg (0.1 %) nasal spray Reorder    HYDROcodone-acetaminophen (NORCO)  mg per tablet Reorder    HYDROcodone-acetaminophen (NORCO)  mg per tablet Reorder    HYDROcodone-acetaminophen (NORCO)  mg per tablet Reorder       meds sent this encounter:  Medications Ordered This Encounter   Medications    azelastine (ASTELIN) 137 mcg (0.1 %) nasal spray     Si spray (137 mcg total) by Nasal route 2 (two) times daily.     Dispense:  30 mL     Refill:  11    cyclobenzaprine (FLEXERIL) 10 MG tablet     Sig: Take 1 tablet (10 mg total) by mouth every evening.     Dispense:  30 tablet     Refill:  2    HYDROcodone-acetaminophen (NORCO)  mg per tablet     Sig: Take 1 tablet by mouth every 24 hours as needed for Pain.     Dispense:  30 tablet     Refill:  0     Quantity prescribed more than 7 day supply? Yes, quantity medically necessary     Order Specific Question:   I have reviewed the Prescription Drug Monitoring Program (PDMP) database for this patient prior to prescribing the above opioid medication     Answer:   Yes     HYDROcodone-acetaminophen (NORCO)  mg per tablet     Sig: Take 1 tablet by mouth every 24 hours as needed for Pain.     Dispense:  30 tablet     Refill:  0     Quantity prescribed more than 7 day supply? Yes, quantity medically necessary     Order Specific Question:   I have reviewed the Prescription Drug Monitoring Program (PDMP) database for this patient prior to prescribing the above opioid medication     Answer:   Yes    HYDROcodone-acetaminophen (NORCO)  mg per tablet     Sig: Take 1 tablet by mouth every 24 hours as needed for Pain.     Dispense:  30 tablet     Refill:  0     Quantity prescribed more than 7 day supply? Yes, quantity medically necessary     Order Specific Question:   I have reviewed the Prescription Drug Monitoring Program (PDMP) database for this patient prior to prescribing the above opioid medication     Answer:   Yes         Follow Up: OV 3 months  Future Appointments   Date Time Provider Department Center   1/2/2024  1:20 PM Rogelio March MD [facility]          Subjective:   Subjective   Chief Complaint   Patient presents with    Low-back Pain       HPI  Paulino is a 68 y.o. male.     Social History     Tobacco Use    Smoking status: Every Day     Current packs/day: 0.25     Types: Cigarettes     Passive exposure: Current    Smokeless tobacco: Never    Tobacco comments:     Not ready to stop smoking vs    Substance Use Topics    Alcohol use: Not Currently      Social History     Occupational History    Occupation: disabled - orthopedic      Social History     Social History Narrative    Not on file       Last appointment with this clinic was 8/25/2023. Last visit with me 8/25/2023   To summarize last visit and events leading up to today:  Chronic back pain, chronic narcotic dependence, stable   [facility]  COPD, smoker   Coronary artery disease  Hypertension  11/9/22 admit [facility] pna with sepsis, NIKOLAS, new AFib, new cardiomyopathy. On DOAC, BB,  losartan. Reportedly [facility]. [facility] started him on spironolactone.  And stopped his amiodarone    Medications from [facility] record:    Medication Details   ALBUTEROL 90MCG/ACTUAT (CFC-F) INHL,ORAL,8.5GM DOSE COUNTER INHALE 2 PUFFS BY MOUTH EVERY FOUR HOURS AS NEEDED AS A RESCUE INHALER   ALLOPURINOL 100MG TAB TAKE ONE TABLET BY MOUTH ONCE DAILY FOR GOUT   ASPIRIN 81MG TAB,EC TAKE ONE TABLET BY MOUTH EVERY DAY TO PREVENT BLOOD CLOT   FAMOTIDINE 20MG TAB TAKE ONE TABLET BY MOUTH ONCE DAILY AS NEEDED FOR GASTROESOPHAGEAL REFLUX DISEASE   FLUTICASONE 500MCG/SALMETEROL 50MCG INHL,ORAL,DISKUS,60 INHALE 1 PUFF BY MOUTH TWICE A DAY FOR COPD   HYDROCODONE 10MG/ACETAMINOPHEN 325MG TAB TAKE ONE TABLET BY MOUTH EVERY DAY AS NEEDED   LOSARTAN POTASSIUM 100MG TAB TAKE ONE TABLET BY MOUTH EVERY DAY FOR BLOOD PRESSURE   METFORMIN HCL 500MG 24HR TAB,SA TAKE TWO TABLETS BY MOUTH TWICE A DAY FOR DIABETES   ROSUVASTATIN CA 40MG TAB TAKE ONE-HALF TABLET BY MOUTH EVERY DAY FOR CHOLESTEROL   SPIRONOLACTONE 25MG TAB TAKE ONE-HALF TABLET (12.5MG) BY MOUTH ONCE DAILY FOR HEART FAILURE      12/18, 11/20, 10/20    No longer taking coreg?     Today's visit:    Taking chronic narcotic as prescribed, denies issues with medication, finds it keeps him functional.    Still staying active  Requesting prescription for Flexeril to take p.r.n. for muscle aches and tightness, predominantly in the shoulder/neck area.    Reviewed his medications, looks like he is on H2 blocker no longer on PPI  Metformin, [facility] has not listed as taking 1000 b.i.d., but he is taking 500 b.i.d..  I have asked him to clarify   He reports he is also still taking glipizide.      Has some nasal congestion with onset of cold weather, is requesting refill on nasal prescription.  Looks like he is taking Astelin.  He thinks he normally [facility] with the [facility] but is requesting I send in 1 to see if he can get it filled sooner.    Patient Care Team:  Rogelio March MD as PCP -  General (Internal Medicine)  Fermin Lee Jr., MD as Consulting Physician (Family Medicine)  [facility]  Priyanka Guerra MD (Internal Medicine)  Fito Bowles Sr., MD as Consulting Physician (Ophthalmology)  Rock Quijano MD (Cardiology)  Administration, [facility], Melissa NIELSEN MA as Care Coordinator    Patient Active Problem List    Diagnosis Date Noted    Esophageal varices without bleeding, unspecified esophageal varices type 02/01/2023    Aortic atherosclerosis 02/01/2023 4/13/20 CT abd/pelvis There are calcifications in the abdominal aorta and its branch vessels      Combined forms of age-related cataract, left eye 02/01/2023    Erectile dysfunction 02/01/2023    Cardiomyopathy 11/04/2022 11/02/2022 TTE LV mildly enlarged with eccentric hypertrophy moderately decreased systolic function.  LVEF 35%.  Grade 2 diastolic dysfunction.  Segmental left ventricular wall motion abnormalities.  Normal RV size and RV systolic function.      New onset atrial fibrillation in the setting of left lower lobe pneumonia 11/02/2022    VT (ventricular tachycardia) 11/02/2022    Tobacco abuse counseling 10/30/2022    Common bile duct dilatation on CT and MRI, tumor markers negative, no further imaging 07/29/2021    Ventral hernia without obstruction or gangrene 06/08/2020    Anemia of chronic disease on labs 9/2019 09/11/2019    GERD (gastroesophageal reflux disease) 05/14/2019    COPD (chronic obstructive pulmonary disease) with reported hx of abn PFTs 05/14/2019 11/9/22 CT chest: Centrilobular emphysema.      Primary osteoarthritis of both knees hx of injection 05/14/2019    History of hepatitis C s/p SVR 2014 per [facility] care [facility].  [facility] with labs, reported MRI liver 8/2018 08/29/2018    Idiopathic gout of multiple sites 11/24/2017    Chronic midline low back pain without sciatica; by report, 9/8/2011 MRI lumbar spine schmorl's nodes and ligamentous hypertrophy, root impingment  noted 10/24/2017    Chronic narcotic use 10/24/2017    Essential hypertension 10/24/2017    Controlled type 2 diabetes mellitus with microalbuminuria, without long-term current use of insulin 10/24/2017    Coronary artery disease involving native coronary artery of native heart without angina pectoris 10/24/2017    Benign prostatic hyperplasia without lower urinary tract symptoms 10/24/2017    Smoker 10/24/2017       PAST MEDICAL PROBLEMS, PAST SURGICAL HISTORY: please see relevant portions of the electronic medical record    ALLERGIES AND MEDICATIONS: updated and reviewed.  Medication List with Changes/Refills   Current Medications    ALBUTEROL (PROVENTIL/VENTOLIN HFA) 90 MCG/ACTUATION INHALER    INHALE 2 PUFFS BY MOUTH EVERY FOUR HOURS AS NEEDED AS A RESCUE INHALER    ALLOPURINOL (ZYLOPRIM) 100 MG TABLET    Take 1 tablet (100 mg total) by mouth once daily. Decreased dose    ASPIRIN (ECOTRIN) 81 MG EC TABLET    Take 81 mg by mouth once daily.    AZELASTINE (ASTELIN) 137 MCG (0.1 %) NASAL SPRAY    1 spray (137 mcg total) by Nasal route 2 (two) times daily.    CARVEDILOL (COREG) 12.5 MG TABLET    Take 12.5 mg by mouth 2 (two) times daily with meals.    CETIRIZINE (ZYRTEC) 10 MG TABLET    cetirizine 10 mg tablet   Take 1 tablet every day by oral route as needed for 30 days.    HYDROCODONE-ACETAMINOPHEN (NORCO)  MG PER TABLET    Take 1 tablet by mouth every 24 hours as needed for Pain.    HYDROCODONE-ACETAMINOPHEN (NORCO)  MG PER TABLET    Take 1 tablet by mouth every 24 hours as needed for Pain.    HYDROCODONE-ACETAMINOPHEN (NORCO)  MG PER TABLET    Take 1 tablet by mouth every 24 hours as needed for Pain.    LIDOCAINE (LIDODERM) 5 %    APPLY 3 PATCHES TOPICALLY EVERY DAY FOR PAIN. WEAR FOR 12 HOURS, THEN REMOVE. DO NOT APPLY NEW PATCH FOR AT LEAST 12 HOURS.    LOSARTAN (COZAAR) 100 MG TABLET    Take 100 mg by mouth once daily.    METFORMIN (FORTAMET) 500 MG 24HR TABLET    Take 500 mg by mouth 2  "(two) times daily with meals.    METHYL SALICYLATE-MENTHOL 15-10% 15-10 % CREA    APPLY MODERATE AMOUNT TOPICALLY ONCE DAILY AS NEEDED    NARCAN 4 MG/ACTUATION SPRY        OMEPRAZOLE (PRILOSEC) 40 MG CAPSULE    Take 40 mg by mouth once daily.    ROSUVASTATIN (CRESTOR) 40 MG TAB    Take 20 mg by mouth every evening.    SILDENAFIL (VIAGRA) 50 MG TABLET    TAKE ONE-HALF TABLET BY MOUTH EVERY SEVEN DAYS AS NEEDED 30 TO 60 MINUTES BEFORE INTERCOURSE FOR BEST RESULTS TAKE ON EMPTY STOMACH    SPIRONOLACTONE (ALDACTONE) 25 MG TABLET    Take 12.5 mg by mouth once daily.    TIOTROPIUM BROMIDE (SPIRIVA RESPIMAT) 1.25 MCG/ACTUATION INHALER    INHALE 2 PUFFS BY MOUTH EVERY DAY         Objective:   Objective   Physical Exam   Vitals:    01/02/24 1158   BP: 120/70   BP Location: Right arm   Patient Position: Sitting   BP Method: Medium (Manual)   Pulse: 85   Temp: 98.5 °F (36.9 °C)   TempSrc: Oral   SpO2: 95%   Weight: 81.3 kg (179 lb 2 oz)   Height: 6' 2" (1.88 m)    Body mass index is 23 kg/m².            Physical Exam  Constitutional:       General: He is not in acute distress.     Appearance: He is well-developed.   Eyes:      Extraocular Movements: Extraocular movements intact.   Cardiovascular:      Rate and Rhythm: Normal rate and regular rhythm.      Heart sounds: Normal heart sounds. No murmur heard.  Pulmonary:      Effort: Pulmonary effort is normal.      Breath sounds: Normal breath sounds.   Musculoskeletal:         General: Normal range of motion.   Skin:     General: Skin is warm and dry.   Neurological:      Mental Status: He is alert and oriented to person, place, and time.      Coordination: Coordination normal.   Psychiatric:         Behavior: Behavior normal.         Thought Content: Thought content normal.                "

## 2024-01-02 ENCOUNTER — OFFICE VISIT (OUTPATIENT)
Dept: FAMILY MEDICINE | Facility: CLINIC | Age: 69
End: 2024-01-02
Payer: MEDICARE

## 2024-01-02 VITALS
OXYGEN SATURATION: 95 % | HEIGHT: 74 IN | BODY MASS INDEX: 22.99 KG/M2 | HEART RATE: 85 BPM | SYSTOLIC BLOOD PRESSURE: 120 MMHG | WEIGHT: 179.13 LBS | TEMPERATURE: 99 F | DIASTOLIC BLOOD PRESSURE: 70 MMHG

## 2024-01-02 DIAGNOSIS — K21.9 GASTROESOPHAGEAL REFLUX DISEASE, UNSPECIFIED WHETHER ESOPHAGITIS PRESENT: ICD-10-CM

## 2024-01-02 DIAGNOSIS — I10 ESSENTIAL HYPERTENSION: ICD-10-CM

## 2024-01-02 DIAGNOSIS — J44.9 CHRONIC OBSTRUCTIVE PULMONARY DISEASE, UNSPECIFIED COPD TYPE: ICD-10-CM

## 2024-01-02 DIAGNOSIS — M62.89 MUSCLE TIGHTNESS: ICD-10-CM

## 2024-01-02 DIAGNOSIS — E11.29 CONTROLLED TYPE 2 DIABETES MELLITUS WITH MICROALBUMINURIA, WITHOUT LONG-TERM CURRENT USE OF INSULIN: ICD-10-CM

## 2024-01-02 DIAGNOSIS — R80.9 CONTROLLED TYPE 2 DIABETES MELLITUS WITH MICROALBUMINURIA, WITHOUT LONG-TERM CURRENT USE OF INSULIN: ICD-10-CM

## 2024-01-02 DIAGNOSIS — I85.00 ESOPHAGEAL VARICES WITHOUT BLEEDING, UNSPECIFIED ESOPHAGEAL VARICES TYPE: ICD-10-CM

## 2024-01-02 DIAGNOSIS — I70.0 AORTIC ATHEROSCLEROSIS: ICD-10-CM

## 2024-01-02 DIAGNOSIS — F11.90 CHRONIC NARCOTIC USE: Primary | ICD-10-CM

## 2024-01-02 DIAGNOSIS — J31.0 NONALLERGIC RHINITIS: ICD-10-CM

## 2024-01-02 DIAGNOSIS — I42.9 CARDIOMYOPATHY, UNSPECIFIED TYPE: ICD-10-CM

## 2024-01-02 DIAGNOSIS — I48.91 ATRIAL FIBRILLATION, UNSPECIFIED TYPE: ICD-10-CM

## 2024-01-02 DIAGNOSIS — F11.20 OPIOID DEPENDENCE, UNCOMPLICATED: ICD-10-CM

## 2024-01-02 DIAGNOSIS — M54.50 CHRONIC MIDLINE LOW BACK PAIN WITHOUT SCIATICA: ICD-10-CM

## 2024-01-02 DIAGNOSIS — G89.29 CHRONIC MIDLINE LOW BACK PAIN WITHOUT SCIATICA: ICD-10-CM

## 2024-01-02 PROCEDURE — 99213 OFFICE O/P EST LOW 20 MIN: CPT | Mod: PBBFAC,PO | Performed by: INTERNAL MEDICINE

## 2024-01-02 PROCEDURE — 99999 PR PBB SHADOW E&M-EST. PATIENT-LVL III: CPT | Mod: PBBFAC,,, | Performed by: INTERNAL MEDICINE

## 2024-01-02 PROCEDURE — 99214 OFFICE O/P EST MOD 30 MIN: CPT | Mod: S$PBB,,, | Performed by: INTERNAL MEDICINE

## 2024-01-02 RX ORDER — CYCLOBENZAPRINE HCL 10 MG
10 TABLET ORAL NIGHTLY
Qty: 30 TABLET | Refills: 2 | Status: SHIPPED | OUTPATIENT
Start: 2024-01-02

## 2024-01-02 RX ORDER — HYDROCODONE BITARTRATE AND ACETAMINOPHEN 10; 325 MG/1; MG/1
1 TABLET ORAL
Qty: 30 TABLET | Refills: 0 | Status: SHIPPED | OUTPATIENT
Start: 2024-03-16

## 2024-01-02 RX ORDER — HYDROCODONE BITARTRATE AND ACETAMINOPHEN 10; 325 MG/1; MG/1
1 TABLET ORAL
Qty: 30 TABLET | Refills: 0 | Status: SHIPPED | OUTPATIENT
Start: 2024-02-16

## 2024-01-02 RX ORDER — GLIPIZIDE 5 MG/1
TABLET ORAL
COMMUNITY

## 2024-01-02 RX ORDER — HYDROCODONE BITARTRATE AND ACETAMINOPHEN 10; 325 MG/1; MG/1
1 TABLET ORAL
Qty: 30 TABLET | Refills: 0 | Status: SHIPPED | OUTPATIENT
Start: 2024-01-17

## 2024-01-02 RX ORDER — ALBUTEROL SULFATE 90 UG/1
AEROSOL, METERED RESPIRATORY (INHALATION)
COMMUNITY
Start: 2023-05-08

## 2024-01-02 RX ORDER — VALSARTAN 320 MG/1
TABLET ORAL
COMMUNITY
End: 2024-01-02 | Stop reason: ALTCHOICE

## 2024-01-02 RX ORDER — IRBESARTAN 300 MG/1
TABLET ORAL
COMMUNITY
End: 2024-01-02 | Stop reason: ALTCHOICE

## 2024-01-02 RX ORDER — LOSARTAN POTASSIUM 100 MG/1
1 TABLET ORAL DAILY
COMMUNITY
Start: 2023-07-10

## 2024-01-02 RX ORDER — FLUTICASONE PROPIONATE AND SALMETEROL 500; 50 UG/1; UG/1
POWDER RESPIRATORY (INHALATION)
COMMUNITY
Start: 2023-07-10 | End: 2024-01-02 | Stop reason: ALTCHOICE

## 2024-01-02 RX ORDER — FAMOTIDINE 20 MG/1
20 TABLET, FILM COATED ORAL
COMMUNITY
Start: 2023-07-10

## 2024-01-02 RX ORDER — AZELASTINE 1 MG/ML
1 SPRAY, METERED NASAL 2 TIMES DAILY
Qty: 30 ML | Refills: 11 | Status: SHIPPED | OUTPATIENT
Start: 2024-01-02 | End: 2025-01-01

## 2024-01-02 RX ORDER — AMLODIPINE BESYLATE 2.5 MG/1
TABLET ORAL
COMMUNITY
End: 2024-01-02 | Stop reason: ALTCHOICE

## 2024-01-03 DIAGNOSIS — Z71.89 COMPLEX CARE COORDINATION: ICD-10-CM

## 2024-01-18 ENCOUNTER — PATIENT OUTREACH (OUTPATIENT)
Dept: ADMINISTRATIVE | Facility: HOSPITAL | Age: 69
End: 2024-01-18
Payer: MEDICARE

## 2024-01-31 ENCOUNTER — TELEPHONE (OUTPATIENT)
Dept: FAMILY MEDICINE | Facility: CLINIC | Age: 69
End: 2024-01-31
Payer: MEDICARE

## 2024-01-31 NOTE — TELEPHONE ENCOUNTER
Please call pt - I got results of a CT chest, but I did not order it.  Who ordered it/who do we need to send the results to? Need contact info - phone # or fax # of ordering provider

## 2024-02-01 ENCOUNTER — TELEPHONE (OUTPATIENT)
Dept: FAMILY MEDICINE | Facility: CLINIC | Age: 69
End: 2024-02-01
Payer: MEDICARE

## 2024-02-01 NOTE — TELEPHONE ENCOUNTER
----- Message from Yina Egan sent at 2/1/2024  8:08 AM CST -----  Type:  Patient Returning Call    Who Called:  self     Who Left Message for Patient:  Rena     Does the patient know what this is regarding?: no    Would the patient rather a call back or a response via [facility]?  call    Best Call Back Number: .[phone] (home)

## 2024-02-01 NOTE — TELEPHONE ENCOUNTER
ACCS Attending    Less jerking since started on depakote yesterday  Remains on propofol gtt  Still hypertensive with cares  Having fever. Sputum 12/4 growing moraxella    On exam  On diprivan 60 mcg/kg/min  Not in distress  Neuro Opens eyes to name / loud voice  Grimaces a lot to pain in all limbs, wdl to pain all limbs  Not following commands  On invasive vent support, PS 10 peep 8 Fio2 30%    The pt is critically ill with:  Acute non traumatic anoxic encephalopathy post cardiac arrest  Coma persists but depth of coma not as bad as would be expected with episodes of myoclonus  Myoclonus is better controlled on depakote but not completely  New fever concerning, likely sec to moraxella resp infection  Acute resp failure with hypoxemia and hypercapnea    P/  -Needs more time to sort out neuro status  -Increase depakote to 500mg q6hrs  -Ct propofol gtt for now  -Ct to monitor LFT  -Ct prn midazolam if break through jerking  -Ct continuous eeg for another 24 hrs  -Blood culture  -Cefuroxime for moraxella  -Acetaminophen, ice packs for temp control  -Ct vent support    Crit care time I provided ([id number]): 30 mins (separate from time spent by other providers)    Brian Foote MD  [phone]     Message left to return call to clinic

## 2024-02-02 ENCOUNTER — DOCUMENTATION ONLY (OUTPATIENT)
Dept: PULMONOLOGY | Facility: CLINIC | Age: 69
End: 2024-02-02
Payer: MEDICARE

## 2024-02-08 ENCOUNTER — TELEPHONE (OUTPATIENT)
Dept: PULMONOLOGY | Facility: CLINIC | Age: 69
End: 2024-02-08
Payer: MEDICARE

## 2024-02-08 NOTE — TELEPHONE ENCOUNTER
Called to confirm [facility] and the results.  Faxed request for referral to Pulmonary sent 1/31/24 with additional request.  Informed pt of request for pulmonary at [facility], pt would prefer to wait until he hears back from the [facility] to initiate any referrals or give smoking history.

## 2024-04-07 NOTE — PROGRESS NOTES
This note was created by combination of typed  and M-Modal dictation.  Transcription errors may be present.  If there are any questions, please contact me.    Assessment and Plan:   Assessment and Plan   Chronic midline low back pain without sciatica  Chronic narcotic use  - queried, no aberrancy  Taking prescription narcotic as prescribed  Intensive monitoring of high risk medication.  -     HYDROcodone-acetaminophen (NORCO)  mg per tablet; Take 1 tablet by mouth every 24 hours as needed for Pain.  Dispense: 30 tablet; Refill: 0  -     HYDROcodone-acetaminophen (NORCO)  mg per tablet; Take 1 tablet by mouth every 24 hours as needed for Pain.  Dispense: 30 tablet; Refill: 0  -     HYDROcodone-acetaminophen (NORCO)  mg per tablet; Take 1 tablet by mouth every 24 hours as needed for Pain.  Dispense: 30 tablet; Refill: 0    Pulmonary nodule  -had CT [facility] at [facility]  Reports that the [facility] has reviewed the [facility] with him and plan is to follow up with him regarding the next steps regarding surveillance for diagnosis?  He is unclear on the details.    Rash  -dermatitis, unclear the etiology.  [facility].  Discussed that if he has not getting any improvement may need to have him see Dermatology.    New onset atrial fibrillation in the setting of left lower lobe pneumonia   Atrial fibrillation, unspecified type  -[facility] by the [facility].  On beta-blocker, DOAC    Coronary artery disease involving native coronary artery of native heart without angina pectoris  -managed by the [facility].  On beta-blocker, statin, ARB    Controlled type 2 diabetes mellitus with microalbuminuria, without long-term current use of insulin  -[facility] by the [facility].  On metformin.  History of glipizide, stopped    Essential hypertension  -managed by the [facility].  On spironolactone, beta-blocker, ARB    Smoker  Chronic obstructive pulmonary disease, unspecified COPD type  -[facility] by the [facility].   On Spiriva, advair             There are no discontinued medications.    meds sent this encounter:         Follow Up:   Future Appointments   Date Time Provider Department Center   4/8/2024  3:00 PM Rogelio March MD [facility]          Subjective:   Subjective   Chief Complaint   Patient presents with    [facility] Follow Up Visit       ALEKSANDER Bob is a 68 y.o. male.     Social History     Tobacco Use    Smoking status: Every Day     Current packs/day: 0.25     Types: Cigarettes     Passive exposure: Current    Smokeless tobacco: Never    Tobacco comments:     Not ready to stop smoking vs    Substance Use Topics    Alcohol use: Not Currently      Social History     Occupational History    Occupation: disabled - orthopedic      Social History     Social History Narrative    Not on file       Last appointment with this clinic was 1/2/2024. Last visit with me 1/2/2024   To summarize last visit and events leading up to today:  Chronic back pain, chronic narcotic dependence, stable   [facility]  COPD, smoker   Coronary artery disease  Hypertension  11/9/22 admit [facility] pna with sepsis, NIKOLAS, new AFib, new cardiomyopathy. On DOAC, BB, losartan. Reportedly [facility] the [facility]. [facility] started him on spironolactone.  And stopped his amiodarone  Last visit 1/2/24 off of BB. On ARB on spironolactone    1/30/24 CT lung  Impression:  Lung-RADS Category: 4A - Suspicious - consultation advised - possible next steps 3 month LDCT -in some scenarios PET/CT. Three pulmonary nodules meeting criteria for follow-up CT in 3 months. Two of these nodules were identified on prior exam and were unchanged. The 3rd nodule in the left lung was obscured by adjacent consolidation on prior exam.  Clinically or potentially clinically significant non lung cancer finding: S - Significant. Mild aneurysmal dilatation of the ascending aorta measuring up to 4.3 cm and the descending aorta measuring up to 3.4 cm.  Severe calcific coronary  artery atherosclerosis.      3/19, 2/16    Today's visit:    Reviewed the results of his CT scan.  He does not know the [facility] but he does not know that someone is monitoring him for this and reports that he has an upcoming follow-up to discuss next steps.    Looks like he had some labs done in January.  Reviewed the results.  UDS positive for opiates but also for THC.  He denies TC use.  Possibly from CBD cream?    Has not itchy rash on his arms and legs.  Has been present for quite awhile.  Not sure what the causes.  Looks like he was given prescription for urea and Eucerin creams.  Had not really been using it regularly but trying to use it a bit more frequently.  We talked about differential including drug rash, environmental allergies, unclear the etiology.  No sick contacts.    Reports he is taking the hydrocodone as prescribed.  Finds it effective.  Keeps him functional.  Denies side effects.    Reviewed his medications.  Taking carvedilol, spironolactone, Eliquis  Taking Spiriva, albuterol, advair  Taking metformin extended release   Taking rosuvastatin  No longer taking glipizide    [facility] in [facility], reviewed   Urine drug screen positive for opioids, THC.      Urine with micro albuminuria  CBC 7.4/12.0/36.0/189  Metabolic panel 138/4.3/on 01/27/10/1.1 glucose 111  AST 27 ALT 26 alk-phos 90 total bilirubin 0.6 total protein 7.4 albumin 4.2  A1c 7.2   Lipid profile TC 76 TG 49 HDL 40 LDL 21      Results  Order Name Results Value Reference Range Date   DRUG SCREEN, URINE AMPHETAMINE [PRESENCE] IN URINE BY SCREEN METHOD Negative   01/08/2024   DRUG SCREEN, URINE BENZODIAZEPINES [PRESENCE] IN URINE BY SCREEN METHOD Negative   01/08/2024   DRUG SCREEN, URINE COCAINE [PRESENCE] IN URINE BY SCREEN METHOD Negative   01/08/2024   DRUG SCREEN, URINE CANNABINOIDS [PRESENCE] IN URINE BY SCREEN METHOD POSITIVE   01/08/2024   DRUG SCREEN, URINE OPIATES [PRESENCE] IN URINE BY SCREEN  METHOD POSITIVE   01/08/2024   DRUG SCREEN, URINE METHADONE [PRESENCE] IN URINE BY SCREEN METHOD Negative   01/08/2024   DRUG SCREEN, URINE BUPRENORPHINE [PRESENCE] IN URINE Negative   01/08/2024   DRUG SCREEN, URINE CREATININE [MASS/VOLUME] IN URINE 197.5 20 01/08/2024   DRUG SCREEN, URINE OXYCODONE URN SCREEN Negative   01/08/2024   MICROALBUMIN/CREATININE RATIO PANEL CREATININE [MASS/VOLUME] IN URINE 197.6   01/08/2024   MICROALBUMIN/CREATININE RATIO PANEL MICROALBUMIN [MASS/VOLUME] IN URINE 19.3 &lt;1.9 - 1.9 01/08/2024   MICROALBUMIN/CREATININE RATIO PANEL MICROALBUMIN/CREATININE [MASS RATIO] IN URINE 97.7 0.0 - 30.0 01/08/2024   CBC WITH PLATELET COUNT LEUKOCYTES [#/VOLUME] IN BLOOD BY AUTOMATED COUNT 7.4 4.8 - 10.8 01/08/2024   CBC WITH PLATELET COUNT ERYTHROCYTES [#/VOLUME] IN BLOOD BY AUTOMATED COUNT 3.92 4.50 - 6.10 01/08/2024   CBC WITH PLATELET COUNT HEMOGLOBIN [MASS/VOLUME] IN BLOOD 12.0 14.0 - 18.0 01/08/2024   CBC WITH PLATELET COUNT HEMATOCRIT [VOLUME FRACTION] OF BLOOD BY AUTOMATED COUNT 36.0 42.0 - 52.0 01/08/2024   CBC WITH PLATELET COUNT MCV [ENTITIC VOLUME] BY AUTOMATED COUNT 91.7 81.0 - 98.0 01/08/2024   CBC WITH PLATELET COUNT MCH [ENTITIC MASS] BY AUTOMATED COUNT 30.5 27.0 - 32.6 01/08/2024   CBC WITH PLATELET COUNT MCHC [MASS/VOLUME] BY AUTOMATED COUNT 33.3 32.2 - 34.8 01/08/2024   CBC WITH PLATELET COUNT PLATELETS [#/VOLUME] IN BLOOD BY AUTOMATED COUNT 189 140 - 420 01/08/2024   CBC WITH PLATELET COUNT PLATELET MEAN VOLUME [ENTITIC VOLUME] IN BLOOD BY AUTOMATED COUNT 8.9 7.4 - 10.8 01/08/2024   CBC WITH PLATELET COUNT ERYTHROCYTE DISTRIBUTION WIDTH [RATIO] BY AUTOMATED COUNT 14.9 11.8 - 14.9 01/08/2024   COMPREHENSIVE METABOLIC PANEL CREATININE [MASS/VOLUME] IN SERUM OR PLASMA 1.1 0.6 - 1.3 01/08/2024   COMPREHENSIVE METABOLIC PANEL UREA NITROGEN [MASS/VOLUME] IN SERUM OR PLASMA 10 7 - 20 01/08/2024   COMPREHENSIVE METABOLIC PANEL GLUCOSE [MASS/VOLUME] IN SERUM OR PLASMA 111 70 - 110  01/08/2024   COMPREHENSIVE METABOLIC PANEL SODIUM [MOLES/VOLUME] IN SERUM OR PLASMA 138 136 - 144 01/08/2024   COMPREHENSIVE METABOLIC PANEL POTASSIUM [MOLES/VOLUME] IN SERUM OR PLASMA 4.3 3.6 - 5.1 01/08/2024   COMPREHENSIVE METABOLIC PANEL CHLORIDE [MOLES/VOLUME] IN SERUM OR PLASMA 101 101 - 111 01/08/2024   COMPREHENSIVE METABOLIC PANEL CARBON DIOXIDE, TOTAL [MOLES/VOLUME] IN SERUM OR PLASMA 27 22 - 32 01/08/2024   COMPREHENSIVE METABOLIC PANEL CALCIUM [MASS/VOLUME] IN SERUM OR PLASMA 9.3 8.9 - 10.3 01/08/2024   COMPREHENSIVE METABOLIC PANEL PROTEIN [MASS/VOLUME] IN SERUM OR PLASMA 7.4 6.7 - 8.5 01/08/2024   COMPREHENSIVE METABOLIC PANEL ALBUMIN [MASS/VOLUME] IN SERUM OR PLASMA 4.2 3.5 - 5.0 01/08/2024   COMPREHENSIVE METABOLIC PANEL BILIRUBIN.TOTAL [MASS/VOLUME] IN SERUM OR PLASMA 0.6 0.1 - 1.3 01/08/2024   COMPREHENSIVE METABOLIC PANEL ALKALINE PHOSPHATASE [ENZYMATIC ACTIVITY/VOLUME] IN SERUM OR PLASMA 90 38 - 126 01/08/2024   COMPREHENSIVE METABOLIC PANEL ASPARTATE AMINOTRANSFERASE [ENZYMATIC ACTIVITY/VOLUME] IN SERUM OR PLASMA 27 15 - 41 01/08/2024   COMPREHENSIVE METABOLIC PANEL ALANINE AMINOTRANSFERASE [ENZYMATIC ACTIVITY/VOLUME] IN SERUM OR PLASMA 26 12 - 63 01/08/2024   COMPREHENSIVE METABOLIC PANEL GLOMERULAR FILTRATION RATE/1.73 SQ M.PREDICTED [VOLUME RATE/AREA] IN SERUM, PLASMA OR BLOOD BY CREATININE-BASED FORMULA (CKD-EPI 2021) 73   01/08/2024   GLYCOLATED HEMOGLOBIN A1C HEMOGLOBIN A1C/HEMOGLOBIN.TOTAL IN BLOOD BY HPLC 7.2 4.2 - 5.8 01/08/2024   LIPID PROFILE TRIGLYCERIDE [MASS/VOLUME] IN SERUM OR PLASMA 49 0 - 200 01/08/2024   LIPID PROFILE CHOLESTEROL IN LDL [MASS/VOLUME] IN SERUM OR PLASMA BY CALCULATION 25.6 0 - 100 01/08/2024   LIPID PROFILE CHOLESTEROL [MASS/VOLUME] IN SERUM OR PLASMA 76 0 - 240 01/08/2024   LIPID PROFILE CHOLESTEROL IN LDL [MASS/VOLUME] IN SERUM OR PLASMA BY DIRECT ASSAY 21   01/08/2024   LIPID PROFILE CHOLESTEROL IN HDL [MASS/VOLUME] IN SERUM OR PLASMA 40.6 40 01/08/2024               Patient Care Team:  Rogelio March MD as PCP - General (Internal Medicine)  Fermin Lee Jr., MD as Consulting Physician (Family Medicine)  [facility]  Priyanka Guerra MD (Internal Medicine)  Fito Bowles Sr., MD as Consulting Physician (Ophthalmology)  Rock Quijano MD (Cardiology)  Administration, [facility], Melissa NIELSEN MA as Care Coordinator    Patient Active Problem List    Diagnosis Date Noted    Esophageal varices without bleeding, unspecified esophageal varices type 02/01/2023    Aortic atherosclerosis 02/01/2023 4/13/20 CT abd/pelvis There are calcifications in the abdominal aorta and its branch vessels      Combined forms of age-related cataract, left eye 02/01/2023    Erectile dysfunction 02/01/2023    Cardiomyopathy 11/04/2022 11/02/2022 TTE LV mildly enlarged with eccentric hypertrophy moderately decreased systolic function.  LVEF 35%.  Grade 2 diastolic dysfunction.  Segmental left ventricular wall motion abnormalities.  Normal RV size and RV systolic function.      New onset atrial fibrillation in the setting of left lower lobe pneumonia 11/02/2022    VT (ventricular tachycardia) 11/02/2022    Tobacco abuse counseling 10/30/2022    Common bile duct dilatation on CT and MRI, tumor markers negative, no further imaging 07/29/2021    Ventral hernia without obstruction or gangrene 06/08/2020    Anemia of chronic disease on labs 9/2019 09/11/2019    GERD (gastroesophageal reflux disease) 05/14/2019    COPD (chronic obstructive pulmonary disease) with reported hx of abn PFTs 05/14/2019 11/9/22 CT chest: Centrilobular emphysema.      Primary osteoarthritis of both knees hx of injection 05/14/2019    History of hepatitis C s/p SVR 2014 [facility].  [facility], reported MRI liver 8/2018 08/29/2018    Idiopathic gout of multiple sites 11/24/2017    Chronic midline low back pain without sciatica; by report, 9/8/2011 MRI lumbar spine  schmorl's nodes and ligamentous hypertrophy, root impingment noted 10/24/2017    Chronic narcotic use 10/24/2017    Essential hypertension 10/24/2017    Controlled type 2 diabetes mellitus with microalbuminuria, without long-term current use of insulin 10/24/2017    Coronary artery disease involving native coronary artery of native heart without angina pectoris 10/24/2017    Benign prostatic hyperplasia without lower urinary tract symptoms 10/24/2017    Smoker 10/24/2017       PAST MEDICAL PROBLEMS, PAST SURGICAL HISTORY: please see relevant portions of the electronic medical record    ALLERGIES AND MEDICATIONS: updated and reviewed.  Medication List with Changes/Refills   Current Medications    ALBUTEROL (PROVENTIL/VENTOLIN HFA) 90 MCG/ACTUATION INHALER    INHALE 2 PUFFS BY MOUTH EVERY FOUR HOURS AS NEEDED AS A RESCUE INHALER    ALLOPURINOL (ZYLOPRIM) 100 MG TABLET    Take 1 tablet (100 mg total) by mouth once daily. Decreased dose    ASPIRIN (ECOTRIN) 81 MG EC TABLET    Take 81 mg by mouth once daily.    AZELASTINE (ASTELIN) 137 MCG (0.1 %) NASAL SPRAY    1 spray (137 mcg total) by Nasal route 2 (two) times daily.    CETIRIZINE (ZYRTEC) 10 MG TABLET    cetirizine 10 mg tablet   Take 1 tablet every day by oral route as needed for 30 days.    CYCLOBENZAPRINE (FLEXERIL) 10 MG TABLET    Take 1 tablet (10 mg total) by mouth every evening.    FAMOTIDINE (PEPCID) 20 MG TABLET    20 mg.    GLIPIZIDE (GLUCOTROL) 5 MG TABLET        HYDROCODONE-ACETAMINOPHEN (NORCO)  MG PER TABLET    Take 1 tablet by mouth every 24 hours as needed for Pain.    HYDROCODONE-ACETAMINOPHEN (NORCO)  MG PER TABLET    Take 1 tablet by mouth every 24 hours as needed for Pain.    HYDROCODONE-ACETAMINOPHEN (NORCO)  MG PER TABLET    Take 1 tablet by mouth every 24 hours as needed for Pain.    LIDOCAINE (LIDODERM) 5 %    APPLY 3 PATCHES TOPICALLY EVERY DAY FOR PAIN. WEAR FOR 12 HOURS, THEN REMOVE. DO NOT APPLY NEW PATCH FOR AT LEAST 12  "HOURS.    LOSARTAN (COZAAR) 100 MG TABLET    Take 1 tablet by mouth once daily.    METFORMIN (FORTAMET) 500 MG 24HR TABLET    Take 500 mg by mouth 2 (two) times daily with meals.    METHYL SALICYLATE-MENTHOL 15-10% 15-10 % CREA    APPLY MODERATE AMOUNT TOPICALLY ONCE DAILY AS NEEDED    NARCAN 4 MG/ACTUATION SPRY        ROSUVASTATIN (CRESTOR) 40 MG TAB    Take 20 mg by mouth every evening.    SILDENAFIL (VIAGRA) 50 MG TABLET    TAKE ONE-HALF TABLET BY MOUTH EVERY SEVEN DAYS AS NEEDED 30 TO 60 MINUTES BEFORE INTERCOURSE FOR BEST RESULTS TAKE ON EMPTY STOMACH    SPIRONOLACTONE (ALDACTONE) 25 MG TABLET    Take 12.5 mg by mouth once daily.    TIOTROPIUM BROMIDE (SPIRIVA RESPIMAT) 1.25 MCG/ACTUATION INHALER    INHALE 2 PUFFS BY MOUTH EVERY DAY         Objective:   Objective   Physical Exam   Vitals:    04/08/24 1433   BP: 124/62   Pulse: 68   Temp: 97.7 °F (36.5 °C)   TempSrc: Oral   SpO2: 98%   Weight: 78.8 kg (173 lb 13.3 oz)   Height: 6' 2" (1.88 m)    Body mass index is 22.32 kg/m².            Physical Exam  Constitutional:       General: He is not in acute distress.     Appearance: He is well-developed.   Eyes:      Extraocular Movements: Extraocular movements intact.   Cardiovascular:      Rate and Rhythm: Normal rate and regular rhythm.      Heart sounds: Normal heart sounds. No murmur heard.  Pulmonary:      Effort: Pulmonary effort is normal.      Breath sounds: Normal breath sounds.   Musculoskeletal:         General: Normal range of motion.      Right lower leg: No edema.      Left lower leg: No edema.   Skin:     General: Skin is warm and dry.      Findings: Rash present.      Comments: Extensor forearms and extensor lower legs with papular patches, excoriated, nondermatomal, no pustules no surrounding erythema or induration   Neurological:      Mental Status: He is alert and oriented to person, place, and time.      Coordination: Coordination normal.   Psychiatric:         Behavior: Behavior normal.         " Thought Content: Thought content normal.

## 2024-04-08 ENCOUNTER — OFFICE VISIT (OUTPATIENT)
Dept: FAMILY MEDICINE | Facility: CLINIC | Age: 69
End: 2024-04-08
Payer: MEDICARE

## 2024-04-08 VITALS
HEART RATE: 68 BPM | WEIGHT: 173.81 LBS | DIASTOLIC BLOOD PRESSURE: 62 MMHG | OXYGEN SATURATION: 98 % | BODY MASS INDEX: 22.31 KG/M2 | TEMPERATURE: 98 F | HEIGHT: 74 IN | SYSTOLIC BLOOD PRESSURE: 124 MMHG

## 2024-04-08 DIAGNOSIS — I48.91 ATRIAL FIBRILLATION, UNSPECIFIED TYPE: ICD-10-CM

## 2024-04-08 DIAGNOSIS — F17.200 SMOKER: ICD-10-CM

## 2024-04-08 DIAGNOSIS — G89.29 CHRONIC MIDLINE LOW BACK PAIN WITHOUT SCIATICA: Primary | ICD-10-CM

## 2024-04-08 DIAGNOSIS — J44.9 CHRONIC OBSTRUCTIVE PULMONARY DISEASE, UNSPECIFIED COPD TYPE: ICD-10-CM

## 2024-04-08 DIAGNOSIS — R80.9 CONTROLLED TYPE 2 DIABETES MELLITUS WITH MICROALBUMINURIA, WITHOUT LONG-TERM CURRENT USE OF INSULIN: ICD-10-CM

## 2024-04-08 DIAGNOSIS — F11.90 CHRONIC NARCOTIC USE: ICD-10-CM

## 2024-04-08 DIAGNOSIS — E11.29 CONTROLLED TYPE 2 DIABETES MELLITUS WITH MICROALBUMINURIA, WITHOUT LONG-TERM CURRENT USE OF INSULIN: ICD-10-CM

## 2024-04-08 DIAGNOSIS — R91.1 PULMONARY NODULE: ICD-10-CM

## 2024-04-08 DIAGNOSIS — R21 RASH: ICD-10-CM

## 2024-04-08 DIAGNOSIS — I48.91 NEW ONSET ATRIAL FIBRILLATION: ICD-10-CM

## 2024-04-08 DIAGNOSIS — M54.50 CHRONIC MIDLINE LOW BACK PAIN WITHOUT SCIATICA: Primary | ICD-10-CM

## 2024-04-08 DIAGNOSIS — I10 ESSENTIAL HYPERTENSION: ICD-10-CM

## 2024-04-08 DIAGNOSIS — I25.10 CORONARY ARTERY DISEASE INVOLVING NATIVE CORONARY ARTERY OF NATIVE HEART WITHOUT ANGINA PECTORIS: ICD-10-CM

## 2024-04-08 PROCEDURE — 99999 PR PBB SHADOW E&M-EST. PATIENT-LVL IV: CPT | Mod: PBBFAC,,, | Performed by: INTERNAL MEDICINE

## 2024-04-08 PROCEDURE — 99214 OFFICE O/P EST MOD 30 MIN: CPT | Mod: PBBFAC,PO | Performed by: INTERNAL MEDICINE

## 2024-04-08 PROCEDURE — 99214 OFFICE O/P EST MOD 30 MIN: CPT | Mod: S$PBB,,, | Performed by: INTERNAL MEDICINE

## 2024-04-08 RX ORDER — FLUTICASONE PROPIONATE AND SALMETEROL 500; 50 UG/1; UG/1
POWDER RESPIRATORY (INHALATION)
COMMUNITY
Start: 2024-01-25

## 2024-04-08 RX ORDER — HYDROCODONE BITARTRATE AND ACETAMINOPHEN 10; 325 MG/1; MG/1
1 TABLET ORAL
Qty: 30 TABLET | Refills: 0 | Status: SHIPPED | OUTPATIENT
Start: 2024-05-08

## 2024-04-08 RX ORDER — HYDROCODONE BITARTRATE AND ACETAMINOPHEN 10; 325 MG/1; MG/1
1 TABLET ORAL
Qty: 30 TABLET | Refills: 0 | Status: SHIPPED | OUTPATIENT
Start: 2024-06-08

## 2024-04-08 RX ORDER — CARVEDILOL 25 MG/1
12.5 TABLET ORAL 2 TIMES DAILY WITH MEALS
COMMUNITY
Start: 2024-02-05

## 2024-04-08 RX ORDER — UREA 200 MG/G
CREAM TOPICAL
COMMUNITY
Start: 2024-03-04

## 2024-04-08 RX ORDER — HYDROCODONE BITARTRATE AND ACETAMINOPHEN 10; 325 MG/1; MG/1
1 TABLET ORAL
Qty: 30 TABLET | Refills: 0 | Status: SHIPPED | OUTPATIENT
Start: 2024-04-08

## 2024-04-10 DIAGNOSIS — E11.9 TYPE 2 DIABETES MELLITUS WITHOUT COMPLICATION: ICD-10-CM

## 2024-06-05 DIAGNOSIS — E11.9 TYPE 2 DIABETES MELLITUS WITHOUT COMPLICATION: ICD-10-CM

## 2024-06-10 ENCOUNTER — PATIENT OUTREACH (OUTPATIENT)
Dept: ADMINISTRATIVE | Facility: HOSPITAL | Age: 69
End: 2024-06-10
Payer: MEDICARE

## 2024-06-10 NOTE — PROGRESS NOTES
Health Maintenance and immunizations reviewed/ updated.  Labs/ micro urine ordered, please schedule.  Health Maintenance Due   Topic Date Due    Diabetes Urine Screening  10/26/2021    Foot Exam  07/20/2022    Lipid Panel  10/26/2022    Hemoglobin A1c  04/30/2023    COVID-19 Vaccine (7 - 2023-24 season) 02/12/2024    Eye Exam  07/19/2024

## 2024-07-18 ENCOUNTER — OFFICE VISIT (OUTPATIENT)
Dept: FAMILY MEDICINE | Facility: CLINIC | Age: 69
End: 2024-07-18
Payer: MEDICARE

## 2024-07-18 VITALS
HEIGHT: 74 IN | DIASTOLIC BLOOD PRESSURE: 68 MMHG | TEMPERATURE: 98 F | HEART RATE: 65 BPM | SYSTOLIC BLOOD PRESSURE: 100 MMHG | OXYGEN SATURATION: 97 % | WEIGHT: 166 LBS | BODY MASS INDEX: 21.3 KG/M2

## 2024-07-18 DIAGNOSIS — M54.50 CHRONIC MIDLINE LOW BACK PAIN WITHOUT SCIATICA: ICD-10-CM

## 2024-07-18 DIAGNOSIS — F11.90 CHRONIC NARCOTIC USE: ICD-10-CM

## 2024-07-18 DIAGNOSIS — G89.29 CHRONIC MIDLINE LOW BACK PAIN WITHOUT SCIATICA: ICD-10-CM

## 2024-07-18 PROCEDURE — 99213 OFFICE O/P EST LOW 20 MIN: CPT | Mod: PBBFAC,PO | Performed by: INTERNAL MEDICINE

## 2024-07-18 PROCEDURE — 99999 PR PBB SHADOW E&M-EST. PATIENT-LVL III: CPT | Mod: PBBFAC,,, | Performed by: INTERNAL MEDICINE

## 2024-07-18 PROCEDURE — 99214 OFFICE O/P EST MOD 30 MIN: CPT | Mod: S$PBB,,, | Performed by: INTERNAL MEDICINE

## 2024-07-18 RX ORDER — HYDROCODONE BITARTRATE AND ACETAMINOPHEN 10; 325 MG/1; MG/1
1 TABLET ORAL
Qty: 30 TABLET | Refills: 0 | Status: SHIPPED | OUTPATIENT
Start: 2024-09-21

## 2024-07-18 RX ORDER — HYDROCODONE BITARTRATE AND ACETAMINOPHEN 10; 325 MG/1; MG/1
1 TABLET ORAL
Qty: 30 TABLET | Refills: 0 | Status: SHIPPED | OUTPATIENT
Start: 2024-07-22

## 2024-07-18 RX ORDER — HYDROCODONE BITARTRATE AND ACETAMINOPHEN 10; 325 MG/1; MG/1
1 TABLET ORAL
Qty: 30 TABLET | Refills: 0 | Status: SHIPPED | OUTPATIENT
Start: 2024-08-21

## 2024-07-18 NOTE — PROGRESS NOTES
This note was created by combination of typed  and M-Modal dictation.  Transcription errors may be present.  If there are any questions, please contact me.    Assessment and Plan:   Assessment and Plan   Chronic narcotic use  Chronic midline low back pain without sciatica  - queried, no aberrancy  Taking prescription narcotic as prescribed  Intensive monitoring of high risk medication.  -     HYDROcodone-acetaminophen (NORCO)  mg per tablet; Take 1 tablet by mouth every 24 hours as needed for Pain.  Dispense: 30 tablet; Refill: 0  -     HYDROcodone-acetaminophen (NORCO)  mg per tablet; Take 1 tablet by mouth every 24 hours as needed for Pain.  Dispense: 30 tablet; Refill: 0  -     HYDROcodone-acetaminophen (NORCO)  mg per tablet; Take 1 tablet by mouth every 24 hours as needed for Pain.  Dispense: 30 tablet; Refill: 0      Medications Discontinued During This Encounter   Medication Reason    HYDROcodone-acetaminophen (NORCO)  mg per tablet Reorder    HYDROcodone-acetaminophen (NORCO)  mg per tablet Reorder    HYDROcodone-acetaminophen (NORCO)  mg per tablet Reorder       meds sent this encounter:  Medications Ordered This Encounter   Medications    HYDROcodone-acetaminophen (NORCO)  mg per tablet     Sig: Take 1 tablet by mouth every 24 hours as needed for Pain.     Dispense:  30 tablet     Refill:  0     Quantity prescribed more than 7 day supply? Yes, quantity medically necessary     Order Specific Question:   I have reviewed the Prescription Drug Monitoring Program (PDMP) database for this patient prior to prescribing the above opioid medication     Answer:   Yes    HYDROcodone-acetaminophen (NORCO)  mg per tablet     Sig: Take 1 tablet by mouth every 24 hours as needed for Pain.     Dispense:  30 tablet     Refill:  0     Quantity prescribed more than 7 day supply? Yes, quantity medically necessary     Order Specific Question:   I have reviewed the  Prescription Drug Monitoring Program (PDMP) database for this patient prior to prescribing the above opioid medication     Answer:   Yes    HYDROcodone-acetaminophen (NORCO)  mg per tablet     Sig: Take 1 tablet by mouth every 24 hours as needed for Pain.     Dispense:  30 tablet     Refill:  0     Quantity prescribed more than 7 day supply? Yes, quantity medically necessary     Order Specific Question:   I have reviewed the Prescription Drug Monitoring Program (PDMP) database for this patient prior to prescribing the above opioid medication     Answer:   Yes         Follow Up: OV 3 months.  Future Appointments   Date Time Provider Department Center   7/18/2024  3:00 PM [facility]          Subjective:   Subjective   No chief complaint on file.      HPI  Paulino is a 68 y.o. male.     Social History     Tobacco Use    Smoking status: Every Day     Current packs/day: 0.25     Types: Cigarettes     Passive exposure: Current    Smokeless tobacco: Never    Tobacco comments:     Not ready to stop smoking vs    Substance Use Topics    Alcohol use: Not Currently      Social History     Occupational History    Occupation: disabled - orthopedic      Social History     Social History Narrative    Not on file       Last appointment with this clinic was 4/8/2024. Last visit with me 4/8/2024   To summarize last visit and events leading up to today:  Chronic back pain, chronic narcotic dependence, stable   [facility]  COPD, smoker   Coronary artery disease  Hypertension  11/9/22 admit [facility] pna with sepsis, NIKOLAS, new AFib, new cardiomyopathy. On DOAC, BB, losartan. Reportedly [facility]. [facility] spironolactone.  And stopped his amiodarone  Last visit 1/2/24 off of BB. On ARB on spironolactone     1/30/24 CT lung  Impression:  Lung-RADS Category: 4A - Suspicious - consultation advised - possible next steps 3 month LDCT -in some scenarios PET/CT. Three pulmonary  nodules meeting criteria for follow-up CT in 3 months. Two of these nodules were identified on prior exam and were unchanged. The 3rd nodule in the left lung was obscured by adjacent consolidation on prior exam.  Clinically or potentially clinically significant non lung cancer finding: S - Significant. Mild aneurysmal dilatation of the ascending aorta measuring up to 4.3 cm and the descending aorta measuring up to 3.4 cm.  Severe calcific coronary artery atherosclerosis.     Last visit 4/8/24  Lung CT done at [facility] but managed by [facility]  AFib, managed by [facility]  CAD, [facility]  DM, [facility]    ED for epistaxis    Saw provider at [facility] 7/17/24 for COPD?      6/22, 5/22, 4/18    Today's visit:    No complaints.    Taking narcotic as prescribed.  Finds it effective.  Denies side effects.  Denies depression, insomnia, nausea, constipation.  Keeps him physically active and functional.    [facility]  Reports he saw Podiatry.  Normal foot exam.  Has diabetic shoes.  Is followed by Internal Medicine, pulmonology, Cardiology, Ophthalmology, and Podiatry [facility].    Reports that he will be due for CT scan sometime this fall.    Medications-metformin I think the [facility] has him taking 1000 b.i.d. but he is only taking 500 b.i.d..  I have asked him to make sure that he clarifies with them so that they know this is what he was taking    Patient Care Team:  Rogelio March MD as PCP - General (Internal Medicine)  Fermin Lee Jr., MD as Consulting Physician (Family Medicine)  [facility]  Priyanka Guerra MD (Internal Medicine)  Fito Bowles Sr., MD as Consulting Physician (Ophthalmology)  Rock Quijano MD (Cardiology)  [facility] Coordinator    Patient Active Problem List    Diagnosis Date Noted    Pulmonary nodule 04/08/2024    Esophageal varices without bleeding, unspecified esophageal varices type 02/01/2023    Aortic atherosclerosis 02/01/2023 4/13/20 CT  abd/pelvis There are calcifications in the abdominal aorta and its branch vessels      Combined forms of age-related cataract, left eye 02/01/2023    Erectile dysfunction 02/01/2023    Cardiomyopathy 11/04/2022 11/02/2022 TTE LV mildly enlarged with eccentric hypertrophy moderately decreased systolic function.  LVEF 35%.  Grade 2 diastolic dysfunction.  Segmental left ventricular wall motion abnormalities.  Normal RV size and RV systolic function.      New onset atrial fibrillation in the setting of left lower lobe pneumonia 11/02/2022    VT (ventricular tachycardia) 11/02/2022    Tobacco abuse counseling 10/30/2022    Common bile duct dilatation on CT and MRI, tumor markers negative, no further imaging 07/29/2021    Ventral hernia without obstruction or gangrene 06/08/2020    Anemia of chronic disease on labs 9/2019 09/11/2019    GERD (gastroesophageal reflux disease) 05/14/2019    COPD (chronic obstructive pulmonary disease) with reported hx of abn PFTs 05/14/2019 11/9/22 CT chest: Centrilobular emphysema.      Primary osteoarthritis of both knees hx of injection 05/14/2019    History of hepatitis C s/p SVR 2014 [facility].  [facility] with [facility], reported MRI liver 8/2018 08/29/2018    Idiopathic gout of multiple sites 11/24/2017    Chronic midline low back pain without sciatica; by report, 9/8/2011 MRI lumbar spine schmorl's nodes and ligamentous hypertrophy, root impingment noted 10/24/2017    Chronic narcotic use 10/24/2017    Essential hypertension 10/24/2017    Controlled type 2 diabetes mellitus with microalbuminuria, without long-term current use of insulin 10/24/2017    Coronary artery disease involving native coronary artery of native heart without angina pectoris 10/24/2017    Benign prostatic hyperplasia without lower urinary tract symptoms 10/24/2017    Smoker 10/24/2017       PAST MEDICAL PROBLEMS, PAST SURGICAL HISTORY: please see relevant portions of the electronic medical  record    ALLERGIES AND MEDICATIONS: updated and reviewed.  Medication List with Changes/Refills   Current Medications    ALBUTEROL (PROVENTIL/VENTOLIN HFA) 90 MCG/ACTUATION INHALER    INHALE 2 PUFFS BY MOUTH EVERY FOUR HOURS AS NEEDED AS A RESCUE INHALER    ALLOPURINOL (ZYLOPRIM) 100 MG TABLET    Take 1 tablet (100 mg total) by mouth once daily. Decreased dose    APIXABAN (ELIQUIS) 5 MG TAB    Take 5 mg by mouth 2 (two) times daily.    ASPIRIN (ECOTRIN) 81 MG EC TABLET    Take 81 mg by mouth once daily.    AZELASTINE (ASTELIN) 137 MCG (0.1 %) NASAL SPRAY    1 spray (137 mcg total) by Nasal route 2 (two) times daily.    CARVEDILOL (COREG) 25 MG TABLET    Take 12.5 mg by mouth 2 (two) times daily with meals.    CETIRIZINE (ZYRTEC) 10 MG TABLET    cetirizine 10 mg tablet   Take 1 tablet every day by oral route as needed for 30 days.    CYCLOBENZAPRINE (FLEXERIL) 10 MG TABLET    Take 1 tablet (10 mg total) by mouth every evening.    FAMOTIDINE (PEPCID) 20 MG TABLET    20 mg.    FLUTICASONE-SALMETEROL DISKUS INHALER 500-50 MCG    INHALE 1 PUFF BY MOUTH TWICE A DAY FOR COPD    HYDROCODONE-ACETAMINOPHEN (NORCO)  MG PER TABLET    Take 1 tablet by mouth every 24 hours as needed for Pain.    HYDROCODONE-ACETAMINOPHEN (NORCO)  MG PER TABLET    Take 1 tablet by mouth every 24 hours as needed for Pain.    HYDROCODONE-ACETAMINOPHEN (NORCO)  MG PER TABLET    Take 1 tablet by mouth every 24 hours as needed for Pain.    LANOLIN/MINERAL OIL (EUCERIN ORIGINAL TOP)    APPLY LIBERAL AMOUNT TOPICALLY TWICE A DAY AS NEEDED FOR DRY SKIN -- FOR LEGS AND FEET    LIDOCAINE (LIDODERM) 5 %    APPLY 3 PATCHES TOPICALLY EVERY DAY FOR PAIN. WEAR FOR 12 HOURS, THEN REMOVE. DO NOT APPLY NEW PATCH FOR AT LEAST 12 HOURS.    LOSARTAN (COZAAR) 100 MG TABLET    Take 1 tablet by mouth once daily.    METFORMIN (FORTAMET) 500 MG 24HR TABLET    Take 500 mg by mouth 2 (two) times daily with meals.    METHYL SALICYLATE-MENTHOL 15-10% 15-10 %  "CREA    APPLY MODERATE AMOUNT TOPICALLY ONCE DAILY AS NEEDED    NARCAN 4 MG/ACTUATION SPRY        ROSUVASTATIN (CRESTOR) 40 MG TAB    Take 20 mg by mouth every evening.    SILDENAFIL (VIAGRA) 50 MG TABLET    TAKE ONE-HALF TABLET BY MOUTH EVERY SEVEN DAYS AS NEEDED 30 TO 60 MINUTES BEFORE INTERCOURSE FOR BEST RESULTS TAKE ON EMPTY STOMACH    SPIRONOLACTONE (ALDACTONE) 25 MG TABLET    Take 12.5 mg by mouth once daily.    TIOTROPIUM BROMIDE (SPIRIVA RESPIMAT) 1.25 MCG/ACTUATION INHALER    INHALE 2 PUFFS BY MOUTH EVERY DAY    UREA 20 % CREA    APPLY LIBERAL AMOUNT TOPICALLY TWICE A DAY AS NEEDED FOR DRY SKIN -- FOR FEET         Objective:   Objective   Physical Exam   Vitals:    07/18/24 1446   BP: 100/68   Pulse: 65   Temp: 97.8 °F (36.6 °C)   TempSrc: Oral   SpO2: 97%   Weight: 75.3 kg (166 lb 0.1 oz)   Height: 6' 2" (1.88 m)    Body mass index is 21.31 kg/m².            Physical Exam  Constitutional:       General: He is not in acute distress.     Appearance: He is well-developed.   Eyes:      Extraocular Movements: Extraocular movements intact.   Cardiovascular:      Rate and Rhythm: Normal rate and regular rhythm.      Heart sounds: Normal heart sounds. No murmur heard.  Pulmonary:      Effort: Pulmonary effort is normal.      Breath sounds: Normal breath sounds.   Musculoskeletal:         General: Normal range of motion.      Right lower leg: No edema.      Left lower leg: No edema.   Lymphadenopathy:      Cervical: No cervical adenopathy.   Skin:     General: Skin is warm and dry.   Neurological:      Mental Status: He is alert and oriented to person, place, and time.      Coordination: Coordination normal.   Psychiatric:         Behavior: Behavior normal.         Thought Content: Thought content normal.                "

## 2024-08-03 DIAGNOSIS — Z71.89 COMPLEX CARE COORDINATION: ICD-10-CM

## 2024-09-18 ENCOUNTER — TELEPHONE (OUTPATIENT)
Dept: FAMILY MEDICINE | Facility: CLINIC | Age: 69
End: 2024-09-18
Payer: MEDICARE

## 2024-09-18 NOTE — TELEPHONE ENCOUNTER
Called patient and informed that the doctor will not be able to approve for the prescription to be filled before 9/21 due to the pills should last until the next refill. Patient ok and call was ended.

## 2024-09-18 NOTE — TELEPHONE ENCOUNTER
----- Message from Morenita Calderon sent at 9/18/2024  2:04 PM CDT -----  Regarding: HYDROcodone-acetaminophen (NORCO)  mg per tablet  Type:  Patient Returning Call      Name of who is calling:pt        What is request in detail:pt is requesting a call back in regards to HYDROcodone-acetaminophen (NORCO)  mg per tablet. The fill date is 09/21, but patient wants to know if it can be changed because he will be out of medication before that day.        Can clinic reply by [facility]:no        What [facility] to [facility] [phone]

## 2024-10-22 ENCOUNTER — TELEPHONE (OUTPATIENT)
Dept: FAMILY MEDICINE | Facility: CLINIC | Age: 69
End: 2024-10-22
Payer: MEDICARE

## 2024-10-22 LAB
ALBUMIN CREATININE RATIO: 346 MG/G
ALBUMIN, URINE: 510.8 MG/L
ALBUMIN: 4.6
ALP ISOS SERPL LEV INH-CCNC: 111 U/L
ALT: 30
AST: 32
BILIRUBIN, TOTAL: 0.4
BUN/CREAT SERPL: 12
CALCIUM SERPL-MCNC: 10.4 MG/DL
CARBON DIOXIDE, CO2: 24 MMOL/L
CHLORIDE SERPL-SCNC: 104 MMOL/L (ref 99–108)
CHOLESTEROL (LIPID PANEL): 99
CREATININE, URINE: 147.7
CREATININE: 1.11
EGFR: 72
GLOBULIN: 2.9
GLUCOSE: 139
HBA1C MFR BLD: 7.3 %
HDLC SERPL-MCNC: 47 MG/DL
LDLC SERPL CALC-MCNC: 35 MG/DL
POTASSIUM SERPL-SCNC: 4.7 MMOL/L (ref 3.4–5.3)
PROT SERPL-MCNC: 7.5 G/DL
SODIUM BLD-SCNC: 142 MMOL/L (ref 137–147)
TRIGLYCERIDE (LIPID PAN): 83
UREA NITROGEN (BUN): 13 MG/DL
VLDLC SERPL-MCNC: 17 MG/DL

## 2024-10-22 NOTE — TELEPHONE ENCOUNTER
----- Message from Jorge Alberto sent at 10/22/2024  8:37 AM CDT -----  Regarding: self  Type: Patient Call Back    Who called:self    What is the request in detail:pt is asking could he get medication called in even though he has dual insurance, stated his new plan kicks in on nov 1st. Asking could he get a refill on his meds  Would like to speak to the office  Can the clinic reply by [facility]?no    Would the patient rather a call back or a response via [facility]? callback    Best call back number:[phone]    Additional Information:

## 2024-11-15 ENCOUNTER — TELEPHONE (OUTPATIENT)
Dept: FAMILY MEDICINE | Facility: CLINIC | Age: 69
End: 2024-11-15
Payer: MEDICARE

## 2024-11-15 NOTE — TELEPHONE ENCOUNTER
----- Message from Ashley sent at 11/14/2024  1:46 PM CST -----  Regarding: Self  Patient is requesting a sooner appointment.  Patient declined first available appointment listed as well as another facility and provider .  Patient will not accept being placed on the waitlist and is requesting a message be sent to doctor.    Name of Caller: Self     When is the first available appointment?  None are populating     Symptoms: med refills / 3 month fu     Would the patient rather a call back or a response via [facility]?  Call back     Best Call Back Number: .[phone]      Additional Information:

## 2025-01-02 ENCOUNTER — TELEPHONE (OUTPATIENT)
Dept: FAMILY MEDICINE | Facility: CLINIC | Age: 70
End: 2025-01-02
Payer: MEDICARE

## 2025-01-02 NOTE — TELEPHONE ENCOUNTER
----- Message from Yina sent at 1/2/2025  9:30 AM CST -----  Type:  Sooner Appointment Request    Patient is requesting a sooner appointment.  Patient declined first available appointment listed as well as another facility and provider .  Patient will not accept being placed on the waitlist and is requesting a message be sent to doctor.    Name of Caller: self     When is the first available appointment? None are populating     Symptoms: No symptoms Meds refill    Would the patient rather a call back or a response via [facility]? call    Best Call Back Number: .[phone] (home)

## 2025-01-07 NOTE — PROGRESS NOTES
This note was created by combination of typed  and M-Modal dictation.  Transcription errors may be present.   This note was also generated with the assistance of ambient listening technology. Verbal consent was obtained by the patient and accompanying visitor(s) for the recording of patient appointment to facilitate this note. I attest to having reviewed and edited the generated note for accuracy, though some syntax or spelling errors may persist. Please contact the author of this note for any clarification.    Assessment and Plan:   Assessment and Plan   Chronic midline low back pain without sciatica  Chronic narcotic use  Opioid dependence, uncomplicatedMuscle tightness  - queried, no aberrancy  Taking prescription narcotic as prescribed  Intensive monitoring of high risk medication.  -     cyclobenzaprine (FLEXERIL) 10 MG tablet; Take 1 tablet (10 mg total) by mouth every evening.  Dispense: 30 tablet; Refill: 2  -     HYDROcodone-acetaminophen (NORCO)  mg per tablet; Take 1 tablet by mouth every 24 hours as needed for Pain.  Dispense: 30 tablet; Refill: 0  -     HYDROcodone-acetaminophen (NORCO)  mg per tablet; Take 1 tablet by mouth every 24 hours as needed for Pain.  Dispense: 30 tablet; Refill: 0  -     HYDROcodone-acetaminophen (NORCO)  mg per tablet; Take 1 tablet by mouth every 24 hours as needed for Pain.  Dispense: 30 tablet; Refill: 0    Controlled type 2 diabetes mellitus with microalbuminuria, without long-term current use of insulin  -on metformin.  [facility]    Chronic systolic congestive heart failure  Essential hypertension  Cardiomyopathy, unspecified type  -appears euvolemic.  [facility]  On carvedilol, spironolactone, losartan    Coronary artery disease involving native coronary artery of native heart without angina pectoris  -[facility].  On carvedilol, rosuvastatin, aspirin    New onset atrial fibrillation  -[facility].  On  carvedilol and Eliquis    Esophageal varices without bleeding, unspecified esophageal varices type  -[facility]    Chronic obstructive pulmonary disease, unspecified COPD type  -[facility].  [place]      Medications Discontinued During This Encounter   Medication Reason    cyclobenzaprine (FLEXERIL) 10 MG tablet Reorder    HYDROcodone-acetaminophen (NORCO)  mg per tablet Reorder    HYDROcodone-acetaminophen (NORCO)  mg per tablet Reorder    HYDROcodone-acetaminophen (NORCO)  mg per tablet Reorder       meds sent this encounter:  Medications Ordered This Encounter   Medications    cyclobenzaprine (FLEXERIL) 10 MG tablet     Sig: Take 1 tablet (10 mg total) by mouth every evening.     Dispense:  30 tablet     Refill:  2     Pharmacy update refills, keep on file, not requesting Rx to be filled today.    HYDROcodone-acetaminophen (NORCO)  mg per tablet     Sig: Take 1 tablet by mouth every 24 hours as needed for Pain.     Dispense:  30 tablet     Refill:  0     Quantity prescribed more than 7 day supply? Yes, quantity medically necessary     Order Specific Question:   I have reviewed the Prescription Drug Monitoring Program (PDMP) database for this patient prior to prescribing the above opioid medication     Answer:   Yes    HYDROcodone-acetaminophen (NORCO)  mg per tablet     Sig: Take 1 tablet by mouth every 24 hours as needed for Pain.     Dispense:  30 tablet     Refill:  0     Quantity prescribed more than 7 day supply? Yes, quantity medically necessary     Order Specific Question:   I have reviewed the Prescription Drug Monitoring Program (PDMP) database for this patient prior to prescribing the above opioid medication     Answer:   Yes    HYDROcodone-acetaminophen (NORCO)  mg per tablet     Sig: Take 1 tablet by mouth every 24 hours as needed for Pain.     Dispense:  30 tablet     Refill:  0     Quantity prescribed more than 7 day supply? Yes, quantity  medically necessary     Order Specific Question:   I have reviewed the Prescription Drug Monitoring Program (PDMP) database for this patient prior to prescribing the above opioid medication     Answer:   Yes         Follow Up:   Future Appointments   Date Time Provider Department Center   1/8/2025  2:00 PM Rogelio March MD [facility]          Subjective:   Subjective   Chief Complaint   Patient presents with    Follow-up    Medication Refill       HPI  Paulino is a 69 y.o. male.     Social History     Tobacco Use    Smoking status: Every Day     Current packs/day: 0.25     Types: Cigarettes     Passive exposure: Current    Smokeless tobacco: Never    Tobacco comments:     Not ready to stop smoking vs    Substance Use Topics    Alcohol use: Not Currently      Social History     Occupational History    Occupation: disabled - orthopedic      Social History     Social History Narrative    Not on file       Patient Care Team:  Rogelio March MD as PCP - General (Internal Medicine)  Fermin Lee Jr., MD as Consulting Physician (Family Medicine)  [facility]  Priyanka Guerra MD (Internal Medicine)  Fito Bowles Sr., MD as Consulting Physician (Ophthalmology)  Rock Quijano MD (Cardiology)  Administration, [facility]    Last appointment with this clinic was 7/18/2024. Last visit with me 7/18/2024   To summarize last visit and events leading up to today:  Chronic back pain, chronic narcotic dependence, stable   [facility]  COPD, smoker   Coronary artery disease  Hypertension  11/9/22 admit [facility] pna with sepsis, NIKOLAS, new AFib, new cardiomyopathy. On DOAC, BB, losartan. Reported[facility]. [facility] on [facility]actone.  And stopped his amiodarone  Last visit 1/2/24 off of BB. On ARB on spironolactone     1/30/24 CT lung  Impression:  Lung-RADS Category: 4A - Suspicious - consultation advised - possible next steps 3 month LDCT -in some scenarios PET/CT. Three  pulmonary nodules meeting criteria for follow-up CT in 3 months. Two of these nodules were identified on prior exam and were unchanged. The 3rd nodule in the left lung was obscured by adjacent consolidation on prior exam.  Clinically or potentially clinically significant non lung cancer finding: S - Significant. Mild aneurysmal dilatation of the ascending aorta measuring up to 4.3 cm and the descending aorta measuring up to 3.4 cm.  Severe calcific coronary artery atherosclerosis.      Last visit 4/8/24  Lung CT done at [facility] but managed by [facility]  AFib, managed by [facility]  CAD, [facility]  DM, [facility]     ED for epistaxis     Saw provider at [facility] 7/17/24 for COPD?    Last visit me in July  Chronic narcotic dependence stable    10/22/2024 10/23/2024 CMP14+EGFR glucose 139 mg/dL 70-99 above high normal   10/22/2024 10/23/2024 CMP14+EGFR BUN 13 mg/dL 8-27 normal   10/22/2024 10/23/2024 CMP14+EGFR creatinine 1.11 mg/dL 0.76-1.27 normal   10/22/2024 10/23/2024 CMP14+EGFR eGFR 72 mL/min/1.73 >59 normal   10/22/2024 10/23/2024 CMP14+EGFR BUN/creatinine ratio 12   10-24 normal   10/22/2024 10/23/2024 CMP14+EGFR sodium 142 mmol/L 134-144 normal   10/22/2024 10/23/2024 CMP14+EGFR potassium 4.7 mmol/L 3.5-5.2 normal   10/22/2024 10/23/2024 CMP14+EGFR chloride 104 mmol/L  normal   10/22/2024 10/23/2024 CMP14+EGFR carbon dioxide, total 24 mmol/L 20-29 normal   10/22/2024 10/23/2024 CMP14+EGFR calcium 10.4 mg/dL 8.6-10.2 above high normal   10/22/2024 10/23/2024 CMP14+EGFR protein, total 7.5 g/dL 6.0-8.5 normal   10/22/2024 10/23/2024 CMP14+EGFR albumin 4.6 g/dL 3.9-4.9 normal   10/22/2024 10/23/2024 CMP14+EGFR globulin, total 2.9 g/dL 1.5-4.5     10/22/2024 10/23/2024 CMP14+EGFR bilirubin, total 0.4 mg/dL 0.0-1.2 normal   10/22/2024 10/23/2024 CMP14+EGFR alkaline phosphatase 111 IU/L  normal   10/22/2024 10/23/2024 CMP14+EGFR AST (SGOT) 32 IU/L 0-40 normal   10/22/2024 10/23/2024 CMP14+EGFR ALT (SGPT) 30 IU/L 0-44 normal   10/22/2024  10/25/2024 DRUG SCREEN 10 W/CONF, WB amphetamines, ia NEGATIVE NG/mL cutoff:50     10/22/2024 10/25/2024 DRUG SCREEN 10 W/CONF, WB barbiturates, ia NEGATIVE ug/mL cutoff:0.1     10/22/2024 10/25/2024 DRUG SCREEN 10 W/CONF, WB benzodiazepines, ia NEGATIVE NG/mL cutoff:20     10/22/2024 10/25/2024 DRUG SCREEN 10 W/CONF, WB cocaine/metabolite,ia NEGATIVE NG/mL cutoff:25     10/22/2024 10/25/2024 DRUG SCREEN 10 W/CONF, WB phencyclidine, ia NEGATIVE NG/mL cutoff:8     10/22/2024 10/25/2024 DRUG SCREEN 10 W/CONF, WB THC (marijuana) mtb,ia NEGATIVE NG/mL cutoff:5     10/22/2024 10/25/2024 DRUG SCREEN 10 W/CONF, WB opiates, ia NEGATIVE NG/mL cutoff:5     10/22/2024 10/25/2024 DRUG SCREEN 10 W/CONF, WB oxycodones, ia NEGATIVE NG/mL cutoff:5     10/22/2024 10/25/2024 DRUG SCREEN 10 W/CONF, WB methadone, ia NEGATIVE NG/mL cutoff:25     10/22/2024 10/25/2024 DRUG SCREEN 10 W/CONF, WB propoxyphene, ia NEGATIVE NG/mL cutoff:50     10/22/2024 10/23/2024 CBC, PLATELET, NO DIFFERENTIAL WBC 6.5 x10e3/uL 3.4-10.8 normal   10/22/2024 10/23/2024 CBC, PLATELET, NO DIFFERENTIAL RBC 4.39 x10e6/uL 4.14-5.80 normal   10/22/2024 10/23/2024 CBC, PLATELET, NO DIFFERENTIAL hemoglobin 12.9 g/dL 13.0-17.7 below low normal   10/22/2024 10/23/2024 CBC, PLATELET, NO DIFFERENTIAL hematocrit 42.4 % 37.5-51.0 normal   10/22/2024 10/23/2024 CBC, PLATELET, NO DIFFERENTIAL MCV 97 fL 79-97 normal   10/22/2024 10/23/2024 CBC, PLATELET, NO DIFFERENTIAL MCH 29.4 pg 26.6-33.0 normal   10/22/2024 10/23/2024 CBC, PLATELET, NO DIFFERENTIAL MCHC 30.4 g/dL 31.5-35.7 below low normal   10/22/2024 10/23/2024 CBC, PLATELET, NO DIFFERENTIAL RDW 15.3 % 11.6-15.4     10/22/2024 10/23/2024 CBC, PLATELET, NO DIFFERENTIAL platelets 176 x10e3/uL 150-450 normal   10/22/2024 10/23/2024 LIPID PANEL cholesterol, total 99 mg/dL 100-199 below low normal   10/22/2024 10/23/2024 LIPID PANEL triglycerides 83 mg/dL 0-149 normal   10/22/2024 10/23/2024 LIPID PANEL HDL cholesterol 47  mg/dL >39 normal   10/22/2024 10/23/2024 LIPID PANEL VLDL cholesterol manisha 17 mg/dL 5-40     10/22/2024 10/23/2024 LIPID PANEL LDL chol calc ([facility]) 35 mg/dL 0-99     10/22/2024 10/23/2024 ALBUMIN/CREAT RATIO, RANDOM UR creatinine, urine 147.7 mg/dL not estab. normal   10/22/2024 10/23/2024 ALBUMIN/CREAT RATIO, RANDOM UR albumin, urine 510.8 ug/mL not estab.     10/22/2024 10/23/2024 ALBUMIN/CREAT RATIO, RANDOM UR alb/creat ratio 346 mg/g_creat 0-29 above high normal   10/22/2024 10/23/2024 VITAMIN B12 AND FOLATE vitamin B12 888 pg/mL 232-1245 normal   10/22/2024 10/23/2024 VITAMIN B12 AND FOLATE folate (folic acid), serum >20.0 NG/mL >3.0     10/22/2024 10/23/2024 HEMOGLOBIN A1C hemoglobin A1C 7.3 % 4.8-5.6 above high normal   10/22/2024 10/23/2024 VITAMIN D, 25-HYDROXY vitamin D, 25-hydroxy 37.9 NG/mL 30.0-100.0     10/22/2024 10/23/2024 TSH RFX ON ABNORMAL TO FREE T4 TSH 0.970 uIU/mL 0.450-4.500 normal      11/20, 10/22  Today's visit:    History of Present Illness    SOCIAL HISTORY:  -  History:     HPI:  Paulino was unable to see me due to insurance changes, which occurred around October or November. During this period, he sought care at a local clinic, where he received pain medication twice. He has been without pain medication for approximately 1 week. Paulino noticed the absence of medication but denies severe withdrawal symptoms. He states the medication maintains his functionality and ability to perform daily activities.    Paulino denies severe withdrawal symptoms such as jitteriness, sweating, nausea, or diarrhea when without pain medication. He also denies problems with chest pain, shortness of breath, coughing without reason, or swelling in the legs.    Paulino reports occasional use of Flexeril (muscle relaxer), approximately twice when needed.    MEDICATIONS:  - Eliquis, blood thinner  - Carvedilol, twice daily, for blood pressure  - Spironolactone, for heart  - Spiriva inhaler  - Advair  or Wixela inhaler (purple disc)  - Metformin 500 mg, 1 pill in the morning and 1 pill at night, for blood sugar  - Rosuvastatin 40 mg, for cholesterol  - Lisinopril 40 mg, for blood pressure  - Allopurinol, to prevent gout  - Pepcid, for stomach  - Flexeril (muscle relaxer), used about twice when needed  - Pain medication, taken regularly    ROS:  Cardiovascular: no chest pain  Respiratory: no cough, no shortness of breath         ALLERGIES AND MEDICATIONS: updated and reviewed.  Medication List with Changes/Refills   Current Medications    ALBUTEROL (PROVENTIL/VENTOLIN HFA) 90 MCG/ACTUATION INHALER    INHALE 2 PUFFS BY MOUTH EVERY FOUR HOURS AS NEEDED AS A RESCUE INHALER    ALLOPURINOL (ZYLOPRIM) 100 MG TABLET    Take 1 tablet (100 mg total) by mouth once daily. Decreased dose    APIXABAN (ELIQUIS) 5 MG TAB    Take 5 mg by mouth 2 (two) times daily.    ASPIRIN (ECOTRIN) 81 MG EC TABLET    Take 81 mg by mouth once daily.    AZELASTINE (ASTELIN) 137 MCG (0.1 %) NASAL SPRAY    1 spray (137 mcg total) by Nasal route 2 (two) times daily.    CARVEDILOL (COREG) 25 MG TABLET    Take 12.5 mg by mouth 2 (two) times daily with meals.    CETIRIZINE (ZYRTEC) 10 MG TABLET    cetirizine 10 mg tablet   Take 1 tablet every day by oral route as needed for 30 days.    CYCLOBENZAPRINE (FLEXERIL) 10 MG TABLET    Take 1 tablet (10 mg total) by mouth every evening.    FAMOTIDINE (PEPCID) 20 MG TABLET    20 mg.    FLUTICASONE-SALMETEROL DISKUS INHALER 500-50 MCG    INHALE 1 PUFF BY MOUTH TWICE A DAY FOR COPD    HYDROCODONE-ACETAMINOPHEN (NORCO)  MG PER TABLET    Take 1 tablet by mouth every 24 hours as needed for Pain.    HYDROCODONE-ACETAMINOPHEN (NORCO)  MG PER TABLET    Take 1 tablet by mouth every 24 hours as needed for Pain.    HYDROCODONE-ACETAMINOPHEN (NORCO)  MG PER TABLET    Take 1 tablet by mouth every 24 hours as needed for Pain.    LANOLIN/MINERAL OIL (EUCERIN ORIGINAL TOP)    APPLY LIBERAL AMOUNT TOPICALLY  "TWICE A DAY AS NEEDED FOR DRY SKIN -- FOR LEGS AND FEET    LIDOCAINE (LIDODERM) 5 %    APPLY 3 PATCHES TOPICALLY EVERY DAY FOR PAIN. WEAR FOR 12 HOURS, THEN REMOVE. DO NOT APPLY NEW PATCH FOR AT LEAST 12 HOURS.    LOSARTAN (COZAAR) 100 MG TABLET    Take 1 tablet by mouth once daily.    METFORMIN (FORTAMET) 500 MG 24HR TABLET    Take 500 mg by mouth 2 (two) times daily with meals.    METHYL SALICYLATE-MENTHOL 15-10% 15-10 % CREA    APPLY MODERATE AMOUNT TOPICALLY ONCE DAILY AS NEEDED    NARCAN 4 MG/ACTUATION SPRY        ROSUVASTATIN (CRESTOR) 40 MG TAB    Take 20 mg by mouth every evening.    SILDENAFIL (VIAGRA) 50 MG TABLET    TAKE ONE-HALF TABLET BY MOUTH EVERY SEVEN DAYS AS NEEDED 30 TO 60 MINUTES BEFORE INTERCOURSE FOR BEST RESULTS TAKE ON EMPTY STOMACH    SPIRONOLACTONE (ALDACTONE) 25 MG TABLET    Take 12.5 mg by mouth once daily.    TIOTROPIUM BROMIDE (SPIRIVA RESPIMAT) 1.25 MCG/ACTUATION INHALER    INHALE 2 PUFFS BY MOUTH EVERY DAY    UREA 20 % CREA    APPLY LIBERAL AMOUNT TOPICALLY TWICE A DAY AS NEEDED FOR DRY SKIN -- FOR FEET         Objective:   Objective   Physical Exam   Vitals:    01/08/25 1323   BP: 130/64   Pulse: 81   Temp: 98 °F (36.7 °C)   TempSrc: Oral   SpO2: 96%   Weight: 79.3 kg (174 lb 15 oz)   Height: 6' 2" (1.88 m)    Body mass index is 22.46 kg/m².            Physical Exam  Constitutional:       General: He is not in acute distress.     Appearance: He is well-developed.   Eyes:      Extraocular Movements: Extraocular movements intact.   Cardiovascular:      Rate and Rhythm: Normal rate and regular rhythm.      Heart sounds: Normal heart sounds. No murmur heard.  Pulmonary:      Effort: Pulmonary effort is normal.      Breath sounds: Normal breath sounds.   Musculoskeletal:         General: Normal range of motion.      Right lower leg: No edema.      Left lower leg: No edema.   Skin:     General: Skin is warm and dry.   Neurological:      Mental Status: He is alert and oriented to person, " place, and time.      Coordination: Coordination normal.   Psychiatric:         Behavior: Behavior normal.         Thought Content: Thought content normal.

## 2025-01-08 ENCOUNTER — TELEPHONE (OUTPATIENT)
Dept: FAMILY MEDICINE | Facility: CLINIC | Age: 70
End: 2025-01-08

## 2025-01-08 ENCOUNTER — OFFICE VISIT (OUTPATIENT)
Dept: FAMILY MEDICINE | Facility: CLINIC | Age: 70
End: 2025-01-08
Payer: MEDICARE

## 2025-01-08 VITALS
WEIGHT: 174.94 LBS | HEART RATE: 81 BPM | SYSTOLIC BLOOD PRESSURE: 130 MMHG | TEMPERATURE: 98 F | DIASTOLIC BLOOD PRESSURE: 64 MMHG | BODY MASS INDEX: 22.45 KG/M2 | HEIGHT: 74 IN | OXYGEN SATURATION: 96 %

## 2025-01-08 DIAGNOSIS — I25.10 CORONARY ARTERY DISEASE INVOLVING NATIVE CORONARY ARTERY OF NATIVE HEART WITHOUT ANGINA PECTORIS: ICD-10-CM

## 2025-01-08 DIAGNOSIS — M54.50 CHRONIC MIDLINE LOW BACK PAIN WITHOUT SCIATICA: Primary | ICD-10-CM

## 2025-01-08 DIAGNOSIS — F11.20 OPIOID DEPENDENCE, UNCOMPLICATED: ICD-10-CM

## 2025-01-08 DIAGNOSIS — M62.89 MUSCLE TIGHTNESS: ICD-10-CM

## 2025-01-08 DIAGNOSIS — R80.9 CONTROLLED TYPE 2 DIABETES MELLITUS WITH MICROALBUMINURIA, WITHOUT LONG-TERM CURRENT USE OF INSULIN: ICD-10-CM

## 2025-01-08 DIAGNOSIS — G89.29 CHRONIC MIDLINE LOW BACK PAIN WITHOUT SCIATICA: Primary | ICD-10-CM

## 2025-01-08 DIAGNOSIS — I48.91 NEW ONSET ATRIAL FIBRILLATION: ICD-10-CM

## 2025-01-08 DIAGNOSIS — F11.90 CHRONIC NARCOTIC USE: ICD-10-CM

## 2025-01-08 DIAGNOSIS — I42.9 CARDIOMYOPATHY, UNSPECIFIED TYPE: ICD-10-CM

## 2025-01-08 DIAGNOSIS — J44.9 CHRONIC OBSTRUCTIVE PULMONARY DISEASE, UNSPECIFIED COPD TYPE: ICD-10-CM

## 2025-01-08 DIAGNOSIS — I10 ESSENTIAL HYPERTENSION: ICD-10-CM

## 2025-01-08 DIAGNOSIS — E11.29 CONTROLLED TYPE 2 DIABETES MELLITUS WITH MICROALBUMINURIA, WITHOUT LONG-TERM CURRENT USE OF INSULIN: ICD-10-CM

## 2025-01-08 DIAGNOSIS — I50.22 CHRONIC SYSTOLIC CONGESTIVE HEART FAILURE: ICD-10-CM

## 2025-01-08 DIAGNOSIS — I85.00 ESOPHAGEAL VARICES WITHOUT BLEEDING, UNSPECIFIED ESOPHAGEAL VARICES TYPE: ICD-10-CM

## 2025-01-08 PROCEDURE — 99999 PR PBB SHADOW E&M-EST. PATIENT-LVL V: CPT | Mod: PBBFAC,HCNC,, | Performed by: INTERNAL MEDICINE

## 2025-01-08 RX ORDER — HYDROCODONE BITARTRATE AND ACETAMINOPHEN 10; 325 MG/1; MG/1
1 TABLET ORAL
Qty: 30 TABLET | Refills: 0 | Status: SHIPPED | OUTPATIENT
Start: 2025-03-07

## 2025-01-08 RX ORDER — HYDROCODONE BITARTRATE AND ACETAMINOPHEN 10; 325 MG/1; MG/1
1 TABLET ORAL
Qty: 30 TABLET | Refills: 0 | Status: SHIPPED | OUTPATIENT
Start: 2025-01-08

## 2025-01-08 RX ORDER — CYCLOBENZAPRINE HCL 10 MG
10 TABLET ORAL NIGHTLY
Qty: 30 TABLET | Refills: 2 | Status: SHIPPED | OUTPATIENT
Start: 2025-01-08

## 2025-01-08 RX ORDER — HYDROCODONE BITARTRATE AND ACETAMINOPHEN 10; 325 MG/1; MG/1
1 TABLET ORAL
Qty: 30 TABLET | Refills: 0 | Status: SHIPPED | OUTPATIENT
Start: 2025-02-07

## 2025-01-08 NOTE — TELEPHONE ENCOUNTER
----- Message from Jorge Alberto sent at 1/8/2025  2:51 PM CST -----  Regarding: self  Type: Patient Call Back    Who called:self    What is the request in detail:calling to speak to this office regarding his medication, would like a callback    Can the clinic reply by [facility]?callback    Would the patient rather a call back or a response via [facility]? callback    Best call back number:[phone]    Additional Information:

## 2025-01-08 NOTE — TELEPHONE ENCOUNTER
Spoke with patient and he informed me that the pharmacy said that a PA needs to be done for his pain medication Hydrocodone-acetaminophen  mg. A PA form request from the insurance company has been submitted.

## 2025-01-12 ENCOUNTER — PATIENT OUTREACH (OUTPATIENT)
Dept: ADMINISTRATIVE | Facility: HOSPITAL | Age: 70
End: 2025-01-12
Payer: MEDICARE

## 2025-01-13 DIAGNOSIS — Z00.00 ENCOUNTER FOR MEDICARE ANNUAL WELLNESS EXAM: ICD-10-CM

## 2025-01-17 ENCOUNTER — TELEPHONE (OUTPATIENT)
Dept: FAMILY MEDICINE | Facility: CLINIC | Age: 70
End: 2025-01-17
Payer: MEDICARE

## 2025-01-17 NOTE — TELEPHONE ENCOUNTER
----- Message from Skyhoodmagan sent at 1/17/2025  3:44 PM CST -----  Who called:beatriz    What is the request in detail:sent a fax over for a prior auth regarding HYDROcodone-acetaminophen (NORCO)  mg per tablet     Can the clinic reply by [facility]?no    Would the patient rather a call back or a response via [facility]?call    Best call back number:[phone]     Additional Information:

## 2025-01-17 NOTE — TELEPHONE ENCOUNTER
----- Message from [employer] Technologiesmagan sent at 1/17/2025  3:44 PM CST -----  Who called:beatriz    What is the request in detail:sent a fax over for a prior auth regarding HYDROcodone-acetaminophen (NORCO)  mg per tablet     Can the clinic reply by [facility]?no    Would the patient rather a call back or a response via [facility]?call    Best call back number:[phone]     Additional Information:

## 2025-01-17 NOTE — TELEPHONE ENCOUNTER
Called patient about concerns patient states he's waiting on a fax to be sent over for medication explained to patient that soon as the fax comes we will give them a call. Verbalized understanding

## 2025-02-21 ENCOUNTER — TELEPHONE (OUTPATIENT)
Dept: FAMILY MEDICINE | Facility: CLINIC | Age: 70
End: 2025-02-21
Payer: MEDICARE

## 2025-02-21 DIAGNOSIS — J31.0 NONALLERGIC RHINITIS: ICD-10-CM

## 2025-02-21 RX ORDER — AZELASTINE 1 MG/ML
1 SPRAY, METERED NASAL 2 TIMES DAILY
Qty: 30 ML | Refills: 11 | Status: SHIPPED | OUTPATIENT
Start: 2025-02-21 | End: 2026-02-21

## 2025-02-21 NOTE — TELEPHONE ENCOUNTER
----- Message from Yina sent at 2/21/2025  2:59 PM CST -----  Type: Patient Call BackWho called: self What is the request in detail: please call pt to get an script for sinus. Please callCan the clinic reply by [facility]? No Would the patient rather a call back or a response via [facility]?  [facility] call back number: .[phone] (home)  Additional Information:

## 2025-03-20 ENCOUNTER — TELEPHONE (OUTPATIENT)
Dept: FAMILY MEDICINE | Facility: CLINIC | Age: 70
End: 2025-03-20
Payer: MEDICARE

## 2025-03-20 NOTE — TELEPHONE ENCOUNTER
----- Message from Tiara sent at 3/20/2025 11:27 AM CDT -----  Regarding: TRIPP CISNEROS [[id number]]  Type:  Patient Returning Call  Who Called: Kim Pinzon   Who Left Message for Patient:Kim Pinzon   Does the patient know what this is regarding?:appointment rescheduling and availability changes. Would the patient rather a call back or a response via [facility]? Call back   Best Call Back Number:[phone]  Additional Information:

## 2025-03-24 LAB
ALBUMIN/GLOB SERPL: NORMAL {RATIO}
ALBUMIN: 3.8
ALP ISOS SERPL LEV INH-CCNC: 86 U/L
ALT SERPL-CCNC: 23 UNIT/L
ANION GAP SERPL CALC-SCNC: NORMAL MMOL/L
AST: 23
BILIRUB SERPL-MCNC: 0.5 MG/DL (ref 0.1–1.4)
BILIRUBIN DIRECT+TOT PNL SERPL-MCNC: NORMAL
BUN SERPL-MCNC: 18 MG/DL
BUN SERPL-MCNC: NORMAL MG/DL
BUN/CREAT SERPL: NORMAL
CALCIUM SERPL-MCNC: 9.9 MG/DL
CHLORIDE SERPL-SCNC: 105 MMOL/L (ref 99–108)
CHOL/HDLC RATIO: NORMAL
CHOLEST SERPL-MSCNC: 77 MG/DL (ref 0–200)
CK-BB: NORMAL
CO2 SERPL-SCNC: 27 MMOL/L
CO2 SERPL-SCNC: NORMAL MMOL/L
COMPLEXED PSA SERPL-MCNC: 0.83 NG/ML
CREAT SERPL-MCNC: 1 MG/DL
EGFR: 81
GLOBULIN SER-MCNC: NORMAL G/DL
GLUCOSE SERPL-MCNC: 93 MG/DL
HBA1C MFR BLD: 8.5 % (ref 4–6)
HDLC SERPL-MCNC: 36 MG/DL (ref 35–70)
IRON: NORMAL
LDL CHOLESTEROL DIRECT: 28 MG/DL
LDLC SERPL CALC-MCNC: 20 MG/DL (ref 0–160)
MAGNESIUM SERPL-MCNC: NORMAL MG/DL
NONHDLC SERPL-MCNC: NORMAL MG/DL
PHOSPHATE FLD-MCNC: NORMAL MG/DL
POTASSIUM SERPL-SCNC: 4.6 MMOL/L (ref 3.4–5.3)
PROTEIN TOTAL: 7.8
SODIUM BLD-SCNC: 140 MMOL/L (ref 137–147)
TRIGL SERPL-MCNC: 105 MG/DL (ref 40–160)
URATE SERPL-MCNC: NORMAL MG/DL
VLDL CHOLESTEROL: NORMAL

## 2025-04-15 NOTE — PROGRESS NOTES
This note was created by combination of typed  and M-Modal dictation.  Transcription errors may be present.   This note was also generated with the assistance of ambient listening technology. Verbal consent was obtained by the patient and accompanying visitor(s) for the recording of patient appointment to facilitate this note. I attest to having reviewed and edited the generated note for accuracy, though some syntax or spelling errors may persist. Please contact the author of this note for any clarification.    Assessment and Plan:   Assessment and Plan   Assessment & Plan  Chronic midline low back pain without sciatica  Chronic narcotic use  04/16/2025:  Stable on current regimen.  Finds the dose effective, denies side effects, keeps him functional.     queried, no aberrancy  Taking prescription narcotic as prescribed  Intensive monitoring of high risk medication.  Orders:    HYDROcodone-acetaminophen (NORCO)  mg per tablet; Take 1 tablet by mouth every 24 hours as needed for Pain.    HYDROcodone-acetaminophen (NORCO)  mg per tablet; Take 1 tablet by mouth every 24 hours as needed for Pain.    HYDROcodone-acetaminophen (NORCO)  mg per tablet; Take 1 tablet by mouth every 24 hours as needed for Pain.      Medications Discontinued During This Encounter   Medication Reason    HYDROcodone-acetaminophen (NORCO)  mg per tablet Reorder    HYDROcodone-acetaminophen (NORCO)  mg per tablet Reorder    HYDROcodone-acetaminophen (NORCO)  mg per tablet Reorder       meds sent this encounter:     Medications Ordered This Encounter   Medications    HYDROcodone-acetaminophen (NORCO)  mg per tablet     Sig: Take 1 tablet by mouth every 24 hours as needed for Pain.     Dispense:  30 tablet     Refill:  0     Quantity prescribed more than 7 day supply? Yes, quantity medically necessary     I have reviewed the Prescription Drug Monitoring Program (PDMP) database for this patient  prior to prescribing the above opioid medication:   Yes    HYDROcodone-acetaminophen (NORCO)  mg per tablet     Sig: Take 1 tablet by mouth every 24 hours as needed for Pain.     Dispense:  30 tablet     Refill:  0     Quantity prescribed more than 7 day supply? Yes, quantity medically necessary     I have reviewed the Prescription Drug Monitoring Program (PDMP) database for this patient prior to prescribing the above opioid medication:   Yes    HYDROcodone-acetaminophen (NORCO)  mg per tablet     Sig: Take 1 tablet by mouth every 24 hours as needed for Pain.     Dispense:  30 tablet     Refill:  0     Quantity prescribed more than 7 day supply? Yes, quantity medically necessary     I have reviewed the Prescription Drug Monitoring Program (PDMP) database for this patient prior to prescribing the above opioid medication:   Yes        Follow Up:  Follow up 3 months routine  Future Appointments   Date Time Provider Department Center   4/16/2025 10:40 AM Rogelio March MD [facility]          Subjective:   Subjective   Chief Complaint   Patient presents with    Follow-up       HPI  Paulino is a 69 y.o. male.     Social History     Socioeconomic History    Marital status: Single    Number of children: 5   Occupational History    Occupation: disabled - orthopedic   Tobacco Use    Smoking status: Every Day     Current packs/day: 0.25     Types: Cigarettes     Passive exposure: Current    Smokeless tobacco: Never    Tobacco comments:     Not ready to stop smoking vs    Substance and Sexual Activity    Alcohol use: Not Currently    Drug use: No    Sexual activity: Yes     Partners: Female     Social Drivers of Health     Financial Resource Strain: Low Risk  (11/4/2022)    Overall Financial Resource Strain (CARDIA)     Difficulty of Paying Living Expenses: Not hard at all   Food Insecurity: No Food Insecurity (11/4/2022)    Hunger Vital Sign     Worried About Running Out of Food in the Last Year: Never  true     Ran Out of Food in the Last Year: Never true   Transportation Needs: No Transportation Needs (11/4/2022)    PRAPARE - Transportation     Lack of Transportation (Medical): No     Lack of Transportation (Non-Medical): No   Stress: No Stress Concern Present (11/4/2022)    [employer] [place] [employer] - Occupational Stress Questionnaire     Feeling of Stress : Not at all   Housing Stability: Low Risk  (11/4/2022)    Housing Stability Vital Sign     Unable to Pay for Housing in the Last Year: No     Number of Places Lived in the Last Year: 1     Unstable Housing in the Last Year: No       Last appointment with this clinic was 1/8/2025. Last visit with me 1/8/2025   To summarize last visit and events leading up to today:  Chronic back pain, chronic narcotic dependence, stable   [facility]  COPD, smoker   Coronary artery disease  Hypertension  11/9/22 admit [facility] pna with sepsis, NIKOLAS, new AFib, new cardiomyopathy. On DOAC, BB, losartan. Reported[facility]. [facility] sp[facility]olactone.  And stopped his amiodarone  Last visit 1/2/24 off of BB. On ARB on spironolactone  Abdominal aortic ectasia [facility]     1/30/24 CT lung  Impression:  Lung-RADS Category: 4A - Suspicious - consultation advised - possible next steps 3 month LDCT -in some scenarios PET/CT. Three pulmonary nodules meeting criteria for follow-up CT in 3 months. Two of these nodules were identified on prior exam and were unchanged. The 3rd nodule in the left lung was obscured by adjacent consolidation on prior exam.  Clinically or potentially clinically significant non lung cancer finding: S - Significant. Mild aneurysmal dilatation of the ascending aorta measuring up to 4.3 cm and the descending aorta measuring up to 3.4 cm.  Severe calcific coronary artery atherosclerosis.      Last visit 4/8/24  Lung CT done at [facility] but managed by [facility]  AFib, managed by [facility]  CAD, [facility]  DM, [facility]     ED for  epistaxis     Saw provider at [facility] 7/17/24 for COPD?      [facility] labs done  CBC, CMP normal   Ferritin normal  Lipid normal   A1c 8.5   B12, folate normal   TSH normal  Vitamin-D normal     Results  Order Name Results Value Reference Range Date   B12/FOLATE PANEL COBALAMIN (VITAMIN B12) [MASS/VOLUME] IN SERUM OR PLASMA 795 pg/mL 180 - 914 03/24/2025   B12/FOLATE PANEL FOLATE [MASS/VOLUME] IN SERUM OR PLASMA >20ng/mL 5.9 03/24/2025   CBC WITH PLATELET COUNT LEUKOCYTES [#/VOLUME] IN BLOOD BY AUTOMATED COUNT 7.8 10*3/uL 4.8 - 10.8 03/24/2025   CBC WITH PLATELET COUNT ERYTHROCYTES [#/VOLUME] IN BLOOD BY AUTOMATED COUNT 4.13 10*6/uL 4.50 - 6.10 03/24/2025   CBC WITH PLATELET COUNT HEMOGLOBIN [MASS/VOLUME] IN BLOOD 12.4 g/dL 14.0 - 18.0 03/24/2025   CBC WITH PLATELET COUNT HEMATOCRIT [VOLUME FRACTION] OF BLOOD BY AUTOMATED COUNT 37.9 42.0 - 52.0 03/24/2025   CBC WITH PLATELET COUNT MCV [ENTITIC VOLUME] BY AUTOMATED COUNT 91.7 fL 81.0 - 98.0 03/24/2025   CBC WITH PLATELET COUNT MCH [ENTITIC MASS] BY AUTOMATED COUNT 30.0 pg 27.0 - 32.6 03/24/2025   CBC WITH PLATELET COUNT MCHC [MASS/VOLUME] BY AUTOMATED COUNT 32.7 g/dL 32.2 - 34.8 03/24/2025   CBC WITH PLATELET COUNT PLATELETS [#/VOLUME] IN BLOOD BY AUTOMATED COUNT 219 10*3/uL 140 - 420 03/24/2025   CBC WITH PLATELET COUNT PLATELET MEAN VOLUME [ENTITIC VOLUME] IN BLOOD BY AUTOMATED COUNT 8.2 fL 7.4 - 10.8 03/24/2025   CBC WITH PLATELET COUNT ERYTHROCYTE DISTRIBUTION WIDTH [RATIO] BY AUTOMATED COUNT 15.8 11.8 - 14.9 03/24/2025   COMPREHENSIVE METABOLIC PANEL CREATININE [MASS/VOLUME] IN SERUM OR PLASMA 1.0 mg/dL 0.6 - 1.3 03/24/2025   COMPREHENSIVE METABOLIC PANEL UREA NITROGEN [MASS/VOLUME] IN SERUM OR PLASMA 18 mg/dL 7 - 20 03/24/2025   COMPREHENSIVE METABOLIC PANEL GLUCOSE [MASS/VOLUME] IN SERUM OR PLASMA 93 mg/dL 70 - 110 03/24/2025   COMPREHENSIVE METABOLIC PANEL SODIUM [MOLES/VOLUME] IN SERUM OR PLASMA 140 meq/L 136 - 144 03/24/2025   COMPREHENSIVE METABOLIC PANEL POTASSIUM  [MOLES/VOLUME] IN SERUM OR PLASMA 4.6 meq/L 3.6 - 5.1 03/24/2025   COMPREHENSIVE METABOLIC PANEL CHLORIDE [MOLES/VOLUME] IN SERUM OR PLASMA 105 meq/L 101 - 111 03/24/2025   COMPREHENSIVE METABOLIC PANEL CARBON DIOXIDE, TOTAL [MOLES/VOLUME] IN SERUM OR PLASMA 27 meq/L 22 - 32 03/24/2025   COMPREHENSIVE METABOLIC PANEL CALCIUM [MASS/VOLUME] IN SERUM OR PLASMA 9.9 mg/dL 8.9 - 10.3 03/24/2025   COMPREHENSIVE METABOLIC PANEL PROTEIN [MASS/VOLUME] IN SERUM OR PLASMA 7.8 g/dL 6.7 - 8.5 03/24/2025   COMPREHENSIVE METABOLIC PANEL ALBUMIN [MASS/VOLUME] IN SERUM OR PLASMA 3.8 g/dL 3.5 - 5.0 03/24/2025   COMPREHENSIVE METABOLIC PANEL BILIRUBIN.TOTAL [MASS/VOLUME] IN SERUM OR PLASMA 0.5 mg/dL 0.1 - 1.3 03/24/2025   COMPREHENSIVE METABOLIC PANEL ALKALINE PHOSPHATASE [ENZYMATIC ACTIVITY/VOLUME] IN SERUM OR PLASMA 86 [IU]/L 38 - 126 03/24/2025   COMPREHENSIVE METABOLIC PANEL ASPARTATE AMINOTRANSFERASE [ENZYMATIC ACTIVITY/VOLUME] IN SERUM OR PLASMA 23 [IU]/L 15 - 41 03/24/2025   COMPREHENSIVE METABOLIC PANEL ALANINE AMINOTRANSFERASE [ENZYMATIC ACTIVITY/VOLUME] IN SERUM OR PLASMA 23 [IU]/L 12 - 63 03/24/2025   COMPREHENSIVE METABOLIC PANEL GLOMERULAR FILTRATION RATE/1.73 SQ M.PREDICTED [VOLUME RATE/AREA] IN SERUM, PLASMA OR BLOOD BY CREATININE-BASED FORMULA (CKD-EPI 2021) 81   03/24/2025   FERRITIN FERRITIN [MASS/VOLUME] IN SERUM OR PLASMA 56 ng/mL 24 - 336 03/24/2025   GLYCOLATED HEMOGLOBIN A1C HEMOGLOBIN A1C/HEMOGLOBIN.TOTAL IN BLOOD BY HPLC 8.5 4.2 - 5.8 03/24/2025   IRON PANEL IRON [MASS/VOLUME] IN SERUM OR PLASMA 57 ug/dL 50 - 170 03/24/2025   IRON PANEL TRANSFERRIN [MASS/VOLUME] IN SERUM OR PLASMA 248.5 mg/dL 180.0 - 329.0 03/24/2025   IRON PANEL IRON SATURATION [MASS FRACTION] IN SERUM OR PLASMA 18   03/24/2025   LIPID PROFILE TRIGLYCERIDE [MASS/VOLUME] IN SERUM OR PLASMA 105 mg/dL 0 - 200 03/24/2025   LIPID PROFILE CHOLESTEROL IN LDL [MASS/VOLUME] IN SERUM OR PLASMA BY CALCULATION 20.4 mg/dL 0 - 100 03/24/2025   LIPID PROFILE  CHOLESTEROL [MASS/VOLUME] IN SERUM OR PLASMA 77 mg/dL 0 - 240 03/24/2025   LIPID PROFILE CHOLESTEROL IN LDL [MASS/VOLUME] IN SERUM OR PLASMA BY DIRECT ASSAY 28 mg/dL   03/24/2025   LIPID PROFILE CHOLESTEROL IN HDL [MASS/VOLUME] IN SERUM OR PLASMA 35.6 mg/dL 40 03/24/2025   PROSTATIC SPECIFIC ANTIGEN PROSTATE SPECIFIC AG [MASS/VOLUME] IN SERUM OR PLASMA 0.83 ng/mL 0 - 4 03/24/2025   TSH THYROTROPIN [UNITS/VOLUME] IN SERUM OR PLASMA 0.91 m[IU]/mL 0.34 - 5.6 03/24/2025   VITAMIN D-25 HYDROXY 25-HYDROXYVITAMIN D3 [MASS/VOLUME] IN SERUM OR PLASMA 45.7 ng/mL 30 - 100 03/24/2025      3/24, 2/18, 1/18  Today's visit:    History of Present Illness    HPI:  Paulino is here for a routine follow-up visit regarding his pain medication. He reports taking the pain medication daily, which is effective in maintaining his functionality without causing side effects such as constipation, drowsiness, or confusion. He remains active, engaging in activities like hunting and fishing. He also uses a muscle relaxer approximately once a month for muscle cramps or tightening. [facility] where no changes were made to his medications. He is due to schedule follow-up visits with various specialists [facility], including eye, foot, primary care, heart, lung, and endocrinologist.    MEDICATIONS:  - Eliquis (blood thinner)  - Carvedilol (Coreg), 0.5 tablet twice daily, for heart  - Jardiance, for blood sugar and heart  - Spironolactone  - Spiriva inhaler, for breathing  - Albuterol inhaler, for rescue  - Metformin, for blood sugar  - Crestor, 0.5 tablet, for cholesterol  - Losartan, for blood pressure  - Allopurinol, to prevent gout  - Flexeril, as needed (approximately once a month) for muscle cramps or tightening  - hydrocodone, 1 tablet daily, effective without side effects of constipation, drowsiness, or confusion    ROS:  Gastrointestinal: no constipation  Musculoskeletal: reports muscle cramps          Patient Care Team:  Rogelio March MD as PCP - General (Internal Medicine)  Fermin Lee Jr., MD as Consulting Physician (Family Medicine)  [facility]  Priyanka Guerra MD (Internal Medicine)  Fito Bowles Sr., MD as Consulting Physician (Ophthalmology)  Rock Quijano MD (Cardiology)  Administration, [facility]    Patient Active Problem List    Diagnosis Date Noted    Pulmonary nodule 04/08/2024    Esophageal varices without bleeding, unspecified esophageal varices type 02/01/2023    Aortic atherosclerosis 02/01/2023 4/13/20 CT abd/pelvis There are calcifications in the abdominal aorta and its branch vessels      Combined forms of age-related cataract, left eye 02/01/2023    Erectile dysfunction 02/01/2023    Cardiomyopathy 11/04/2022 11/02/2022 TTE LV mildly enlarged with eccentric hypertrophy moderately decreased systolic function.  LVEF 35%.  Grade 2 diastolic dysfunction.  Segmental left ventricular wall motion abnormalities.  Normal RV size and RV systolic function.      New onset atrial fibrillation in the setting of left lower lobe pneumonia 11/02/2022    VT (ventricular tachycardia) 11/02/2022    Tobacco abuse counseling 10/30/2022    Common bile duct dilatation on CT and MRI, tumor markers negative, no further imaging 07/29/2021    Ventral hernia without obstruction or gangrene 06/08/2020    Anemia of chronic disease on labs 9/2019 09/11/2019    GERD (gastroesophageal reflux disease) 05/14/2019    COPD (chronic obstructive pulmonary disease) with reported hx of abn PFTs 05/14/2019 11/9/22 CT chest: Centrilobular emphysema.      Primary osteoarthritis of both knees hx of injection 05/14/2019    History of hepatitis C s/p SVR 2014 [facility].  [facility] with [facility], reported MRI liver 8/2018 08/29/2018    Idiopathic gout of multiple sites 11/24/2017    Chronic midline low back pain without sciatica; by report, 9/8/2011 MRI lumbar spine schmorl's nodes and  ligamentous hypertrophy, root impingment noted 10/24/2017    Chronic narcotic use 10/24/2017    Essential hypertension 10/24/2017    Controlled type 2 diabetes mellitus with microalbuminuria, without long-term current use of insulin 10/24/2017    Coronary artery disease involving native coronary artery of native heart without angina pectoris 10/24/2017    Benign prostatic hyperplasia without lower urinary tract symptoms 10/24/2017    Smoker 10/24/2017       PAST MEDICAL PROBLEMS, PAST SURGICAL HISTORY: please see relevant portions of the electronic medical record    ALLERGIES AND MEDICATIONS: updated and reviewed.  Medication List with Changes/Refills   Current Medications    ALBUTEROL (PROVENTIL/VENTOLIN HFA) 90 MCG/ACTUATION INHALER    INHALE 2 PUFFS BY MOUTH EVERY FOUR HOURS AS NEEDED AS A RESCUE INHALER    ALLOPURINOL (ZYLOPRIM) 100 MG TABLET    Take 1 tablet (100 mg total) by mouth once daily. Decreased dose    APIXABAN (ELIQUIS) 5 MG TAB    Take 5 mg by mouth 2 (two) times daily.    ASPIRIN (ECOTRIN) 81 MG EC TABLET    Take 81 mg by mouth once daily.    AZELASTINE (ASTELIN) 137 MCG (0.1 %) NASAL SPRAY    1 spray (137 mcg total) by Nasal route 2 (two) times daily.    CARVEDILOL (COREG) 25 MG TABLET    Take 12.5 mg by mouth 2 (two) times daily with meals.    CETIRIZINE (ZYRTEC) 10 MG TABLET    cetirizine 10 mg tablet   Take 1 tablet every day by oral route as needed for 30 days.    CYCLOBENZAPRINE (FLEXERIL) 10 MG TABLET    Take 1 tablet (10 mg total) by mouth every evening.    EMPAGLIFLOZIN (JARDIANCE) 25 MG TABLET    Take 12.5 mg by mouth once daily.    FAMOTIDINE (PEPCID) 20 MG TABLET    20 mg.    FLUTICASONE PROPIONATE (FLONASE) 50 MCG/ACTUATION NASAL SPRAY    1 spray by Each Nostril route once daily.    FLUTICASONE-SALMETEROL DISKUS INHALER 500-50 MCG    INHALE 1 PUFF BY MOUTH TWICE A DAY FOR COPD    HYDROCODONE-ACETAMINOPHEN (NORCO)  MG PER TABLET    Take 1 tablet by mouth every 24 hours as needed  "for Pain.    HYDROCODONE-ACETAMINOPHEN (NORCO)  MG PER TABLET    Take 1 tablet by mouth every 24 hours as needed for Pain.    HYDROCODONE-ACETAMINOPHEN (NORCO)  MG PER TABLET    Take 1 tablet by mouth every 24 hours as needed for Pain.    LANOLIN/MINERAL OIL (EUCERIN ORIGINAL TOP)    APPLY LIBERAL AMOUNT TOPICALLY TWICE A DAY AS NEEDED FOR DRY SKIN -- FOR LEGS AND FEET    LIDOCAINE (LIDODERM) 5 %    APPLY 3 PATCHES TOPICALLY EVERY DAY FOR PAIN. WEAR FOR 12 HOURS, THEN REMOVE. DO NOT APPLY NEW PATCH FOR AT LEAST 12 HOURS.    LOSARTAN (COZAAR) 100 MG TABLET    Take 1 tablet by mouth once daily.    METFORMIN (FORTAMET) 500 MG 24HR TABLET    Take 1,000 mg by mouth 2 (two) times daily with meals.    METHYL SALICYLATE-MENTHOL 15-10% 15-10 % CREA    APPLY MODERATE AMOUNT TOPICALLY ONCE DAILY AS NEEDED    NARCAN 4 MG/ACTUATION SPRY        ROSUVASTATIN (CRESTOR) 40 MG TAB    Take 20 mg by mouth every evening.    SILDENAFIL (VIAGRA) 50 MG TABLET    TAKE ONE-HALF TABLET BY MOUTH EVERY SEVEN DAYS AS NEEDED 30 TO 60 MINUTES BEFORE INTERCOURSE FOR BEST RESULTS TAKE ON EMPTY STOMACH    SPIRONOLACTONE (ALDACTONE) 25 MG TABLET    Take 12.5 mg by mouth once daily.    TIOTROPIUM BROMIDE (SPIRIVA RESPIMAT) 1.25 MCG/ACTUATION INHALER    INHALE 2 PUFFS BY MOUTH EVERY DAY    UREA 20 % CREA    APPLY LIBERAL AMOUNT TOPICALLY TWICE A DAY AS NEEDED FOR DRY SKIN -- FOR FEET         Objective:   Objective   Physical Exam   Vitals:    04/16/25 1022   BP: 126/68   Pulse: 75   Temp: 98.5 °F (36.9 °C)   TempSrc: Oral   SpO2: 95%   Weight: 78.4 kg (172 lb 13.5 oz)   Height: 6' 2" (1.88 m)    Body mass index is 22.19 kg/m².  Weight: 78.4 kg (172 lb 13.5 oz)   Height: 6' 2" (188 cm)     Physical Exam  Constitutional:       General: He is not in acute distress.     Appearance: He is well-developed.   Eyes:      Extraocular Movements: Extraocular movements intact.   Cardiovascular:      Rate and Rhythm: Normal rate and regular rhythm.      " Heart sounds: Normal heart sounds. No murmur heard.  Pulmonary:      Effort: Pulmonary effort is normal.      Breath sounds: Normal breath sounds.   Musculoskeletal:         General: Normal range of motion.      Right lower leg: No edema.      Left lower leg: No edema.   Skin:     General: Skin is warm and dry.   Neurological:      Mental Status: He is alert and oriented to person, place, and time.      Coordination: Coordination normal.   Psychiatric:         Behavior: Behavior normal.         Thought Content: Thought content normal.

## 2025-04-16 ENCOUNTER — OFFICE VISIT (OUTPATIENT)
Dept: FAMILY MEDICINE | Facility: CLINIC | Age: 70
End: 2025-04-16
Payer: MEDICARE

## 2025-04-16 VITALS
HEIGHT: 74 IN | HEART RATE: 75 BPM | BODY MASS INDEX: 22.18 KG/M2 | WEIGHT: 172.81 LBS | OXYGEN SATURATION: 95 % | TEMPERATURE: 99 F | DIASTOLIC BLOOD PRESSURE: 68 MMHG | SYSTOLIC BLOOD PRESSURE: 126 MMHG

## 2025-04-16 DIAGNOSIS — F11.90 CHRONIC NARCOTIC USE: ICD-10-CM

## 2025-04-16 DIAGNOSIS — M54.50 CHRONIC MIDLINE LOW BACK PAIN WITHOUT SCIATICA: ICD-10-CM

## 2025-04-16 DIAGNOSIS — G89.29 CHRONIC MIDLINE LOW BACK PAIN WITHOUT SCIATICA: ICD-10-CM

## 2025-04-16 PROCEDURE — 99999 PR PBB SHADOW E&M-EST. PATIENT-LVL V: CPT | Mod: PBBFAC,HCNC,, | Performed by: INTERNAL MEDICINE

## 2025-04-16 RX ORDER — HYDROCODONE BITARTRATE AND ACETAMINOPHEN 10; 325 MG/1; MG/1
1 TABLET ORAL
Qty: 30 TABLET | Refills: 0 | Status: SHIPPED | OUTPATIENT
Start: 2025-06-22

## 2025-04-16 RX ORDER — HYDROCODONE BITARTRATE AND ACETAMINOPHEN 10; 325 MG/1; MG/1
1 TABLET ORAL
Qty: 30 TABLET | Refills: 0 | Status: SHIPPED | OUTPATIENT
Start: 2025-05-23

## 2025-04-16 RX ORDER — HYDROCODONE BITARTRATE AND ACETAMINOPHEN 10; 325 MG/1; MG/1
1 TABLET ORAL
Qty: 30 TABLET | Refills: 0 | Status: SHIPPED | OUTPATIENT
Start: 2025-04-23

## 2025-04-16 NOTE — ASSESSMENT & PLAN NOTE
04/16/2025:  Stable on current regimen.  Finds the dose effective, denies side effects, keeps him functional.     queried, no aberrancy  Taking prescription narcotic as prescribed  Intensive monitoring of high risk medication.  Orders:    HYDROcodone-acetaminophen (NORCO)  mg per tablet; Take 1 tablet by mouth every 24 hours as needed for Pain.    HYDROcodone-acetaminophen (NORCO)  mg per tablet; Take 1 tablet by mouth every 24 hours as needed for Pain.    HYDROcodone-acetaminophen (NORCO)  mg per tablet; Take 1 tablet by mouth every 24 hours as needed for Pain.

## 2025-05-21 ENCOUNTER — PATIENT OUTREACH (OUTPATIENT)
Dept: ADMINISTRATIVE | Facility: HOSPITAL | Age: 70
End: 2025-05-21
Payer: MEDICARE

## 2025-07-09 ENCOUNTER — PATIENT OUTREACH (OUTPATIENT)
Dept: ADMINISTRATIVE | Facility: HOSPITAL | Age: 70
End: 2025-07-09
Payer: MEDICARE

## 2025-07-09 DIAGNOSIS — E11.9 TYPE 2 DIABETES MELLITUS WITHOUT COMPLICATION, WITHOUT LONG-TERM CURRENT USE OF INSULIN: Primary | ICD-10-CM

## 2025-07-18 ENCOUNTER — TELEPHONE (OUTPATIENT)
Dept: FAMILY MEDICINE | Facility: CLINIC | Age: 70
End: 2025-07-18
Payer: MEDICARE

## 2025-07-18 NOTE — TELEPHONE ENCOUNTER
Copied from CRM #[id number]. Topic: Appointments - Appointment Access  >> Jul 18, 2025 12:26 PM Med Assistant Carolann wrote:  Type:  Sooner Apoointment Request    Caller is requesting a sooner appointment.  Caller declined first available appointment listed below.  Caller will not accept being placed on the waitlist and is requesting a message be sent to doctor.  Name of Caller:pt   When is the first available appointment?10/22  Symptoms:3 month f/u   Would the patient rather a call back or a response via [facility]? Callback   Best Call Back Number:Telephone Information:  Mobile          [phone]     Additional Information:

## 2025-07-18 NOTE — TELEPHONE ENCOUNTER
Left voice message for patient to return call to clinic to reschedule appointment 10/2025 if he wanted to be seen sooner It can be with  a np liliane arevalo due to provider not having opening

## 2025-07-22 ENCOUNTER — TELEPHONE (OUTPATIENT)
Dept: FAMILY MEDICINE | Facility: CLINIC | Age: 70
End: 2025-07-22
Payer: MEDICARE

## 2025-07-22 DIAGNOSIS — F11.90 CHRONIC NARCOTIC USE: ICD-10-CM

## 2025-07-22 DIAGNOSIS — M54.50 CHRONIC MIDLINE LOW BACK PAIN WITHOUT SCIATICA: ICD-10-CM

## 2025-07-22 DIAGNOSIS — G89.29 CHRONIC MIDLINE LOW BACK PAIN WITHOUT SCIATICA: ICD-10-CM

## 2025-07-22 RX ORDER — HYDROCODONE BITARTRATE AND ACETAMINOPHEN 10; 325 MG/1; MG/1
1 TABLET ORAL
Qty: 30 TABLET | Refills: 0 | Status: SHIPPED | OUTPATIENT
Start: 2025-07-22

## 2025-07-22 NOTE — TELEPHONE ENCOUNTER
Can we have him come in sooner?  Will send in RF x 1; have him set up OV 1 month for follow up.  Ok to override

## 2025-07-22 NOTE — TELEPHONE ENCOUNTER
Copied from CRM #[id number]. Topic: General Inquiry - Return Call  >> Jul 22, 2025  3:01 PM Ashley wrote:  .Type:  Patient Returning Call    Who Called: self     Who Left Message for Patient:  Akira Zapata MA       Does the patient know what this is regarding?: yes     Would the patient rather a call back or a response via [facility]?  Call back     Best Call Back Number: [phone]      Additional Information:  pt wants to speak to nurse

## 2025-07-22 NOTE — TELEPHONE ENCOUNTER
Copied from CRM #[id number]. Topic: Medications - Medication Refill  >> Jul 18, 2025 12:27 PM Med Assistant Carolann wrote:  Type:  RX Refill Request    Who Called: Pt   Refill or New Rx:refill   RX Name and Strength:HYDROcodone-acetaminophen (NORCO)  mg per tablet  How is the patient currently taking it? (ex. 1XDay):Sig - Route: Take 1 tablet by mouth every 24 hours as needed for Pain. - Oral  Is this a 30 day or 90 day RX:30  Preferred Pharmacy with phone number:  [facility] - [address]  [address] [place] [address]  Phone: [phone] Fax: [phone]     Local or Mail Order:Local   Ordering Provider:Rogelio March MD  Would the patient rather a call back or a response via [facility]? Callback   Best Call Back Number:Telephone Information:  Mobile          [phone]     Additional Information: Asking for a refill until 10/22 apt can be moved up , didn't realize he missed 7/16 ov and lab due to he had a dentist apt same day . [facility]  >> Jul 18, 2025  1:54 PM Med Assistant Kathy wrote:    ----- Message -----  From: Carolann Laguna MA  Sent: 7/18/2025  12:30 PM CDT  To: Anca Mercado Staff

## 2025-08-25 ENCOUNTER — TELEPHONE (OUTPATIENT)
Dept: FAMILY MEDICINE | Facility: CLINIC | Age: 70
End: 2025-08-25
Payer: MEDICARE

## 2025-08-25 DIAGNOSIS — G89.29 CHRONIC MIDLINE LOW BACK PAIN WITHOUT SCIATICA: ICD-10-CM

## 2025-08-25 DIAGNOSIS — M54.50 CHRONIC MIDLINE LOW BACK PAIN WITHOUT SCIATICA: ICD-10-CM

## 2025-08-25 DIAGNOSIS — F11.90 CHRONIC NARCOTIC USE: ICD-10-CM

## 2025-08-25 RX ORDER — HYDROCODONE BITARTRATE AND ACETAMINOPHEN 10; 325 MG/1; MG/1
1 TABLET ORAL
Qty: 30 TABLET | Refills: 0 | Status: SHIPPED | OUTPATIENT
Start: 2025-08-25

## 2025-08-26 ENCOUNTER — TELEPHONE (OUTPATIENT)
Dept: FAMILY MEDICINE | Facility: CLINIC | Age: 70
End: 2025-08-26
Payer: MEDICARE

## 2025-09-04 ENCOUNTER — TELEPHONE (OUTPATIENT)
Dept: FAMILY MEDICINE | Facility: CLINIC | Age: 70
End: 2025-09-04
Payer: MEDICARE

## 2025-09-05 ENCOUNTER — OFFICE VISIT (OUTPATIENT)
Dept: FAMILY MEDICINE | Facility: CLINIC | Age: 70
End: 2025-09-05
Payer: MEDICARE

## 2025-09-05 VITALS
DIASTOLIC BLOOD PRESSURE: 60 MMHG | BODY MASS INDEX: 21.58 KG/M2 | HEART RATE: 71 BPM | OXYGEN SATURATION: 95 % | WEIGHT: 168.19 LBS | TEMPERATURE: 98 F | HEIGHT: 74 IN | SYSTOLIC BLOOD PRESSURE: 132 MMHG

## 2025-09-05 DIAGNOSIS — G89.29 CHRONIC MIDLINE LOW BACK PAIN WITHOUT SCIATICA: ICD-10-CM

## 2025-09-05 DIAGNOSIS — M54.50 CHRONIC MIDLINE LOW BACK PAIN WITHOUT SCIATICA: ICD-10-CM

## 2025-09-05 DIAGNOSIS — F11.90 CHRONIC NARCOTIC USE: ICD-10-CM

## 2025-09-05 PROCEDURE — 99999 PR PBB SHADOW E&M-EST. PATIENT-LVL V: CPT | Mod: PBBFAC,HCNC,, | Performed by: INTERNAL MEDICINE

## 2025-09-05 RX ORDER — HYDROCODONE BITARTRATE AND ACETAMINOPHEN 10; 325 MG/1; MG/1
1 TABLET ORAL
Qty: 30 TABLET | Refills: 0 | Status: SHIPPED | OUTPATIENT
Start: 2025-11-24

## 2025-09-05 RX ORDER — HYDROCODONE BITARTRATE AND ACETAMINOPHEN 10; 325 MG/1; MG/1
1 TABLET ORAL
Qty: 30 TABLET | Refills: 0 | Status: SHIPPED | OUTPATIENT
Start: 2025-10-25

## 2025-09-05 RX ORDER — HYDROCODONE BITARTRATE AND ACETAMINOPHEN 10; 325 MG/1; MG/1
1 TABLET ORAL
Qty: 30 TABLET | Refills: 0 | Status: SHIPPED | OUTPATIENT
Start: 2025-09-25